# Patient Record
Sex: MALE | Race: WHITE | NOT HISPANIC OR LATINO | Employment: OTHER | URBAN - METROPOLITAN AREA
[De-identification: names, ages, dates, MRNs, and addresses within clinical notes are randomized per-mention and may not be internally consistent; named-entity substitution may affect disease eponyms.]

---

## 2020-05-08 ENCOUNTER — OFFICE VISIT (OUTPATIENT)
Dept: VASCULAR SURGERY | Facility: CLINIC | Age: 80
End: 2020-05-08
Payer: MEDICARE

## 2020-05-08 VITALS
TEMPERATURE: 97.4 F | HEIGHT: 64 IN | SYSTOLIC BLOOD PRESSURE: 130 MMHG | WEIGHT: 148 LBS | DIASTOLIC BLOOD PRESSURE: 76 MMHG | HEART RATE: 82 BPM | BODY MASS INDEX: 25.27 KG/M2

## 2020-05-08 DIAGNOSIS — I73.9 RIGHT LEG CLAUDICATION (HCC): Primary | ICD-10-CM

## 2020-05-08 DIAGNOSIS — I73.9 PAD (PERIPHERAL ARTERY DISEASE) (HCC): ICD-10-CM

## 2020-05-08 DIAGNOSIS — I65.23 ASYMPTOMATIC BILATERAL CAROTID ARTERY STENOSIS: ICD-10-CM

## 2020-05-08 PROBLEM — Z95.1 HX OF CABG: Status: ACTIVE | Noted: 2020-05-08

## 2020-05-08 PROBLEM — F32.A DEPRESSION: Status: ACTIVE | Noted: 2020-05-08

## 2020-05-08 PROCEDURE — 99204 OFFICE O/P NEW MOD 45 MIN: CPT | Performed by: NURSE PRACTITIONER

## 2020-05-08 RX ORDER — ASPIRIN 81 MG/1
325 TABLET ORAL DAILY
COMMUNITY
End: 2021-05-05 | Stop reason: ALTCHOICE

## 2020-05-11 ENCOUNTER — HOSPITAL ENCOUNTER (OUTPATIENT)
Dept: RADIOLOGY | Facility: HOSPITAL | Age: 80
Discharge: HOME | End: 2020-05-11
Payer: MEDICARE

## 2020-05-11 ENCOUNTER — TRANSCRIBE ORDERS (OUTPATIENT)
Dept: ADMINISTRATIVE | Facility: HOSPITAL | Age: 80
End: 2020-05-11

## 2020-05-11 DIAGNOSIS — I73.9 RIGHT LEG CLAUDICATION (HCC): ICD-10-CM

## 2020-05-11 DIAGNOSIS — I65.23 ASYMPTOMATIC BILATERAL CAROTID ARTERY STENOSIS: ICD-10-CM

## 2020-05-11 PROCEDURE — 93925 LOWER EXTREMITY STUDY: CPT | Performed by: SURGERY

## 2020-05-11 PROCEDURE — 93923 UPR/LXTR ART STDY 3+ LVLS: CPT

## 2020-05-11 PROCEDURE — 93925 LOWER EXTREMITY STUDY: CPT

## 2020-05-11 PROCEDURE — 93880 EXTRACRANIAL BILAT STUDY: CPT

## 2020-05-11 PROCEDURE — 93922 UPR/L XTREMITY ART 2 LEVELS: CPT | Performed by: SURGERY

## 2020-05-11 PROCEDURE — 93880 EXTRACRANIAL BILAT STUDY: CPT | Performed by: SURGERY

## 2020-05-13 ENCOUNTER — OFFICE VISIT (OUTPATIENT)
Dept: VASCULAR SURGERY | Facility: CLINIC | Age: 80
End: 2020-05-13
Payer: MEDICARE

## 2020-05-13 ENCOUNTER — TELEPHONE (OUTPATIENT)
Dept: VASCULAR SURGERY | Facility: CLINIC | Age: 80
End: 2020-05-13

## 2020-05-13 VITALS
BODY MASS INDEX: 25.1 KG/M2 | HEART RATE: 76 BPM | DIASTOLIC BLOOD PRESSURE: 80 MMHG | WEIGHT: 147 LBS | TEMPERATURE: 97.7 F | SYSTOLIC BLOOD PRESSURE: 142 MMHG | HEIGHT: 64 IN

## 2020-05-13 DIAGNOSIS — I73.9 PAD (PERIPHERAL ARTERY DISEASE) (HCC): ICD-10-CM

## 2020-05-13 DIAGNOSIS — I65.23 ASYMPTOMATIC BILATERAL CAROTID ARTERY STENOSIS: ICD-10-CM

## 2020-05-13 DIAGNOSIS — I73.9 RIGHT LEG CLAUDICATION (HCC): Primary | ICD-10-CM

## 2020-05-13 PROCEDURE — 99214 OFFICE O/P EST MOD 30 MIN: CPT | Performed by: SURGERY

## 2020-05-13 RX ORDER — UBIDECARENONE 50 MG
CAPSULE ORAL DAILY
COMMUNITY
End: 2021-06-18 | Stop reason: HOSPADM

## 2020-05-28 ENCOUNTER — TELEPHONE (OUTPATIENT)
Dept: VASCULAR SURGERY | Facility: CLINIC | Age: 80
End: 2020-05-28

## 2020-05-29 ENCOUNTER — PREP FOR PROCEDURE (OUTPATIENT)
Dept: VASCULAR SURGERY | Facility: CLINIC | Age: 80
End: 2020-05-29

## 2020-05-29 DIAGNOSIS — Z11.59 SCREENING FOR VIRAL DISEASE: Primary | ICD-10-CM

## 2020-05-29 DIAGNOSIS — I73.9 CLAUDICATION (HCC): Primary | ICD-10-CM

## 2020-06-18 PROCEDURE — 1124F ACP DISCUSS-NO DSCNMKR DOCD: CPT | Performed by: SURGERY

## 2020-06-19 ENCOUNTER — APPOINTMENT (OUTPATIENT)
Dept: LAB | Facility: CLINIC | Age: 80
End: 2020-06-19
Payer: MEDICARE

## 2020-06-19 DIAGNOSIS — I73.9 RIGHT LEG CLAUDICATION (HCC): ICD-10-CM

## 2020-06-19 LAB
ANION GAP SERPL CALCULATED.3IONS-SCNC: 4 MMOL/L (ref 4–13)
BUN SERPL-MCNC: 20 MG/DL (ref 5–25)
CALCIUM SERPL-MCNC: 8.9 MG/DL (ref 8.3–10.1)
CHLORIDE SERPL-SCNC: 107 MMOL/L (ref 100–108)
CO2 SERPL-SCNC: 30 MMOL/L (ref 21–32)
CREAT SERPL-MCNC: 0.79 MG/DL (ref 0.6–1.3)
ERYTHROCYTE [DISTWIDTH] IN BLOOD BY AUTOMATED COUNT: 14 % (ref 11.6–15.1)
GFR SERPL CREATININE-BSD FRML MDRD: 85 ML/MIN/1.73SQ M
GLUCOSE P FAST SERPL-MCNC: 103 MG/DL (ref 65–99)
HCT VFR BLD AUTO: 46.3 % (ref 36.5–49.3)
HGB BLD-MCNC: 14.8 G/DL (ref 12–17)
INR PPP: 0.96 (ref 0.84–1.19)
MCH RBC QN AUTO: 30.1 PG (ref 26.8–34.3)
MCHC RBC AUTO-ENTMCNC: 32 G/DL (ref 31.4–37.4)
MCV RBC AUTO: 94 FL (ref 82–98)
PLATELET # BLD AUTO: 176 THOUSANDS/UL (ref 149–390)
PMV BLD AUTO: 10.8 FL (ref 8.9–12.7)
POTASSIUM SERPL-SCNC: 4.2 MMOL/L (ref 3.5–5.3)
PROTHROMBIN TIME: 12.4 SECONDS (ref 11.6–14.5)
RBC # BLD AUTO: 4.91 MILLION/UL (ref 3.88–5.62)
SODIUM SERPL-SCNC: 141 MMOL/L (ref 136–145)
WBC # BLD AUTO: 4.17 THOUSAND/UL (ref 4.31–10.16)

## 2020-06-19 PROCEDURE — 36415 COLL VENOUS BLD VENIPUNCTURE: CPT

## 2020-06-19 PROCEDURE — 85610 PROTHROMBIN TIME: CPT

## 2020-06-19 PROCEDURE — 80048 BASIC METABOLIC PNL TOTAL CA: CPT

## 2020-06-19 PROCEDURE — 85027 COMPLETE CBC AUTOMATED: CPT

## 2020-06-25 ENCOUNTER — DOCUMENTATION (OUTPATIENT)
Dept: URGENT CARE | Facility: CLINIC | Age: 80
End: 2020-06-25

## 2020-06-25 DIAGNOSIS — Z11.59 SCREENING FOR VIRAL DISEASE: ICD-10-CM

## 2020-06-25 PROCEDURE — U0003 INFECTIOUS AGENT DETECTION BY NUCLEIC ACID (DNA OR RNA); SEVERE ACUTE RESPIRATORY SYNDROME CORONAVIRUS 2 (SARS-COV-2) (CORONAVIRUS DISEASE [COVID-19]), AMPLIFIED PROBE TECHNIQUE, MAKING USE OF HIGH THROUGHPUT TECHNOLOGIES AS DESCRIBED BY CMS-2020-01-R: HCPCS

## 2020-06-26 ENCOUNTER — ANESTHESIA EVENT (OUTPATIENT)
Dept: PERIOP | Facility: HOSPITAL | Age: 80
End: 2020-06-26
Payer: MEDICARE

## 2020-06-26 NOTE — PRE-PROCEDURE INSTRUCTIONS
Have you had / have a sore throat? no  have you had / have a cough less than 1 week? no  Have you had / have a fever greater than 100 0 - 100  4? no  Are you experiencing any shortness of breath? no    Pre-Surgery Instructions:   Medication Instructions    Apoaequorin (PREVAGEN PO) Instructed patient per Anesthesia Guidelines   aspirin (ECOTRIN LOW STRENGTH) 81 mg EC tablet Patient was instructed by Physician and understands  continue per MD note    FOLBIC 2 5-25-2 MG Instructed patient per Anesthesia Guidelines   niacin (NIASPAN) 1000 MG CR tablet Instructed patient per Anesthesia Guidelines   omega-3-acid ethyl esters (LOVAZA) 1 g capsule Instructed patient per Anesthesia Guidelines   PARoxetine (PAXIL) 10 mg tablet Instructed patient per Anesthesia Guidelines   Red Yeast Rice 600 MG TABS Instructed patient per Anesthesia Guidelines  Antidepressant Med Class     Continue to take this medication on your normal schedule  If this is an oral medication and you take it in the morning, then you may take this medicine with a sip of water  ASA Med Class: Aspirin     Should be discontinued at least one week prior to planned operation, unless specifically stated otherwise by surgical service  Your Surgeon may have patient stop taking aspirin up to a week before surgery if having intracranial, middle ear, posterior eye, spine surgery or prostate surgery  [Patients taking aspirin for coronary stents should be reviewed by an anesthesiologist in the optimization clinic  Please do not discontinue aspirin in patients with coronary stents unless given specific permission to do so by the cardiologist who prescribed medication ]   If your surgeon approves please continue to take this medication on your normal schedule  You may take this medication on the morning of your surgery with a sip of water      Herbal Med Class     Stop taking this herbal medications at least one week prior to surgery/procedure  NonStatin Med Class     Continue to take this medication on your normal schedule  If this is an oral medication and you take it in the morning, then you may take this medicine with a sip of water    Pre procedure instructions reviewed verbalizes understanding

## 2020-06-27 LAB — SARS-COV-2 RNA SPEC QL NAA+PROBE: NOT DETECTED

## 2020-07-02 ENCOUNTER — APPOINTMENT (OUTPATIENT)
Dept: RADIOLOGY | Facility: HOSPITAL | Age: 80
End: 2020-07-02
Attending: SURGERY
Payer: MEDICARE

## 2020-07-02 ENCOUNTER — ANESTHESIA (OUTPATIENT)
Dept: PERIOP | Facility: HOSPITAL | Age: 80
End: 2020-07-02
Payer: MEDICARE

## 2020-07-02 ENCOUNTER — HOSPITAL ENCOUNTER (OUTPATIENT)
Facility: HOSPITAL | Age: 80
Setting detail: OUTPATIENT SURGERY
Discharge: HOME | End: 2020-07-02
Attending: SURGERY | Admitting: SURGERY
Payer: MEDICARE

## 2020-07-02 VITALS
HEART RATE: 68 BPM | OXYGEN SATURATION: 97 % | RESPIRATION RATE: 18 BRPM | SYSTOLIC BLOOD PRESSURE: 138 MMHG | TEMPERATURE: 98 F | HEIGHT: 64 IN | BODY MASS INDEX: 25.1 KG/M2 | WEIGHT: 147 LBS | DIASTOLIC BLOOD PRESSURE: 72 MMHG

## 2020-07-02 DIAGNOSIS — I73.9 RIGHT LEG CLAUDICATION (HCC): Primary | ICD-10-CM

## 2020-07-02 DIAGNOSIS — I73.9 PERIPHERAL VASCULAR DISEASE, UNSPECIFIED (HCC): ICD-10-CM

## 2020-07-02 LAB
ATRIAL RATE: 0 BPM
ATRIAL RATE: 79 BPM
P AXIS: 30 DEGREES
PR INTERVAL: 158 MS
QRS AXIS: -12 DEGREES
QRS AXIS: 0 DEGREES
QRSD INTERVAL: 0 MS
QRSD INTERVAL: 128 MS
QT INTERVAL: 0 MS
QT INTERVAL: 430 MS
QTC INTERVAL: 0 MS
QTC INTERVAL: 493 MS
T WAVE AXIS: 0 DEGREES
T WAVE AXIS: 33 DEGREES
VENTRICULAR RATE: 0 BPM
VENTRICULAR RATE: 79 BPM

## 2020-07-02 PROCEDURE — C1876 STENT, NON-COA/NON-COV W/DEL: HCPCS | Performed by: SURGERY

## 2020-07-02 PROCEDURE — C1725 CATH, TRANSLUMIN NON-LASER: HCPCS | Performed by: SURGERY

## 2020-07-02 PROCEDURE — 76937 US GUIDE VASCULAR ACCESS: CPT | Performed by: SURGERY

## 2020-07-02 PROCEDURE — C1887 CATHETER, GUIDING: HCPCS | Performed by: SURGERY

## 2020-07-02 PROCEDURE — NC001 PR NO CHARGE: Performed by: SURGERY

## 2020-07-02 PROCEDURE — C1894 INTRO/SHEATH, NON-LASER: HCPCS | Performed by: SURGERY

## 2020-07-02 PROCEDURE — 93005 ELECTROCARDIOGRAM TRACING: CPT

## 2020-07-02 PROCEDURE — 93010 ELECTROCARDIOGRAM REPORT: CPT | Performed by: INTERNAL MEDICINE

## 2020-07-02 PROCEDURE — C1769 GUIDE WIRE: HCPCS | Performed by: SURGERY

## 2020-07-02 PROCEDURE — 76937 US GUIDE VASCULAR ACCESS: CPT

## 2020-07-02 PROCEDURE — 37226 PR REVASCULARIZE FEM/POP ARTERY,ANGIOPLASTY/STENT: CPT | Performed by: SURGERY

## 2020-07-02 PROCEDURE — 75625 CONTRAST EXAM ABDOMINL AORTA: CPT | Performed by: SURGERY

## 2020-07-02 PROCEDURE — 75710 ARTERY X-RAYS ARM/LEG: CPT

## 2020-07-02 PROCEDURE — C1760 CLOSURE DEV, VASC: HCPCS | Performed by: SURGERY

## 2020-07-02 PROCEDURE — 99024 POSTOP FOLLOW-UP VISIT: CPT | Performed by: SURGERY

## 2020-07-02 PROCEDURE — G9198 NO ORDER FOR CEPH NO REASON: HCPCS | Performed by: SURGERY

## 2020-07-02 PROCEDURE — 75710 ARTERY X-RAYS ARM/LEG: CPT | Performed by: SURGERY

## 2020-07-02 PROCEDURE — 75625 CONTRAST EXAM ABDOMINL AORTA: CPT

## 2020-07-02 DEVICE — SELF-EXPANDING STENT SYSTEM
Type: IMPLANTABLE DEVICE | Site: ARTERIAL | Status: FUNCTIONAL
Brand: INNOVA™ VASCULAR

## 2020-07-02 DEVICE — CLOSURE DEVICE MYNX ACE 6F/7FR: Type: IMPLANTABLE DEVICE | Site: ARTERIAL | Status: FUNCTIONAL

## 2020-07-02 RX ORDER — LIDOCAINE HYDROCHLORIDE 10 MG/ML
0.5 INJECTION, SOLUTION EPIDURAL; INFILTRATION; INTRACAUDAL; PERINEURAL ONCE AS NEEDED
Status: DISCONTINUED | OUTPATIENT
Start: 2020-07-02 | End: 2020-07-02 | Stop reason: HOSPADM

## 2020-07-02 RX ORDER — FENTANYL CITRATE/PF 50 MCG/ML
12.5 SYRINGE (ML) INJECTION
Status: DISCONTINUED | OUTPATIENT
Start: 2020-07-02 | End: 2020-07-02 | Stop reason: HOSPADM

## 2020-07-02 RX ORDER — SODIUM CHLORIDE, SODIUM LACTATE, POTASSIUM CHLORIDE, CALCIUM CHLORIDE 600; 310; 30; 20 MG/100ML; MG/100ML; MG/100ML; MG/100ML
50 INJECTION, SOLUTION INTRAVENOUS CONTINUOUS
Status: DISCONTINUED | OUTPATIENT
Start: 2020-07-02 | End: 2020-07-02 | Stop reason: HOSPADM

## 2020-07-02 RX ORDER — HYDROMORPHONE HCL/PF 1 MG/ML
0.5 SYRINGE (ML) INJECTION
Status: DISCONTINUED | OUTPATIENT
Start: 2020-07-02 | End: 2020-07-02 | Stop reason: HOSPADM

## 2020-07-02 RX ORDER — PROPOFOL 10 MG/ML
INJECTION, EMULSION INTRAVENOUS CONTINUOUS PRN
Status: DISCONTINUED | OUTPATIENT
Start: 2020-07-02 | End: 2020-07-02 | Stop reason: SURG

## 2020-07-02 RX ORDER — HEPARIN SODIUM 1000 [USP'U]/ML
INJECTION, SOLUTION INTRAVENOUS; SUBCUTANEOUS AS NEEDED
Status: DISCONTINUED | OUTPATIENT
Start: 2020-07-02 | End: 2020-07-02 | Stop reason: SURG

## 2020-07-02 RX ORDER — ONDANSETRON 2 MG/ML
4 INJECTION INTRAMUSCULAR; INTRAVENOUS ONCE AS NEEDED
Status: DISCONTINUED | OUTPATIENT
Start: 2020-07-02 | End: 2020-07-02 | Stop reason: HOSPADM

## 2020-07-02 RX ORDER — CLOPIDOGREL BISULFATE 75 MG/1
75 TABLET ORAL DAILY
Qty: 90 TABLET | Refills: 0 | Status: SHIPPED | OUTPATIENT
Start: 2020-07-02 | End: 2020-11-18 | Stop reason: ALTCHOICE

## 2020-07-02 RX ORDER — SODIUM CHLORIDE 9 MG/ML
INJECTION, SOLUTION INTRAVENOUS CONTINUOUS PRN
Status: DISCONTINUED | OUTPATIENT
Start: 2020-07-02 | End: 2020-07-02 | Stop reason: SURG

## 2020-07-02 RX ORDER — OXYCODONE HYDROCHLORIDE 5 MG/1
10 TABLET ORAL EVERY 4 HOURS PRN
Status: DISCONTINUED | OUTPATIENT
Start: 2020-07-02 | End: 2020-07-02 | Stop reason: HOSPADM

## 2020-07-02 RX ORDER — LIDOCAINE HYDROCHLORIDE 10 MG/ML
INJECTION, SOLUTION EPIDURAL; INFILTRATION; INTRACAUDAL; PERINEURAL AS NEEDED
Status: DISCONTINUED | OUTPATIENT
Start: 2020-07-02 | End: 2020-07-02 | Stop reason: HOSPADM

## 2020-07-02 RX ORDER — OXYCODONE HYDROCHLORIDE 5 MG/1
5 TABLET ORAL EVERY 4 HOURS PRN
Status: DISCONTINUED | OUTPATIENT
Start: 2020-07-02 | End: 2020-07-02 | Stop reason: HOSPADM

## 2020-07-02 RX ORDER — PROTAMINE SULFATE 10 MG/ML
INJECTION, SOLUTION INTRAVENOUS AS NEEDED
Status: DISCONTINUED | OUTPATIENT
Start: 2020-07-02 | End: 2020-07-02 | Stop reason: SURG

## 2020-07-02 RX ORDER — HEPARIN SODIUM 200 [USP'U]/100ML
INJECTION, SOLUTION INTRAVENOUS
Status: COMPLETED | OUTPATIENT
Start: 2020-07-02 | End: 2020-07-02

## 2020-07-02 RX ORDER — CLOPIDOGREL BISULFATE 75 MG/1
150 TABLET ORAL DAILY
Status: DISCONTINUED | OUTPATIENT
Start: 2020-07-02 | End: 2020-07-02 | Stop reason: HOSPADM

## 2020-07-02 RX ADMIN — PROPOFOL 20 MCG/KG/MIN: 10 INJECTION, EMULSION INTRAVENOUS at 10:47

## 2020-07-02 RX ADMIN — HEPARIN SODIUM 3000 UNITS: 1000 INJECTION INTRAVENOUS; SUBCUTANEOUS at 11:17

## 2020-07-02 RX ADMIN — DEXMEDETOMIDINE 0.4 MCG/KG/HR: 100 INJECTION, SOLUTION, CONCENTRATE INTRAVENOUS at 10:47

## 2020-07-02 RX ADMIN — HEPARIN SODIUM 3000 UNITS: 1000 INJECTION INTRAVENOUS; SUBCUTANEOUS at 11:28

## 2020-07-02 RX ADMIN — CLOPIDOGREL BISULFATE 150 MG: 75 TABLET ORAL at 13:16

## 2020-07-02 RX ADMIN — PROTAMINE SULFATE 15 MG: 10 INJECTION, SOLUTION INTRAVENOUS at 12:15

## 2020-07-02 RX ADMIN — SODIUM CHLORIDE, SODIUM LACTATE, POTASSIUM CHLORIDE, AND CALCIUM CHLORIDE 50 ML/HR: .6; .31; .03; .02 INJECTION, SOLUTION INTRAVENOUS at 10:01

## 2020-07-02 RX ADMIN — SODIUM CHLORIDE: 0.9 INJECTION, SOLUTION INTRAVENOUS at 10:47

## 2020-07-02 NOTE — ANESTHESIA POSTPROCEDURE EVALUATION
Post-Op Assessment Note    CV Status:  Stable  Pain Score: 0    Pain management: adequate     Mental Status:  Alert and awake   Hydration Status:  Euvolemic   PONV Controlled:  Controlled   Airway Patency:  Patent   Post Op Vitals Reviewed: Yes      Staff: CRNA, Anesthesiologist           /82 (07/02/20 1233)    Temp (!) 96 5 °F (35 8 °C) (07/02/20 1233)    Pulse 70 (07/02/20 1233)   Resp 20 (07/02/20 1233)    SpO2 100 % (07/02/20 1233)

## 2020-07-02 NOTE — DISCHARGE INSTRUCTIONS
DISCHARGE INSTRUCTIONS  ARTERIOGRAM/ANGIOPLASTY/STENT    Following discharge from the hospital, you may have some questions about your procedure, your activities or your general condition  These instructions may answer some of your questions and help you adjust during the first few weeks following your operation  ACTIVITY: The evening following the procedure you should be sure someone remains with you until the next morning  Rest as much as possible, sitting, lying or reclining  Use the stairs as little as possible  The following day, limit your activity to walking  Avoid stooping or heavy lifting (no more than 30 lbs) for the first three days  Walking up steps and normal activities may be resumed as you feel ready  You should not drive a car for at least two days following discharge from the hospital  You may ride in a car  If you have any questions regarding a particular activity, please discuss with your doctor or nurse before you are discharged  DIET:  Resume your normal diet  Try to eat low fat and low cholesterol foods  Drink more liquids than usual for the next 24 hours  INCISION: Your doctor may have chosen to use a type of adhesive glue, to close your incision  The glue is used to cover the incision, assist in closure, and prevent contamination  This adhesive will darken and peel away on its own within one to two weeks  You may shower after the procedure, but do not scrub the incision  Sitting in a tub is not recommended for the two days following the procedure or if you have any open wounds  It is normal to have some bruising, swelling or mild discoloration around the incision  IF increasing redness or pain develops, call our office immediately  If present, you may remove the band-aid or steri-strips over your wound after two days  If you notice any active bleeding at the site, apply pressure to the site and call our office ([phone])      FOLLOW UP STUDIES:  Your doctor will discuss whether further treatments or follow-up studies are necessary at your first post procedure visit  PLEASE CALL THE OFFICE IF YOU HAVE ANY QUESTIONS  [phone] Natalya Hartley [facility] FREE [phone]  [address] [place] [address] , [address], [facility], [address]  [address], [address], [facility], [address] [facility]  [address], [facility], [address]  [address] [facility], [facility],[address], [facility], [address]    [address], [address]th [address], [address]i [address]  [address] [facility], [facility] 97   [address] [facility], [address] [facility], [facility] [place], [address] [facility]  [address] [facility], [address] [facility], Stacey Whitlock 6

## 2020-07-02 NOTE — ANESTHESIA PREPROCEDURE EVALUATION
Review of Systems/Medical History  Patient summary reviewed  Chart reviewed  No history of anesthetic complications     Cardiovascular  Exercise tolerance (METS): >4,  Hyperlipidemia, History of CABG (1995), Cardiac stents > 1 year PVD (H/o R SFA stent 2011),    Pulmonary  Smoker (Remote smoking hx) ex-smoker  ,        GI/Hepatic  Negative GI/hepatic ROS          Negative  ROS        Endo/Other  Negative endo/other ROS      GYN       Hematology  Negative hematology ROS      Musculoskeletal  Negative musculoskeletal ROS        Neurology   Psychology   Depression ,            Lab Results   Component Value Date    WBC 4 17 (L) 06/19/2020    HGB 14 8 06/19/2020    HCT 46 3 06/19/2020    MCV 94 06/19/2020     06/19/2020     Lab Results   Component Value Date    K 4 2 06/19/2020    CO2 30 06/19/2020     06/19/2020    BUN 20 06/19/2020    CREATININE 0 79 06/19/2020     Lab Results   Component Value Date    INR 0 96 06/19/2020    PROTIME 12 4 06/19/2020       Physical Exam    Airway    Mallampati score: II  TM Distance: >3 FB  Neck ROM: full     Dental       Cardiovascular      Pulmonary      Other Findings        Anesthesia Plan  ASA Score- 3     Anesthesia Type- IV sedation with anesthesia with ASA Monitors  Additional Monitors:   Airway Plan:         Plan Factors-    Induction- intravenous  Postoperative Plan-     Informed Consent- Anesthetic plan and risks discussed with patient  I personally reviewed this patient with the CRNA  Discussed and agreed on the Anesthesia Plan with the FATUMA Mayberry

## 2020-07-02 NOTE — H&P
Right leg claudication [facility])  Prior history of right popliteal artery stent done by Dr Huong Orosco at [facility]  Stent has high-grade stenosis  Also has stenosis of the common femoral artery as seen on the recent Dopplers  JIA is well preserved at 0 8 due to robust collaterals and chronicity of the process  Due to [place] pandemic patient would like to wait on any intervention  We had a discussion of the risks benefits and alternatives of endovascular intervention versus continued medical management  Due to the severe lifestyle limitation he would like to consider endovascular intervention  As he has no rest pain or ischemic tissue loss it is reasonable to wait as per his request and consider this after the [place] pandemic situation has improved  We had a discussion of the risks and benefits of the procedure such as access site complications from bleeding, distal embolization, stent reocclusion                       Subjective:       Patient ID: Elijah Stevens is a 79 y o  male      Patient presents today for right leg angiogram with intervention      The following portions of the patient's history were reviewed and updated as appropriate: allergies, current medications, past family history, past medical history, past social history, past surgical history and problem list      Review of Systems   Constitutional: Negative  HENT: Positive for hearing loss  Eyes: Negative  Respiratory: Negative  Cardiovascular: Negative  Gastrointestinal: Negative  Endocrine: Negative  Genitourinary: Negative  Musculoskeletal: Positive for gait problem  Leg pain  Leg cramping with walking   Skin: Negative  Allergic/Immunologic: Negative  Hematological: Negative  Psychiatric/Behavioral: Positive for confusion       I have reviewed the review of systems as entered and made appropriate changes as necessary     Objective:        /84   Pulse 79   Temp (!) 95 4 °F (35 2 °C) (Tympanic) Resp 18   Ht 5' 4" (1 626 m)   Wt 66 7 kg (147 lb)   SpO2 99%   BMI 25 23 kg/m²            Physical Exam   Constitutional: He is oriented to person, place, and time  He appears well-developed and well-nourished  HENT:   Head: Normocephalic and atraumatic  Right Ear: External ear normal    Left Ear: External ear normal    Cardiovascular: Normal rate  normal heart sounds  Left foot has triphasic DP and PT signals  Right foot has biphasic DP and PT signals  Pulmonary/Chest: Effort normal  Clear to auscultation bilateral   Abdominal: Soft  Neurological: He is alert and oriented to person, place, and time  Skin: Skin is warm and dry  Capillary refill takes less than 2 seconds  No rash noted  No erythema  No pallor  Psychiatric: He has a normal mood and affect  His behavior is normal    Nursing note and vitals reviewed

## 2020-07-02 NOTE — OP NOTE
OPERATIVE REPORT  PATIENT NAME: Miquel Harris    :  1940  MRN: [id number]  Pt Location: [facility] HYBRID OR ROOM 02    SURGERY DATE: 2020    Surgeon(s) and Role:     * Candy Pena MD - Primary     * Leno Hadley MD - Assisting    Preop Diagnosis:  Right leg claudication ([address] ) [I73 9]    Post-Op Diagnosis Codes:     * Right leg claudication ([address] ) [I73 9]    Procedure(s) (LRB):  US guided access of the left common femoral artery  Aortogram with pelvic runoff  Right leg angiogram   Right SFA and pop angioplasty and stent    Specimen(s):  * No specimens in log *    Estimated Blood Loss:   100 mL    Drains:  * No LDAs found *    Anesthesia Type:   Choice    Operative Indications:  Right leg claudication ([address] ) [I73 9]  Prior h/o right leg SFA stent done by Dr Charisma Sadler at [facility] 10 years ago    Operative Findings:  Angiogram findings and radiographic interpretation of test -   1  Abdominal aortogram findings -   Abdominal aorta - patent  Celiac axis - patent  Left renal artery - patent  Right renal artery - patent  Superior mesenteric artery - patent  This is a limited view aortogram    2  Pelvic runoff findings -   Left common iliac artery-patent  Left internal iliac artery-patent  Left external iliac artery-patent    Right common iliac artery-patent  Right internal iliac artery-patent  Right external iliac artery-patent     Right LE -   Common femoral heavily calcified with popcorn calcifications with about 50% stenosis  Profunda is widely patent with robust collaterals reconsituting the distal SFA  SFA is patent including prior stent in the distal SFA  Separate stent in the above knee popliteal is also patent  Inbetween the stents is a 15 cm portion  There are two separate near occlusive calcified stenosis spots  The rest of the popliteal artery is also patent  There is a two vessel runoff with patent TPT, PT and peroneal   AT occludes shortly after its takeoff    DP reconstitutes from peroneal and PT  Pedal arch is complete  Post intervention there is complete resolution of occlusive stenosis in the right distal SFA and popliteal artery  Complications:   None    Procedure and Technique:  IOPERATIVE PROCEDURE:  After patient identification and informed consent, the patient was taken to the Hybrid room in the interventional radiology suite and placed in a supine position  Adequate sedation was administered via IV route  The patients bilateral groins were cleaned and draped in sterile surgical fashion using Chlorhexidine  1% local Lidocaine was injected into the skin and subcutaneous tissue overlying the right femoral pulsation  Under ultrasound guidance a micro access needle was used to access the left common femoral artery  The common femoral artery was patent  There were posterior calcifications  After obtaining pulsatile flow, a micro guidewire was inserted through the needle and the access site was enlarged with a #11 scalpel  The needle was removed and a 4 Western Madonna micro access sheath was inserted over the guidewire using Seldinger technique  The micro wire was removed and a Bentson wire was advanced under fluoroscopic guidance through the micro sheath and parked in the proximal abdominal aorta under fluoroscopic guidance  The micro sheath was removed and a then a  5 Nepali sheath was inserted again using Seldinger technique over the wire  An Omni Flush catheter was next advanced over the guidewire and fluoroscopic guidance was used to park the catheter in the proximal abdominal aorta  Patient was then given 6000 units of IV heparin  Aortogram was performed  Using a Bentson and Omni flush catheter we went up and over and selected the right external iliac artery  right lower extremity runoff was performed  Over storq wire a 6Fr x 65 cm sheath was placed in the right distal SFA    Then using roadmap guide, 0 018 glide advantage and Navicross was used to cross lesions in true lumen   True lumen entry was confirmed with aspiration and angiography  Then we did predilatation with 4x100 Mercedes for 180s  Then we placed a [id number] stent across the area due to heavily calcified plaque  Stent was post dilated with a 4x100 and 6x100 balloon with excellent expansion  Completion angiogram was excellent  We then successfully deployed the Mynx as per 's instructions to achieve hemostasis at the puncture site  Patient was then given 15 mg of protamine to reverse the effect of heparin  At the end of the case patient's bilateral feet were well perfused and had good cap refill  There is now a new 2+ right PT pulse  On left there is unchanged 2+ DP pulse          Contrast Type/Amount -  81 CC VISIPAQUE 320    Fluoro Time (Mandatory) -15 3 MIN    Devices - MYNX , mercedes 4x100,  6x100 and Innova 6x100 stent    PLAN:    Start plavix   4 hrs bed rest      I was present for the entire procedure    Patient Disposition:  PACU     SIGNATURE: Braydon Ceja MD  DATE: July 2, 2020  TIME: 12:19 PM

## 2020-07-02 NOTE — NURSING NOTE
Family requested that patient script  Be transferred to another pharmacy spoke with new pharmacy deonna Rajan) will call [facility] in [place] to have script transferred  (plavix)

## 2020-07-14 ENCOUNTER — TELEPHONE (OUTPATIENT)
Dept: VASCULAR SURGERY | Facility: CLINIC | Age: 80
End: 2020-07-14

## 2020-07-14 NOTE — TELEPHONE ENCOUNTER

## 2020-07-15 ENCOUNTER — OFFICE VISIT (OUTPATIENT)
Dept: VASCULAR SURGERY | Facility: CLINIC | Age: 80
End: 2020-07-15
Payer: MEDICARE

## 2020-07-15 VITALS
HEIGHT: 64 IN | SYSTOLIC BLOOD PRESSURE: 136 MMHG | BODY MASS INDEX: 25.1 KG/M2 | WEIGHT: 147 LBS | DIASTOLIC BLOOD PRESSURE: 80 MMHG | TEMPERATURE: 97 F | HEART RATE: 92 BPM

## 2020-07-15 DIAGNOSIS — I73.9 RIGHT LEG CLAUDICATION (HCC): ICD-10-CM

## 2020-07-15 DIAGNOSIS — I73.9 PAD (PERIPHERAL ARTERY DISEASE) (HCC): Primary | ICD-10-CM

## 2020-07-15 PROCEDURE — 99213 OFFICE O/P EST LOW 20 MIN: CPT | Performed by: SURGERY

## 2020-07-15 NOTE — PROGRESS NOTES
Assessment/Plan:    Right leg claudication (HCC)  Recently underwent right lower extremity angiogram with balloon angioplasty and stenting of the right SFA with excellent results  Claudication has totally resolved and is back to routine activity  Follow-up arterial Doppler in 3 months to assess for patency of intervention  Continue Plavix  Diagnoses and all orders for this visit:    PAD (peripheral artery disease) (formerly [facility])  -     VAS lower limb arterial duplex, limited/unilateral; Future    Right leg claudication (formerly [facility])          Subjective:      Patient ID: Mina Dunn is a 79 y o  male  Patient is s/p RLE agram w/ intervention done 7/2 and presents today for f/u visit  HPI  Patient recently underwent right lower extremity angiogram with balloon angioplasty and stenting done on July 2nd  This was done because he was having pain when walking short distances in the right calf with significant cramping  Since the procedure this is totally resolved  He can walk for unlimited distance without any issues  He even get go uphill and down healed without any issues  He has some bruising in the left groin but that has now nearly resolved  It is tracking down a little bit on the thigh but it is now off a light brown color  The following portions of the patient's history were reviewed and updated as appropriate: allergies, current medications, past family history, past medical history, past social history, past surgical history and problem list     Review of Systems   Constitutional: Negative  HENT: Negative  Eyes: Negative  Respiratory: Negative  Cardiovascular: Negative  Gastrointestinal: Negative  Endocrine: Negative  Genitourinary: Negative  Musculoskeletal: Negative  Skin: Negative  Allergic/Immunologic: Negative  Neurological: Negative  Hematological: Negative  Psychiatric/Behavioral: Negative        I have reviewed the review of systems as entered and made appropriate changes as necessary    Objective:      /80 (BP Location: Right arm, Patient Position: Sitting)   Pulse 92   Temp (!) 97 °F (36 1 °C) (Tympanic)   Ht 5' 4" (1 626 m)   Wt 66 7 kg (147 lb)   BMI 25 23 kg/m²          Physical Exam   Constitutional: He is oriented to person, place, and time  He appears well-developed and well-nourished  HENT:   Head: Normocephalic and atraumatic  Right Ear: External ear normal    Left Ear: External ear normal    Eyes: Conjunctivae are normal  Right eye exhibits no discharge  Left eye exhibits no discharge  Cardiovascular: Normal rate and regular rhythm  Exam reveals no gallop and no friction rub  No murmur heard  Palpable 2+ right pulse  Anterior tibial pulse   Pulmonary/Chest: Effort normal and breath sounds normal  No stridor  No respiratory distress  He has no wheezes  Abdominal: Soft  Musculoskeletal: Normal range of motion  He exhibits no edema, tenderness or deformity  Neurological: He is alert and oriented to person, place, and time  Skin: Skin is warm and dry  Capillary refill takes less than 2 seconds  No rash noted  No erythema  No pallor  Psychiatric: He has a normal mood and affect  His behavior is normal    Nursing note and vitals reviewed

## 2020-07-15 NOTE — ASSESSMENT & PLAN NOTE
Recently underwent right lower extremity angiogram with balloon angioplasty and stenting of the right SFA with excellent results  Claudication has totally resolved and is back to routine activity  Follow-up arterial Doppler in 3 months to assess for patency of intervention  Continue Plavix

## 2020-10-15 ENCOUNTER — HOSPITAL ENCOUNTER (OUTPATIENT)
Dept: NON INVASIVE DIAGNOSTICS | Facility: CLINIC | Age: 80
Discharge: HOME | End: 2020-10-15
Payer: MEDICARE

## 2020-10-15 DIAGNOSIS — I73.9 PAD (PERIPHERAL ARTERY DISEASE) (HCC): ICD-10-CM

## 2020-10-15 PROCEDURE — 93923 UPR/LXTR ART STDY 3+ LVLS: CPT

## 2020-10-15 PROCEDURE — 93926 LOWER EXTREMITY STUDY: CPT

## 2020-10-16 PROCEDURE — 93922 UPR/L XTREMITY ART 2 LEVELS: CPT | Performed by: SURGERY

## 2020-10-16 PROCEDURE — 93926 LOWER EXTREMITY STUDY: CPT | Performed by: SURGERY

## 2020-11-17 ENCOUNTER — TELEPHONE (OUTPATIENT)
Dept: VASCULAR SURGERY | Facility: CLINIC | Age: 80
End: 2020-11-17

## 2020-11-18 ENCOUNTER — OFFICE VISIT (OUTPATIENT)
Dept: VASCULAR SURGERY | Facility: CLINIC | Age: 80
End: 2020-11-18
Payer: MEDICARE

## 2020-11-18 VITALS
BODY MASS INDEX: 25.27 KG/M2 | WEIGHT: 148 LBS | SYSTOLIC BLOOD PRESSURE: 148 MMHG | DIASTOLIC BLOOD PRESSURE: 82 MMHG | RESPIRATION RATE: 18 BRPM | TEMPERATURE: 97.7 F | HEIGHT: 64 IN | HEART RATE: 76 BPM

## 2020-11-18 DIAGNOSIS — I65.23 ASYMPTOMATIC BILATERAL CAROTID ARTERY STENOSIS: ICD-10-CM

## 2020-11-18 DIAGNOSIS — I73.9 PAD (PERIPHERAL ARTERY DISEASE) (HCC): ICD-10-CM

## 2020-11-18 DIAGNOSIS — I73.9 RIGHT LEG CLAUDICATION (HCC): Primary | ICD-10-CM

## 2020-11-18 PROCEDURE — 99212 OFFICE O/P EST SF 10 MIN: CPT | Performed by: SURGERY

## 2021-01-08 ENCOUNTER — TELEPHONE (OUTPATIENT)
Dept: GERIATRICS | Age: 81
End: 2021-01-08

## 2021-01-08 NOTE — TELEPHONE ENCOUNTER
[place] [address]  [phone]  Telephone Intake: Geriatric Assessment     Referral source: PCP- Dr Stevie Johnson                           Patient's PCP: Dr Stevie Johnson          Who called to schedule the appointment? Tonny Raymond (daughter)      (relationship to patient): same as above   's phone number: [phone]              Is there a POA in place/If so, who has POA? Yes   Others residing with patient: alone     Reason for referral: Patient concerns  regarding memory concerns  Patient has two properties one in [place] and one in [place], family is concerned because he is always driving back and forth between both properties  What is the goal of visit? family would like patient to be tested to determine if he has dementia      Has the patient been seen by a Neurologist? No  Has the patient ever been formally tested for memory/dementia? No  Has the patient ever been diagnosed with dementia? No      Preferred language? english   Can patient read English? Yes   Highest education level? Highest Level of Education: Associate's Degree   Does the patient wear glasses? No  Does the patient use hearing aids? No     First Visit: 2/5/21 at   Conference Visit: 2/26/21 at [address] [place] [address]  In office visit and family conference virtual/hybrid visit options explained? No    Caller was informed: Please make sure the patient is accompanied by someone who knows them well / a caregiver / family member to participate in this appointment  If a patient plans to attend the assessment alone, please route a message to the assigned provider  Office packet mailed out?  Yes

## 2021-01-26 ENCOUNTER — TELEPHONE (OUTPATIENT)
Dept: GERIATRICS | Age: 81
End: 2021-01-26

## 2021-02-04 ENCOUNTER — TELEPHONE (OUTPATIENT)
Dept: GERIATRICS | Age: 81
End: 2021-02-04

## 2021-02-04 NOTE — TELEPHONE ENCOUNTER
Left voicemail message for Terrie Black to contact Kathy Mendez  Will provide available dates/times for multiple geriatricians and attempt to schedule a COVID vaccine appointment

## 2021-02-04 NOTE — TELEPHONE ENCOUNTER
LCSW spoke with Mathew De Oliveira regarding tomorrow's appointment, noting that our provider will not be able to be present in the office but will plan to participate virtually  I will complete screenings as usual  Mathew De Oliveira noted that she would prefer to see a provider in person  Mathew De Oliveira also noted having spoken with someone in our office yesterday Diane Pride, ) regarding arranging her MyChart access to schedule her father's vaccine  She requests that if possible, he be called by phone to schedule rather than using MyChart  Mathew De Oliveira would like to know  1) When will Dr Jackelyn Chavez be returning to the office for in-person appointments? 2) What availability do other providers have to see her father for an assessment? She would not like to reschedule the appointment until she has more information about rescheduling options  Mathew De Oliveira can be reached at her work number today, [phone], rather than her cell phone

## 2021-02-05 ENCOUNTER — OFFICE VISIT (OUTPATIENT)
Dept: GERIATRICS | Age: 81
End: 2021-02-05
Payer: MEDICARE

## 2021-02-05 VITALS
DIASTOLIC BLOOD PRESSURE: 72 MMHG | TEMPERATURE: 98.1 F | WEIGHT: 152.6 LBS | BODY MASS INDEX: 26.05 KG/M2 | OXYGEN SATURATION: 98 % | SYSTOLIC BLOOD PRESSURE: 162 MMHG | HEART RATE: 75 BPM | HEIGHT: 64 IN | RESPIRATION RATE: 18 BRPM

## 2021-02-05 DIAGNOSIS — I73.9 PAD (PERIPHERAL ARTERY DISEASE) (HCC): ICD-10-CM

## 2021-02-05 DIAGNOSIS — Z95.1 HX OF CABG: ICD-10-CM

## 2021-02-05 DIAGNOSIS — F03.90 DEMENTIA WITHOUT BEHAVIORAL DISTURBANCE, UNSPECIFIED DEMENTIA TYPE (HCC): Primary | ICD-10-CM

## 2021-02-05 DIAGNOSIS — R03.0 ELEVATED BLOOD PRESSURE READING: ICD-10-CM

## 2021-02-05 DIAGNOSIS — F33.0 MILD EPISODE OF RECURRENT MAJOR DEPRESSIVE DISORDER (HCC): ICD-10-CM

## 2021-02-05 DIAGNOSIS — R89.9 ABNORMAL LABORATORY TEST: ICD-10-CM

## 2021-02-05 DIAGNOSIS — R79.9 ABNORMAL FINDING OF BLOOD CHEMISTRY, UNSPECIFIED: ICD-10-CM

## 2021-02-05 DIAGNOSIS — K59.09 OTHER CONSTIPATION: ICD-10-CM

## 2021-02-05 PROCEDURE — 99205 OFFICE O/P NEW HI 60 MIN: CPT | Performed by: STUDENT IN AN ORGANIZED HEALTH CARE EDUCATION/TRAINING PROGRAM

## 2021-02-05 NOTE — PROGRESS NOTES
Assessment & Plan:   There are no diagnoses linked to this encounter  No problem-specific Assessment & Plan notes found for this encounter  HPI:  We had the pleasure of evaluating Dianne Goldmann who is a 79 y o  male   in Geriatric consultation today  He lives with ***  Mr Kai Cooper is in the office with his ***    Patient seen with Dr Anel Nguyen and Bruce Bullard  MSW    Memory Issues noticed since {0 - 10:25131} {Time; day/wk/mo/yr(s):9074}    Memory affected:      {ED  CD_MEMORY:55199}    Symptoms started: {DESC; JRQLP:79648}  Over time the memory has:  {progression:7576887655}  Memory issue(s) were noted by: {Patient/Family/Caregiver:418866}   Patient has difficulties with {memory prob:52997}    He has problems operating household appliance such as TV remote, kitchen appliances, computer       He has difficulty finding the right word while speaking: {Yes or No:74411}  Patient requires repeat information or ask the same question repeatedly: {Yes or No:19152}  Do you drive: {Yes or ANDREW:77452}       Have you had any recent accidents, citations or getting lost in familiar places :{Yes or No:34706}  Do you handle your own financial affairs such as balancing your checkbook, paying bills, investments: {Yes or No:56452}  Do have any difficulties with handling your financial affairs: {Yes or No:37166}  Have you or your family noted any change in your mood or personality:{Yes or No:11472}  Are you currently or have you been treated in the past for depression or anxiety: {Yes or No:39746}  Have you noticed any gait or balance disorder: {Yes or No:21684}  Uses :{SL IP Geriatric Assistive Devices:78343}  Any hallucination or delusion: {Yes or No:15025}  Fluctuation in alertness: {Yes or No:83140}  Sleep Issues: {Yes or No:61362}  Urinary/Stool Incontinence: {Yes or No:22067}  Hearing and vision issue: {Yes or No:06984}  Do you have POA:{Yes or No:00426}  Do you have a Living will {Yes or No:18530}  Past Medical, surgical, social, medication and allergy history and patients previous records reviewed  Family Review of Behavior [facility]:    pacing  {Yes or No:15074}    agressive/combative behavior  {Yes or No:89452}    agitated  {Yes or No:01576}   wandering  {Yes or No:97334}   resistance to care  {Yes or No:33449}   hoarding/hiding objects  {Yes or No:58627}    suspicious  {Yes or No:60739}  withdrawn {Yes or No:40634}{Yes or No:58228}  inappropriate sexual behavior{Yes or No:33333}  rummaging/pillaging  {Yes or No:82582}    misplacing/losing objects {Yes or No:99036}  personal hygiene problems  {Yes or No:70563}  forgetfulness of actions {Yes or No:99241}   temper outbursts  {Yes or No:83258}     throwing items {Yes or No:09934}      Family member with dementia and what type? {YES/NO:29480}  Have you had any head trauma {Yes or No:61712}  Does patient have history of alcohol abuse {Yes or No:18778}      ROS: Review of Systems    Allergies:   No Known Allergies    Medications:      Current Outpatient Medications:     Apoaequorin (PREVAGEN PO), Take by mouth, Disp: , Rfl:     aspirin (ECOTRIN LOW STRENGTH) 81 mg EC tablet, Take 81 mg by mouth daily, Disp: , Rfl:     FOLBIC 2 5-25-2 MG, Take 1 tablet by mouth daily, Disp: , Rfl: 4    niacin (NIASPAN) 1000 MG CR tablet, daily , Disp: , Rfl: 3    omega-3-acid ethyl esters (LOVAZA) 1 g capsule, Take 2 g by mouth 2 (two) times a day, Disp: , Rfl:     PARoxetine (PAXIL) 10 mg tablet, Take 10 mg by mouth daily, Disp: , Rfl: 0    Red Yeast Rice 600 MG TABS, Take by mouth daily , Disp: , Rfl:     Vitals: There were no vitals filed for this visit      History:  Past Medical History:   Diagnosis Date    [facility]     Claudication in peripheral vascular disease [facility] )     High cholesterol      Past Surgical History:   Procedure Laterality Date    BYPASS GRAFT  1996    CAROTID STENT  2012    COLONOSCOPY      CORONARY ARTERY BYPASS GRAFT  1995    IR AORTAGRAM WITH RUN-OFF  2020    [place] SLCTV CATHJ 3RD+ ORD SLCTV ABDL PEL/LXTR [facility] Right 2020    Procedure: ARTERIOGRAM, RIGHT LEG ANGIOGRAM, BALLOON ANGIOPLASTY AND STENTING OF RIGHT SFA;  Surgeon: Candace Damon MD;  [facility];  Service: Vascular    VASCULAR SURGERY       Family History   Problem Relation Age of Onset    Hypertension Family     Heart disease Family     Stroke Family     No Known Problems Mother     No Known Problems Father      Social History     Socioeconomic History    Marital status:       Spouse name: Not on file    Number of children: 3    Years of education: Not on file    Highest education level: Not on file   Occupational History    Not on file   Social Needs    Financial resource strain: Not on file    Food insecurity     Worry: Not on file     Inability: Not on file    Transportation needs     Medical: Not on file     Non-medical: Not on file   Tobacco Use    Smoking status: Former Smoker     Types: Cigarettes     Quit date: 1964     Years since quittin 4    Smokeless tobacco: Never Used   Substance and Sexual Activity    Alcohol use: Yes     Frequency: 2-3 times a week     Drinks per session: 1 or 2     Comment: social     Drug use: Never    Sexual activity: Not Currently   Lifestyle    Physical activity     Days per week: Not on file     Minutes per session: Not on file    Stress: Not on file   Relationships    Social connections     Talks on phone: Not on file     Gets together: Not on file     Attends Latter day service: Not on file     Active member of club or organization: Not on file     Attends meetings of clubs or organizations: Not on file     Relationship status: Not on file    Intimate partner violence     Fear of current or ex partner: Not on file     Emotionally abused: Not on file     Physically abused: Not on file     Forced sexual activity: Not on file   Other Topics Concern    Not on file   Social History Narrative  Not on file     Past Surgical History:   Procedure Laterality Date    BYPASS GRAFT  1996    CAROTID STENT  2012    COLONOSCOPY      CORONARY ARTERY BYPASS GRAFT  1995    IR AORTAGRAM WITH RUN-OFF  7/2/2020    [place] [address] 3RD+ ORD SLCTV ABDL PEL/LXTR [facility] Right 7/2/2020    Procedure: ARTERIOGRAM, RIGHT LEG ANGIOGRAM, BALLOON ANGIOPLASTY AND STENTING OF RIGHT SFA;  Surgeon: Miguel Ángel Vo MD;  [facility];  Service: Vascular    VASCULAR SURGERY           Physical Exam:   Physical Exam

## 2021-02-05 NOTE — PROGRESS NOTES
Virtual Regular Visit      Assessment/Plan:    Problem List Items Addressed This Visit        Cardiovascular and Mediastinum    PAD (peripheral artery disease) ([facility] )      History of right leg claudication and asymptomatic bilateral carotid artery stenosis   Continues to follow with vascular surgery   S/p Right lower extremity angiogram with  balloon angioplasty and stenting  Symptoms did improve with SFA stenting for which patient will be a monitored with serial Dopplers and medical management  Continue management of vascular risk factors   Recommend adherence to a heart healthy diet         Relevant Orders    Vitamin B12    CBC and Platelet    Hemoglobin A1C       Nervous and Auditory    Dementia ([facility] ) - Primary     MOCA 16/30, GDS 3/15, TUGT 11 sec  Significant deficits noted in visual spatial, executive function, naming, attention, abstraction, memory and recall domains   given history, physical exam and neurocognitive screening, this places the patient in level of mild dementia  Etiology likely multifactorial  Will order CBC, CMP, vitamin B12, folate, TSH with reflex T4   Will order lipid panel, HbA1c to assess vascular risk factors  Will order a comprehensive mine stream testing   Will order MRI neuro quant of the brain  Would recommend referral to speech therapy for cognitive retraining   Recommend apps such as [employer]  com for cognitively stimulating online games  Would recommend referral for a fit to drive Test to assess driving safety   Recommend the Mediterranean diet which has shown to decrease the risk of memory loss and CVA  Will recommend a falls Alert device as a safety precaution   Will recommend blister packaging for ease of medication administration   Currently at a level of mild dementia, the patient should have some degree of supervision at home  to ensure safety, supervision, compliance with medication and meals  Would recommend the patient enroll in a senior center positive socialization and physical activity   Will discuss pharmacotherapy options at care plan conference pending complete neurocognitive workup an assessment   Recommend reorientation and redirection as needed  Manage chronic conditions  Maintain Falls precautions  Encourage patient to remain active mentally, physically and socially   Patient to participate in cognitively challenging exercises  Such as cross words and puzzles           Relevant Orders    MRI brain NeuroQuant wo contrast    Folate (Folic Acid)    Lipid panel    Comprehensive metabolic panel    TSH, 3rd generation with Free T4 reflex    Vitamin B12    CBC and Platelet    Hemoglobin A1C    MindStreams Assessment       Other    Hx of CABG     Patient denies any cardiorespiratory distress   Imperative to manage this ocular risk factors   Continue aspirin, niacin,lovaza  Recommend adherence to a heart healthy diet         Relevant Orders    Comprehensive metabolic panel    TSH, 3rd generation with Free T4 reflex    Hemoglobin A1C    Depression      GDS 3/15   Patient denies any homicidal suicidal ideation  Currently on paroxetine 10 mg daily   Discussed with daughter about anticholinergic side effects of paroxetine in elderly patients   Would recommend weaning the patient off paroxetine and trialing with an alternative SSRI agent safer in elderly patients   Will discuss further at care plan conference  Continued social support by family and         Abnormal laboratory test    Elevated blood pressure reading      /72   Patient currently not on antihypertensive therapy  Would recommend close follow-up with PCP to determine whether blood pressure is  persistently elevated  and treat as needed given history of PAD, CAD, dementia  Recommend adherence to a low-sodium, heart healthy diet  Physical activity as able as part of an exercise routine         Other constipation     Encourage increased fiber intake, increased water   Patient currently on Dulcolax, colace  Consider prune juice 3-4 oz daily  Physical activity as able           Other Visit Diagnoses     Abnormal finding of blood chemistry, unspecified         Relevant Orders    Lipid panel    Hemoglobin A1C               Reason for visit is   Chief Complaint   Patient presents with    Virtual Regular Visit        Encounter provider Jessica Lam MD    Provider located at [facility]  [place] [address] [id number]      Recent Visits  Date Type Provider Dept   02/05/21 Office Visit Jessica Lam  [address]   02/04/21 Telephone Eim Dumont, Kishan Kramer 1552 recent visits within past 7 days and meeting all other requirements     Future Appointments  No visits were found meeting these conditions  Showing future appointments within next 150 days and meeting all other requirements        The patient was identified by name and date of birth  Nel Reyes was informed that this is a telemedicine visit and that the visit is being conducted through [employer] and patient was informed that this is a secure, HIPAA-compliant platform  He agrees to proceed  My office door was closed  No one else was in the room  He acknowledged consent and understanding of privacy and security of the video platform  The patient has agreed to participate and understands they can discontinue the visit at any time  Patient is aware this is a billable service  Subjective  Nel Reyes is a 79 y o  male who  Presents for his initial comprehensive geriatric neurocognitive assessment  HPI:  We had the pleasure of evaluating Nel Reyes who is a 79 y o  male   in Geriatric consultation today      He lives with his mother and her caretaker  Mr Claudeen Cone is in the office with his daughter      Memory Issues noticed since  2-3 years  Memory affected: short term memory loss    Symptoms started: gradual  Over time the memory has: worsened  Memory issue(s) were noted by: family   Patient has difficulties with  recalling short-term events    He has problems operating household appliance such as TV remote, kitchen appliances, computer  This is a 56-year-old male with peripheral artery disease, CAD s/p CABG,  asymptomatic carotid artery stenosis, depression, constipation, right leg claudication  and cognitive impairment who presents for his initial comprehensive geriatric assessment  The patient currently resides in a one-ho home with his 80year old mother and her caretaker who is present 24 hours /7 days a week  He has been  for the past 5 5 years after which daughter feels the patient's decline began  The patient did complete an associate's degree and then worked  as an  with the [employer] 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  The patient continues to work at his auto shop despite episodes of confusion over the past 1 5 years  The patient has been following with his PCP and has had MMSEs done in the past   Per daughter, who is an NP, the most recent MMSE was done 1 5 years ago however she is unsure about the actual scores  Daughter explains  PCP stated there was no significant cognitive deficits and attributed patient's confusion and forgetfulness to his depression  for which he was started on Paxil  The patient remains independent in all ADLs and most IADLs  Daughter does currently manage his finances in addition to the patient's   She does admit that on occasion the patient will require writing checks for work and has has no difficulty doing so  He does continue to drive and has had no episodes of getting lost in familiar places, being involved in motor vehicle accidents or having dents and scratches on his car  Daughter does admit that she has been driving him around more frequently as he has had some "near misses"      The patient does follow with vascular surgery for a history of asymptomatic carotid artery stenosis as well as lower extremity claudication  He also has a history of CABG  Recently, daughter has noticed the patient has been sleeping with all the lights on in his house  He does get occasionally angry which may be related to familial issues or his business  He also has a history of constipation and has been on stool softeners as well as Dulcolax with good effect  He currently is in a relationship with a female friend whom he talks to via the telephone  Daughter feels this has helped his mental health somewhat  He does occasionally drink 2-3 glasses of wine however not on a daily basis  No cardiorespiratory distress, fever, chills, lethargy, URI or urinary symptoms  He has been tolerating oral intake, sleeping well and denied any falls in the past 6 months  He has difficulty finding the right word while speaking: Yes  Patient requires repeat information or ask the same question repeatedly: No  Do you drive: Yes       Have you had any recent accidents, citations or getting lost in familiar places :No  Do you handle your own financial affairs such as balancing your checkbook, paying bills, investments: No  Do have any difficulties with handling your financial affairs: No  Have you or your family noted any change in your mood or personality:Yes  Are you currently or have you been treated in the past for depression or anxiety: Yes  Have you noticed any gait or balance disorder: No  Uses :none  Any hallucination or delusion: No  Fluctuation in alertness: No  Sleep Issues: Yes  Urinary/Stool Incontinence: No  Hearing and vision issue: No  Do you have POA:Yes  Do you have a Living will Yes  Past Medical, surgical, social, medication and allergy history and patients previous records reviewed  Family Review of Behavior [facility]:    pacing  No    agressive/combative behavior  No    agitated  No   wandering  No   resistance to care   No   hoarding/hiding objects  No    suspicious  No  Withdrawn: no  inappropriate sexual behaviorNo  rummaging/pillaging  No    misplacing/losing objects Yes  personal hygiene problems  No  forgetfulness of actions Yes   temper outbursts  No     throwing items No      Family member with dementia and what type? no  Have you had any head trauma No  Does patient have history of alcohol abuse No        Past Medical History:   Diagnosis Date    [facility]     Claudication in peripheral vascular disease [facility]     High cholesterol        Past Surgical History:   Procedure Laterality Date    BYPASS GRAFT  1996    CAROTID STENT  2012    COLONOSCOPY      CORONARY ARTERY BYPASS GRAFT  1995    IR AORTAGRAM WITH RUN-OFF  7/2/2020    [place] SLCTV CATHJ 3RD+ ORD SLCTV ABDL PEL/LXTR [facility] Right 7/2/2020    Procedure: ARTERIOGRAM, RIGHT LEG ANGIOGRAM, BALLOON ANGIOPLASTY AND STENTING OF RIGHT SFA;  Surgeon: Jesus Jin MD;  [facility];  Service: Vascular    VASCULAR SURGERY         Current Outpatient Medications   Medication Sig Dispense Refill    aspirin (ECOTRIN LOW STRENGTH) 81 mg EC tablet Take 81 mg by mouth daily      PARoxetine (PAXIL) 10 mg tablet Take 10 mg by mouth daily  0    Red Yeast Rice 600 MG TABS Take by mouth daily       Apoaequorin (PREVAGEN PO) Take by mouth      FOLBIC 2 5-25-2 MG Take 1 tablet by mouth daily  4    niacin (NIASPAN) 1000 MG CR tablet daily   3    omega-3-acid ethyl esters (LOVAZA) 1 g capsule Take 2 g by mouth 2 (two) times a day       No current facility-administered medications for this visit  No Known Allergies    Review of Systems   Constitutional: Negative for chills and fever  HENT: Negative for congestion, ear pain, rhinorrhea and sore throat  Eyes: Negative for discharge  Respiratory: Negative for cough, chest tightness, shortness of breath, wheezing and stridor  Cardiovascular: Positive for leg swelling (chronic left leg swelling)   Negative for chest pain and palpitations  Gastrointestinal: Positive for constipation  Negative for abdominal pain, diarrhea, nausea and vomiting  Genitourinary: Negative for decreased urine volume, dysuria and hematuria  Musculoskeletal: Negative for gait problem  Skin: Negative for color change, pallor, rash and wound  Neurological: Negative for dizziness and weakness  Psychiatric/Behavioral: Positive for decreased concentration and dysphoric mood  Negative for sleep disturbance  Video Exam    Vitals:    02/05/21 1509   BP: 162/72   BP Location: Left arm   Patient Position: Sitting   Cuff Size: Standard   Pulse: 75   Resp: 18   Temp: 98 1 °F (36 7 °C)   TempSrc: Temporal   SpO2: 98%   Weight: 69 2 kg (152 lb 9 6 oz)   Height: 5' 4" (1 626 m)       Physical Exam  Vitals signs reviewed  Constitutional:       General: He is not in acute distress  Appearance: Normal appearance  He is not ill-appearing  Comments: Elderly male sitting comfortably in chair in no obvious cardiorespiratory or painful distress   HENT:      Head: Normocephalic and atraumatic  Right Ear: External ear normal       Left Ear: External ear normal       Nose: Nose normal       Mouth/Throat:      Mouth: Mucous membranes are moist    Eyes:      General:         Right eye: No discharge  Left eye: No discharge  Conjunctiva/sclera: Conjunctivae normal    Neck:      Musculoskeletal: Normal range of motion and neck supple  Cardiovascular:      Rate and Rhythm: Normal rate  Pulmonary:      Effort: Pulmonary effort is normal  No respiratory distress  Abdominal:      General: There is no distension  Palpations: Abdomen is soft  Tenderness: There is no abdominal tenderness  Musculoskeletal: Normal range of motion  General: Swelling (Chronic left lower extremity edema) present  Left lower leg: Edema present  Skin:     General: Skin is dry  Neurological:      General: No focal deficit present  Mental Status: He is alert and oriented to person, place, and time  Mental status is at baseline  Cranial Nerves: No cranial nerve deficit  Motor: No weakness  Gait: Gait normal    Psychiatric:         Mood and Affect: Mood normal           I spent 60 minutes directly with the patient during this visit      VIRTUAL VISIT DISCLAIMER    Stephanie Cunha acknowledges that he has consented to an online visit or consultation  He understands that the online visit is based solely on information provided by him, and that, in the absence of a face-to-face physical evaluation by the physician, the diagnosis he receives is both limited and provisional in terms of accuracy and completeness  This is not intended to replace a full medical face-to-face evaluation by the physician  Stephanie Cunha understands and accepts these terms

## 2021-02-06 PROBLEM — K59.09 OTHER CONSTIPATION: Status: ACTIVE | Noted: 2021-02-06

## 2021-02-06 PROBLEM — R03.0 ELEVATED BLOOD PRESSURE READING: Status: ACTIVE | Noted: 2021-02-06

## 2021-02-06 NOTE — PROGRESS NOTES
[address] [place] [address], [address] [facility]  [phone]  Social Work Intake    SOCIAL HISTORY  Patient: Kevin Haynes  Date:2/6/2021  Current Living Situation: Mr Cait Shetty resides at home with his 59-year-old mother  The home is all on one level  There is an aide who is there 24/7 for her supervision  This aide also assists with meal preparation  He also owns a home in [place], part of which is rented  He will drive back and forth between the properties  Caregiver main concern(s):  Determine diagnosis and review recommendations  Is respite needed for caregiver(s)? Not at this time  Who is available to provide care in case of illness or crisis (please specify relation to patient and level of care able to provide)? Care aide may be able to provide some assistance    FAMILY BACKGROUND  Marital status:    Does patient have children? 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 Yes   Funds available for home care? Yes   Funds available for nursing home? Yes   Do you rent or own your home? Own    RELATIONSHIPS  Are any relationships causing problems right now: No    [address] and Organizations:  Work at auto repair shop  Major life events in past two years (ex: longterm, death in family, major illness etc ): Taffy Goltz' wife passed away 5 years ago  Does the patient currently drive: Yes     If not, date stopped driving: Shelby Manuel does report that she will drive if they are going longer distances    COMMUNITY SERVICES  Agencies which have helped with shopping, meals, bathing, etc : None reported    LEGAL  Advanced directives: has an advanced directive - a copy has been provided    [address]   Does the residence have smoke alarm? Yes     Does the residence have a fire escape? Yes   Are any of the following present in the residence? Frayed Wires       No   Clutter        No   Incorrect use of open flame     No    FALL HAZARDS  Do any of the following exist in the residence? Poor lighting       No   Loose Rugs       No   Obstacles       No   Uneven floors       No   Slippery floors       No   Unsafe stairs       No  Does the patient use a fall alert? No   If yes, which one? N/A    HEALTH HAZARDS   Does the residence have any of the following? Adequate plumbing      Yes    Adequate heat       Yes    Adequate ventilation      Yes   Are there signs of neglect such as the following? Unkempt house      No   Old food in refrigerator     No   Infestation       No    EMERGENCY HEALTH PLAN   Is there a phone that is accessible to the patient or caregiver? Yes   Is the number to police, physician, and 911 easily accessible? No  Would the patient be able to call 911 in an emergency? Yes     ENVIRONMENT APPROPRIATENESS  Please note if each is available and accessible to the patient:   [address]        Yes   Kitchen        Yes   Living Room        Yes     Is the patient able to climb stairs if necessary? Yes   Does the patient use any assistant devices for ambulation? No   If yes, please list which device required   N/A    Does the bathroom have any of the following? Handrails in tub or toilet     Yes    Raised toilet seat      Yes    Rubber tub mat      Yes    Hot water       Yes   *There is a handicap accessible bathroom used by Destiny Davis' mother  His bathroom is not fully equipped  Can the patient independently do the following?    Shower       Yes    Dress them selves      Yes     Pick appropriate clothing     Yes    Eat        Yes    Drink        Yes       MoCA Version 8 1  Education: Associates Degree    Points Earned POSSIBLE Points   Visuospatial/Executive   Alternating [place] Making 0 1   Visuoconstructional skills (cube) 0 1   Visuoconstructional skills (clock) 3 3   Naming   Naming Animals 1 3   Attention   Digit Span 1 2   Vigilance (letters) 1 1   Serial 7 subtraction 1 3   Language   Sentence Repetition 2 2   Verbal fluency 1 1   Abstraction   Abstraction (word pairings) 0 2   Delayed recall   Delayed recall 0 5   Memory index score: 2/15   Orientation   Orientation 6 6   TOTAL SCORE: 16/30  (Normal ?26/30)   Additional notes: Avery Neal did wear reading glasses during the visual portion of this MoCA

## 2021-02-06 NOTE — ASSESSMENT & PLAN NOTE
Patient denies any cardiorespiratory distress   Imperative to manage this ocular risk factors   Continue aspirin, niacin,lovaza  Recommend adherence to a heart healthy diet

## 2021-02-06 NOTE — ASSESSMENT & PLAN NOTE
GDS 3/15   Patient denies any homicidal suicidal ideation  Currently on paroxetine 10 mg daily   Discussed with daughter about anticholinergic side effects of paroxetine in elderly patients   Would recommend weaning the patient off paroxetine and trialing with an alternative SSRI agent safer in elderly patients   Will discuss further at care plan conference  Continued social support by family and

## 2021-02-06 NOTE — ASSESSMENT & PLAN NOTE
[address] [place] [address] 16/30, GDS 3/15, TUGT 11 sec  Significant deficits noted in visual spatial, executive function, naming, attention, abstraction, memory and recall domains   given history, physical exam and neurocognitive screening, this places the patient in level of mild dementia  Etiology likely multifactorial  Will order CBC, CMP, vitamin B12, folate, TSH with reflex T4   Will order lipid panel, HbA1c to assess vascular risk factors  Will order a comprehensive mine stream testing   Will order MRI neuro quant of the brain  Would recommend referral to speech therapy for cognitive retraining   Recommend apps such as alzlife  com for cognitively stimulating online games  Would recommend referral for a fit to drive Test to assess driving safety   Recommend the Mediterranean diet which has shown to decrease the risk of memory loss and CVA  Will recommend a falls Alert device as a safety precaution   Will recommend blister packaging for ease of medication administration   Currently at a level of mild dementia, the patient should have some degree of supervision at home  to ensure safety, supervision, compliance with medication and meals  Would recommend the patient enroll in a senior center positive socialization and physical activity   Will discuss pharmacotherapy options at care plan conference pending complete neurocognitive workup an assessment   Recommend reorientation and redirection as needed  Manage chronic conditions  Maintain Falls precautions  Encourage patient to remain active mentally, physically and socially   Patient to participate in cognitively challenging exercises  Such as cross words and puzzles

## 2021-02-06 NOTE — ASSESSMENT & PLAN NOTE
/72   Patient currently not on antihypertensive therapy  Would recommend close follow-up with PCP to determine whether blood pressure is  persistently elevated  and treat as needed given history of PAD, CAD, dementia  Recommend adherence to a low-sodium, heart healthy diet  Physical activity as able as part of an exercise routine

## 2021-02-06 NOTE — ASSESSMENT & PLAN NOTE
History of right leg claudication and asymptomatic bilateral carotid artery stenosis   Continues to follow with vascular surgery   S/p Right lower extremity angiogram with  balloon angioplasty and stenting  Symptoms did improve with SFA stenting for which patient will be a monitored with serial Dopplers and medical management  Continue management of vascular risk factors   Recommend adherence to a heart healthy diet

## 2021-02-06 NOTE — ASSESSMENT & PLAN NOTE
Encourage increased fiber intake, increased water   Patient currently on Dulcolax, colace  Consider prune juice 3-4 oz daily  Physical activity as able

## 2021-02-08 ENCOUNTER — HOSPITAL ENCOUNTER (OUTPATIENT)
Dept: RADIOLOGY | Facility: HOSPITAL | Age: 81
Discharge: HOME | End: 2021-02-08
Payer: MEDICARE

## 2021-02-08 DIAGNOSIS — F03.90 DEMENTIA WITHOUT BEHAVIORAL DISTURBANCE, UNSPECIFIED DEMENTIA TYPE (HCC): ICD-10-CM

## 2021-02-08 PROCEDURE — 70551 MRI BRAIN STEM W/O DYE: CPT

## 2021-02-08 PROCEDURE — G1004 CDSM NDSC: HCPCS

## 2021-02-09 DIAGNOSIS — Z23 ENCOUNTER FOR IMMUNIZATION: ICD-10-CM

## 2021-02-11 ENCOUNTER — IMMUNIZATIONS (OUTPATIENT)
Dept: FAMILY MEDICINE CLINIC | Facility: HOSPITAL | Age: 81
End: 2021-02-11

## 2021-02-11 DIAGNOSIS — Z23 ENCOUNTER FOR IMMUNIZATION: Primary | ICD-10-CM

## 2021-02-11 PROCEDURE — 91301 SARS-COV-2 / COVID-19 MRNA VACCINE (MODERNA) 100 MCG: CPT | Performed by: INTERNAL MEDICINE

## 2021-02-11 PROCEDURE — 0011A SARS-COV-2 / COVID-19 MRNA VACCINE (MODERNA) 100 MCG: CPT | Performed by: INTERNAL MEDICINE

## 2021-02-12 ENCOUNTER — OFFICE VISIT (OUTPATIENT)
Dept: GERIATRICS | Age: 81
End: 2021-02-12
Payer: MEDICARE

## 2021-02-12 DIAGNOSIS — R41.3 MEMORY LOSS: ICD-10-CM

## 2021-02-12 PROCEDURE — 96138 PSYCL/NRPSYC TECH 1ST: CPT | Performed by: STUDENT IN AN ORGANIZED HEALTH CARE EDUCATION/TRAINING PROGRAM

## 2021-02-12 PROCEDURE — 96139 PSYCL/NRPSYC TST TECH EA: CPT | Performed by: STUDENT IN AN ORGANIZED HEALTH CARE EDUCATION/TRAINING PROGRAM

## 2021-02-12 NOTE — PROGRESS NOTES
[address] [facility]  [address]  [phone]    Patient was here today for mind stream comprehensive test it took the patient 1 hr and 10 minutes to complete

## 2021-02-16 ENCOUNTER — LAB (OUTPATIENT)
Dept: LAB | Facility: CLINIC | Age: 81
End: 2021-02-16
Payer: MEDICARE

## 2021-02-16 ENCOUNTER — TRANSCRIBE ORDERS (OUTPATIENT)
Dept: LAB | Facility: CLINIC | Age: 81
End: 2021-02-16

## 2021-02-16 DIAGNOSIS — F03.90 DEMENTIA WITHOUT BEHAVIORAL DISTURBANCE, UNSPECIFIED DEMENTIA TYPE (HCC): ICD-10-CM

## 2021-02-16 DIAGNOSIS — R79.9 ABNORMAL FINDING OF BLOOD CHEMISTRY, UNSPECIFIED: ICD-10-CM

## 2021-02-16 DIAGNOSIS — Z95.1 HX OF CABG: ICD-10-CM

## 2021-02-16 LAB
ALBUMIN SERPL BCP-MCNC: 3.5 G/DL (ref 3.5–5)
ALP SERPL-CCNC: 100 U/L (ref 46–116)
ALT SERPL W P-5'-P-CCNC: 38 U/L (ref 12–78)
ANION GAP SERPL CALCULATED.3IONS-SCNC: 3 MMOL/L (ref 4–13)
AST SERPL W P-5'-P-CCNC: 28 U/L (ref 5–45)
BILIRUB SERPL-MCNC: 0.65 MG/DL (ref 0.2–1)
BUN SERPL-MCNC: 17 MG/DL (ref 5–25)
CALCIUM SERPL-MCNC: 9.1 MG/DL (ref 8.3–10.1)
CHLORIDE SERPL-SCNC: 106 MMOL/L (ref 100–108)
CHOLEST SERPL-MCNC: 186 MG/DL (ref 50–200)
CO2 SERPL-SCNC: 32 MMOL/L (ref 21–32)
CREAT SERPL-MCNC: 0.82 MG/DL (ref 0.6–1.3)
ERYTHROCYTE [DISTWIDTH] IN BLOOD BY AUTOMATED COUNT: 13.4 % (ref 11.6–15.1)
EST. AVERAGE GLUCOSE BLD GHB EST-MCNC: 114 MG/DL
FOLATE SERPL-MCNC: >20 NG/ML (ref 3.1–17.5)
GFR SERPL CREATININE-BSD FRML MDRD: 84 ML/MIN/1.73SQ M
GLUCOSE P FAST SERPL-MCNC: 102 MG/DL (ref 65–99)
HBA1C MFR BLD: 5.6 %
HCT VFR BLD AUTO: 45.7 % (ref 36.5–49.3)
HDLC SERPL-MCNC: 55 MG/DL
HGB BLD-MCNC: 14.6 G/DL (ref 12–17)
LDLC SERPL CALC-MCNC: 121 MG/DL (ref 0–100)
MCH RBC QN AUTO: 29.9 PG (ref 26.8–34.3)
MCHC RBC AUTO-ENTMCNC: 31.9 G/DL (ref 31.4–37.4)
MCV RBC AUTO: 94 FL (ref 82–98)
NONHDLC SERPL-MCNC: 131 MG/DL
PLATELET # BLD AUTO: 172 THOUSANDS/UL (ref 149–390)
PMV BLD AUTO: 10.9 FL (ref 8.9–12.7)
POTASSIUM SERPL-SCNC: 4 MMOL/L (ref 3.5–5.3)
PROT SERPL-MCNC: 7.2 G/DL (ref 6.4–8.2)
RBC # BLD AUTO: 4.88 MILLION/UL (ref 3.88–5.62)
SODIUM SERPL-SCNC: 141 MMOL/L (ref 136–145)
TRIGL SERPL-MCNC: 52 MG/DL
TSH SERPL DL<=0.05 MIU/L-ACNC: 1.57 UIU/ML (ref 0.36–3.74)
VIT B12 SERPL-MCNC: 1014 PG/ML (ref 100–900)
WBC # BLD AUTO: 4.72 THOUSAND/UL (ref 4.31–10.16)

## 2021-02-16 PROCEDURE — 83036 HEMOGLOBIN GLYCOSYLATED A1C: CPT | Performed by: STUDENT IN AN ORGANIZED HEALTH CARE EDUCATION/TRAINING PROGRAM

## 2021-02-16 PROCEDURE — 80061 LIPID PANEL: CPT

## 2021-02-16 PROCEDURE — 80053 COMPREHEN METABOLIC PANEL: CPT

## 2021-02-16 PROCEDURE — 85027 COMPLETE CBC AUTOMATED: CPT | Performed by: STUDENT IN AN ORGANIZED HEALTH CARE EDUCATION/TRAINING PROGRAM

## 2021-02-16 PROCEDURE — 82746 ASSAY OF FOLIC ACID SERUM: CPT

## 2021-02-16 PROCEDURE — 36415 COLL VENOUS BLD VENIPUNCTURE: CPT

## 2021-02-16 PROCEDURE — 82607 VITAMIN B-12: CPT | Performed by: STUDENT IN AN ORGANIZED HEALTH CARE EDUCATION/TRAINING PROGRAM

## 2021-02-16 PROCEDURE — 84443 ASSAY THYROID STIM HORMONE: CPT | Performed by: STUDENT IN AN ORGANIZED HEALTH CARE EDUCATION/TRAINING PROGRAM

## 2021-03-09 ENCOUNTER — IMMUNIZATIONS (OUTPATIENT)
Dept: FAMILY MEDICINE CLINIC | Facility: HOSPITAL | Age: 81
End: 2021-03-09

## 2021-03-09 DIAGNOSIS — Z23 ENCOUNTER FOR IMMUNIZATION: Primary | ICD-10-CM

## 2021-03-09 PROCEDURE — 0012A SARS-COV-2 / COVID-19 MRNA VACCINE (MODERNA) 100 MCG: CPT

## 2021-03-09 PROCEDURE — 91301 SARS-COV-2 / COVID-19 MRNA VACCINE (MODERNA) 100 MCG: CPT

## 2021-04-02 ENCOUNTER — OFFICE VISIT (OUTPATIENT)
Dept: GERIATRICS | Age: 81
End: 2021-04-02
Payer: MEDICARE

## 2021-04-02 VITALS
OXYGEN SATURATION: 97 % | HEIGHT: 62 IN | HEART RATE: 82 BPM | RESPIRATION RATE: 18 BRPM | TEMPERATURE: 96.1 F | SYSTOLIC BLOOD PRESSURE: 128 MMHG | WEIGHT: 149 LBS | DIASTOLIC BLOOD PRESSURE: 64 MMHG | BODY MASS INDEX: 27.42 KG/M2

## 2021-04-02 DIAGNOSIS — F03.90 DEMENTIA WITHOUT BEHAVIORAL DISTURBANCE, UNSPECIFIED DEMENTIA TYPE (HCC): Primary | ICD-10-CM

## 2021-04-02 DIAGNOSIS — F33.0 MILD EPISODE OF RECURRENT MAJOR DEPRESSIVE DISORDER (HCC): ICD-10-CM

## 2021-04-02 DIAGNOSIS — K59.09 OTHER CONSTIPATION: ICD-10-CM

## 2021-04-02 DIAGNOSIS — Z95.1 HX OF CABG: ICD-10-CM

## 2021-04-02 DIAGNOSIS — I73.9 PAD (PERIPHERAL ARTERY DISEASE) (HCC): ICD-10-CM

## 2021-04-02 DIAGNOSIS — H93.13 TINNITUS OF BOTH EARS: ICD-10-CM

## 2021-04-02 PROBLEM — I74.3 EMBOLISM AND THROMBOSIS OF ARTERIES OF THE LOWER EXTREMITIES (HCC): Status: ACTIVE | Noted: 2021-04-02

## 2021-04-02 PROCEDURE — 99215 OFFICE O/P EST HI 40 MIN: CPT | Performed by: STUDENT IN AN ORGANIZED HEALTH CARE EDUCATION/TRAINING PROGRAM

## 2021-04-02 NOTE — PATIENT INSTRUCTIONS
[employer]    [email or web address]    Now offering online classes that can be taken at home  Courses are also offered at [employer] in [place], [address] [facility], [employer] in [place], and [employer]  You can search online for locations near you, by zipcode!

## 2021-04-04 NOTE — PROGRESS NOTES
[address]  [phone]  Care Conference    NAME: Isabel Vu  AGE: [de-identified] y o  SEX: male    DATE OF ENCOUNTER: 4/2/2021  Family Present: Nevaeh Sullivano (daughter), Rafael Salmeron (daughter)  Staff Present: Cayla Espinoza  Location of Conference: [facility]      Assessment and Plan     Medical Problems: Tinnitus, depression, constipation, PAD, CAD s/p CABG    Geriatric Syndromes/Age Related Syndromes:  Mild dementia, depression, constipation     1  Neuropsychological:    Dementia-Mild   MoCA: 16/30   Mindstream:  83 6 (global cognitive score), 85 9 (memory  score)  Reviewed TSH, B12, folate  MRI neuro quant of the brain showing cerebral volume loss more prominent within left temporal lobe  Low hippocampal volume, local ex vacuo dilatation, abnormal hippocampal occupancy score  Findings support medial temporal lobe focused neurodegenerative etiology  Recommend starting vitamin E 800 units b i d  Discussed pharmacotherapy options with Aricept which is indicated in mild dementia with etiology related to Alzheimer's disease  Would defer given patient's significant history of vascular disease and cardiac side effects of Aricept  Will refer to speech therapy for cognitive rehabilitation  Will refer to occupational therapy for fit to drive   Recommend the Mediterranean diet which has shown to decrease the risk of memory loss and CVA   Consider online games such as luminosity  com  Consider enrolling in senior center such as [facility] for positive socialization and physical activity  Remain active physically, mentally and socially, offer reorientation, redirection (depending on situation)  Engage in cognitively challenging activities (stimulating puzzles)  Maintain chronic conditions under control   Repeat cognitive assessment in 6 months     Depression Screen:   Geriatric Depression Scale: 3/15   Patient has successfully weaned off paroxetine Continue social support by family and friends    Patient denies any homicidal or suicidal ideation    Daughter to call the office should family decided to restart  treatment with a safer agent such as Zoloft        2  Physical Finding Impacting Function:   TUGT: 11 seconds   Fall Risk Assessment: low   Activities of Daily Living: Independent   Instrumental Activities of Daily Living: Independent    Fall prevention measures  Might recommend assistance with ADLs and IADLs  Physical Therapy recommended: no     3  Medications:   Medications reviewed   Previously discussed side effects of paroxetine given its increased anticholinergic side effects and use in elderly patients  Weaning schedule previously given to daughter and patient has successfully been weaned off this medication  Patient advised to avoid over the counter medications that can affect cognition (e g , Benadryl, Tylenol PM, NSAIDs)     4  Other Findings:   BMI: 26 97   Maintain well-balanced diet     Peripheral artery disease    Continue routine follow-up with vascular surgery, serial  Doppler monitoring, medical management      History of CABG    Imperative to manage vascular risk factors    Continue aspirin, niacin, Lovaza      Depression     Daughter gives example of patient becoming excessively  angry with  recently (while still on paroxetine )     Patient successfully weaned off paroxetine safely    Patient denied any homicidal or suicidal ideation   Discussed with family importance of continued social  support and encouraging patient to remain active socially,  mentally and physically    Family to call the office should they wish to start an  alternative antidepressive therapy       Tinnitus   Patient currently on aspirin however given significant  vascular history would continue aspirin therapy   Will refer to ENT      Constipation    Encourage increased fiber intake, increased water intake    Continue Colace, Dulcolax    Consider prune juice 3-4 oz daily   Physical activity as able     5  Health Maintenance     Immunization History   Administered Date(s) Administered    SARS-CoV-2 / COVID-19 mRNA IM (Moderna) 02/11/2021, 03/09/2021       Recommend Flu vaccine yearly, if not contraindicated  Recommend Pneumo vaccine every 5 years, if not contraindicated   Recommend Shingles vaccine (Zostavax), if not  contraindicated     6  Social/Safety Concerns  Assistance/ supervision  Fall risk   Medication management  Socialization     Recommendations / interventions  Due to mild dementia, ensure there is some degree of supervision in place at home which patient currently does have to ensure compliance with medication and meals   Recommend use of falls Alert device as a safety precaution   Consider assistance for medication administration such as blister packaging   Recommend participating in local senior center activities for positive socialization, cognitive and physical activity     7  Diagnostic Studies   TSH, Vitamin B12, Folate and MRI     8  Long Term Care Issues   Advance Directives     Do you have a:   1  Living Will yes   2  Durable Power of  yes    Consider caregiver support group (information provided)   Information provided for long-term care facilities    Patient and family verbalized understanding of above Care Plan  History of Present Illness     HPI  Jose Luis Paz is a [de-identified] y o  male who is in the office for his [address] [place] [address]  History obtained from patient and family members  Denies any new complaints, falls, hospitalization  Past Medical, Social, Surgical, Medications and Allergy history reviewed  Review of Systems     Review of Systems   Constitutional: Negative for activity change, chills and fever  HENT: Positive for tinnitus  Negative for congestion, ear pain, hearing loss, rhinorrhea and sore throat  Eyes: Negative for discharge     Respiratory: Negative for cough, chest tightness, shortness of breath, wheezing and stridor  Cardiovascular: Negative for chest pain, palpitations and leg swelling  Gastrointestinal: Positive for constipation  Negative for abdominal pain, diarrhea, nausea and vomiting  Genitourinary: Negative for decreased urine volume, dysuria and hematuria  Musculoskeletal: Negative for gait problem  Skin: Negative for color change, pallor, rash and wound  Neurological: Negative for dizziness, weakness and light-headedness  Psychiatric/Behavioral: Positive for decreased concentration  Negative for agitation, behavioral problems, confusion, dysphoric mood and sleep disturbance  History     Past Medical History:   Diagnosis Date    [facility] artery disease ([facility] )     Claudication in peripheral vascular disease ([facility] )     High cholesterol      Past Surgical History:   Procedure Laterality Date    BYPASS GRAFT      CAROTID STENT      COLONOSCOPY      CORONARY ARTERY BYPASS GRAFT      IR AORTAGRAM WITH RUN-OFF  2020    SC SLCTV CATHJ 3RD+ ORD SLCTV ABDL PEL/LXTR [facility] Right 2020    Procedure: ARTERIOGRAM, RIGHT LEG ANGIOGRAM, BALLOON ANGIOPLASTY AND STENTING OF RIGHT SFA;  Surgeon: Alice Mina MD;  Location: [facility];  Service: Vascular    VASCULAR SURGERY       Social History     Socioeconomic History    Marital status:       Spouse name: Not on file    Number of children: 3    Years of education: Not on file    Highest education level: Not on file   Occupational History    Not on file   Social Needs    Financial resource strain: Not on file    Food insecurity     Worry: Not on file     Inability: Not on file    Transportation needs     Medical: Not on file     Non-medical: Not on file   Tobacco Use    Smoking status: Former Smoker     Types: Cigarettes     Quit date: 1964     Years since quittin 2    Smokeless tobacco: Never Used   Substance and Sexual Activity    Alcohol use: Yes     Frequency: 2-3 times a week     Drinks per session: 1 or 2     Comment: social     Drug use: Never    Sexual activity: Not Currently   Lifestyle    Physical activity     Days per week: Not on file     Minutes per session: Not on file    Stress: Not on file   Relationships    Social connections     Talks on phone: Not on file     Gets together: Not on file     Attends Judaism service: Not on file     Active member of club or organization: Not on file     Attends meetings of clubs or organizations: Not on file     Relationship status: Not on file    Intimate partner violence     Fear of current or ex partner: Not on file     Emotionally abused: Not on file     Physically abused: Not on file     Forced sexual activity: Not on file   Other Topics Concern    Not on file   Social History Narrative    Not on file     Current Outpatient Medications   Medication Sig Dispense Refill    Apoaequorin (PREVAGEN PO) Take by mouth      aspirin (ECOTRIN LOW STRENGTH) 81 mg EC tablet Take 81 mg by mouth daily      FOLBIC 2 5-25-2 MG Take 1 tablet by mouth daily  4    niacin (NIASPAN) 1000 MG CR tablet daily   3    omega-3-acid ethyl esters (LOVAZA) 1 g capsule Take 2 g by mouth 2 (two) times a day      Red Yeast Rice 600 MG TABS Take by mouth daily        No current facility-administered medications for this visit  Family History   Problem Relation Age of Onset    Hypertension Family     Heart disease Family     Stroke Family     No Known Problems Mother     No Known Problems Father        Allergies:  No Known Allergies      Objective      Vitals:  Vitals:    04/02/21 1507   BP: 128/64   Pulse: 82   Resp: 18   Temp: (!) 96 1 °F (35 6 °C)   SpO2: 97%       Physical Exam  Vitals signs reviewed  Constitutional:       General: He is not in acute distress  Appearance: Normal appearance  He is well-developed  He is not ill-appearing or diaphoretic        Comments: Elderly male sitting comfortably in chair in no obvious cardiorespiratory or painful distress   HENT:      Head: Normocephalic and atraumatic  Right Ear: Tympanic membrane, ear canal and external ear normal       Left Ear: Tympanic membrane, ear canal and external ear normal       Nose: Nose normal  No congestion or rhinorrhea  Mouth/Throat:      Mouth: Mucous membranes are moist       Pharynx: Oropharynx is clear  No oropharyngeal exudate  Eyes:      General: No scleral icterus  Right eye: No discharge  Left eye: No discharge  Conjunctiva/sclera: Conjunctivae normal    Neck:      Musculoskeletal: Normal range of motion and neck supple  Vascular: No JVD  Cardiovascular:      Rate and Rhythm: Normal rate and regular rhythm  Heart sounds: Murmur present  No friction rub  No gallop  Pulmonary:      Effort: Pulmonary effort is normal  No respiratory distress  Breath sounds: Normal breath sounds  No wheezing or rales  Abdominal:      General: Bowel sounds are normal  There is no distension  Palpations: Abdomen is soft  Tenderness: There is no abdominal tenderness  Musculoskeletal: Normal range of motion  General: No tenderness or deformity  Skin:     General: Skin is warm  Coloration: Skin is not pale  Findings: No erythema or rash  Neurological:      General: No focal deficit present  Mental Status: He is alert and oriented to person, place, and time  Mental status is at baseline  Cranial Nerves: No cranial nerve deficit  Motor: No weakness  Gait: Gait normal       Deep Tendon Reflexes: Reflexes are normal and symmetric     Psychiatric:         Mood and Affect: Mood normal          Behavior: Behavior normal

## 2021-04-12 ENCOUNTER — TELEPHONE (OUTPATIENT)
Dept: VASCULAR SURGERY | Facility: CLINIC | Age: 81
End: 2021-04-12

## 2021-04-12 NOTE — TELEPHONE ENCOUNTER
Attempted to contact patient to schedule appointment(s) listed below  Requested patient call [phone] option 3 to schedule appointment(s)  Patient's appointment(s) are past due, expected ASAP  Dopplers  [] Carotid (CV)   [] Abdominal Aorta Iliac (AOIL)  [x] Lower Limb Arterial (ALPHONSE)  [] Renal Artery  [] Upper Limb (UEA)  [] EVAR Duplex    Advanced Imaging   [] CT abdomen with/ without contrast  [] CT abdomen with contrast  [] CT abdomen without contrast    [] CT abdomen & pelvis with/ without contrast  [] CT abdomen & pelvis with contrast  [] CT abdomen & pelvis without contrast    [] CTA abdomen    [] CTA abdomen & pelvis    Office Visit   [] New patient, patient last seen over 3 years ago  [x] Risk factor modification- due in November

## 2021-04-21 ENCOUNTER — HOSPITAL ENCOUNTER (OUTPATIENT)
Dept: NON INVASIVE DIAGNOSTICS | Facility: CLINIC | Age: 81
Discharge: HOME | End: 2021-04-21
Payer: MEDICARE

## 2021-04-21 DIAGNOSIS — I73.9 RIGHT LEG CLAUDICATION (HCC): ICD-10-CM

## 2021-04-21 DIAGNOSIS — I73.9 PAD (PERIPHERAL ARTERY DISEASE) (HCC): ICD-10-CM

## 2021-04-21 PROCEDURE — 93926 LOWER EXTREMITY STUDY: CPT

## 2021-04-21 PROCEDURE — 93923 UPR/LXTR ART STDY 3+ LVLS: CPT

## 2021-04-22 PROCEDURE — 93922 UPR/L XTREMITY ART 2 LEVELS: CPT | Performed by: SURGERY

## 2021-04-22 PROCEDURE — 93926 LOWER EXTREMITY STUDY: CPT | Performed by: SURGERY

## 2021-04-23 ENCOUNTER — EVALUATION (OUTPATIENT)
Dept: SPEECH THERAPY | Facility: CLINIC | Age: 81
End: 2021-04-23
Payer: MEDICARE

## 2021-04-23 DIAGNOSIS — R48.8 OTHER SYMBOLIC DYSFUNCTIONS: Primary | ICD-10-CM

## 2021-04-23 DIAGNOSIS — F03.90 DEMENTIA WITHOUT BEHAVIORAL DISTURBANCE, UNSPECIFIED DEMENTIA TYPE (HCC): ICD-10-CM

## 2021-04-23 PROCEDURE — 96125 COGNITIVE TEST BY HC PRO: CPT

## 2021-04-23 NOTE — PROGRESS NOTES
Speech-Language Pathology Initial Evaluation    Today's date: 2021  Patients name: Cher Marks  : 1940  MRN: [id number]  Safety measures: Dementia  Referring provider: Analilia Cain MD    Primary diagnosis/billing code: R 48 8  Secondary diagnosis/billing code: F 03 90    Visit tracking:  -Referring provider: Epic  -Billing guidelines: CMS  -Visit #1/10  -Insurance: Medicare and  [facility]    Subjective comments: "Everyone calls me Azalia Kidney "  How did the patient hear about us? Physician    Patient's goal(s): "To be more stable "    Reason for referral: Change in cognitive status  Prior functional status: Communication effective and appropriate in all situations  Clinically complex situations: N/A    History: Patient is a [de-identified] y o  male who was referred to outpatient skilled Speech Therapy services for a cognitive-linguistic evaluation  Patient has a PMH of Dementia and Depression  Patient currently follows with geriatric medicine  During his last visit (on 2021), he scored a 16/30 on the MoCA  According to recent imaging, results showed decreased cerebral volume within the left temporal lobe as well as low hippocampal volume  Today, Patient reports that he often times forgets the names of his grandchildren, but can recall them after a minute or so  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[place], 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  Patient's, daughter, Rj Duncan, accompanied Patient today  She stated that they would like to schedule FTD testing after completing cognitive-linguistic therapy  Hearing: Decreased (ENT appointment later today for HA consult)   Vision: Decreased (Wears glasses)     Home environment/lifestyle:  At home with mother in law and aid (aid for mother in law)   Highest level of education: Community college   Vocational status: Retired (Autobody )     Mental status: Alert  Behavior status: Cooperative  Communication modalities: Verbal  Rehabilitation prognosis: Good rehab potential to reach the established goals    Assessments    The Cognitive Linguistic Quick Test (CLQT) is designed to measure an individuals five cognitive domains (attention, memory, executive functions, language, and visuospatial skills)  This norm-referenced tool has been standardized on adults ages 25 through 80years old with neurological impairment as a result of CVA, TBI, or dementia  The following results were obtained during the administration of the assessment  Cognitive Domain: Score: Range of Severity:   Attention 144/215 Mild   Memory 137/185 Mild   Executive Functions 11/40 Moderate   Language  24/37 Moderate   Visuospatial Skills  61/105 Mild        *Composite Severity Ratin  0  Mild             Clock Drawin/13 Mild     Patient scored below Criteron Cut Scores on the following subtests: Story Retelling, Symbol Trails, Generative Naming, Mazes and Design Generation  *Patient named 6 concrete category members (animals) in 60 sec (norm=15+)  -- BELOW AVERAGE    *Patient named 6 abstract category members (words beginning with letter 'm') in 60 sec (norm=10+)  -- BELOW AVERAGE    Goals    Short Term Goals  1  Patient will be educated on the use of internal and external memory aids and compensatory strategies with 80% accuracy to facilitate increased recall of routine, personal information, and recent events, to be achieved in 4-6 weeks  2  Patient will complete auditory immediate and short term memory tasks to 80% accuracy to facilitate increased ability to retell narratives and recall information within functional living environment, to be achieved in 4-6 weeks  3  Patient will complete complex auditory attention processing tasks (e g , sentence unscramble, ranking numbers/words, etc ) to improve working memory with 80% accuracy, to be achieved in 4-6 weeks      4  Patient will facilitate planning by completing thought organization tasks (e g , sequencing, deduction puzzles, etc ) with 80% accuracy to facilitate increased executive functioning, working memory, problem solving, and processing skills, to be achieved in 4-6 weeks  5  Patient will complete concrete and abstract categorization tasks to 80% accuracy to facilitate improved generative naming skills and working memory, to be achieved in 4-6 weeks  6  Patient will utilize word finding strategies during semantic feature analysis treatment activity for improved naming and verbal expression skills  Long Term Goals    1  Patient will demonstrate cognitive-communication skills consistent with age and education given use of compensatory strategies when needed to resume baseline activities and responsibilities in home, community, and work/school settings by discharge  2  Patient will complete cognitive-linguistic therapy that addresses patients specific deficits in processing speed, short-term working memory, attention to detail, monitoring, sequencing, and organization skills, with instruction, to alleviate effects of executive functioning disorder deficits by discharge  3  Patient will complete higher-level expressive language tasks (e g , word definitions, idioms, synonym/antonyms, etc) with 80% accuracy to improve functional communication skills by discharge  Impressions/Recommendations    Impressions:   -Patient presents with mild-moderate cognitive-linguistic deficits characterized by decreased short term memory, attention, and executive functions secondary to Dementia  It should be noted that Patient demonstrated strengths in visuospatial skills today  At this time, it is recommended that Patient participate in cognitive-linguistic therapy 1X a week to increase cognitive-linguistic skills and learn compensatory strategies for memory and attention  Prognosis is good based on familial support and Patient motivation       Recommendations:  -Patient would benefit from outpatient skilled Speech Therapy services: Cognitive-Linguistic therapy    -Frequency: 1x weekly  -Duration: 4-6 weeks    -Intervention certification from: 8/26/0738  -Intervention certification to: 75/31/9673    -Intervention comments:   1 hour test administration, scoring, and documentation

## 2021-04-27 ENCOUNTER — OFFICE VISIT (OUTPATIENT)
Dept: SPEECH THERAPY | Facility: CLINIC | Age: 81
End: 2021-04-27
Payer: MEDICARE

## 2021-04-27 DIAGNOSIS — R48.8 OTHER SYMBOLIC DYSFUNCTIONS: Primary | ICD-10-CM

## 2021-04-27 DIAGNOSIS — F03.90 DEMENTIA WITHOUT BEHAVIORAL DISTURBANCE, UNSPECIFIED DEMENTIA TYPE (HCC): ICD-10-CM

## 2021-04-27 PROCEDURE — 92507 TX SP LANG VOICE COMM INDIV: CPT

## 2021-04-27 NOTE — PROGRESS NOTES
Daily Speech Treatment Note    Today's date: 2021  Patients name: Jeannette Hampton  : 1940  MRN: [id number]  Safety measures: Dementia  Referring provider: Marika Galloway MD    Primary diagnosis/billing code: R 48 8  Secondary diagnosis/billing code: F 03 90    Visit tracking:  -Referring provider: Epic  -Billing guidelines: CMS  -Visit #2/10  -Insurance: Medicare and [address] [facility] [address] due 2021    Subjective/Behavioral:  -"What causes the brain to start to forget?"    Objective/Assessment:  -Reviewed testing results and goals in plan care with patient  Patient is in agreement at this time  Short Term Goals  1  Patient will be educated on the use of internal and external memory aids and compensatory strategies with 80% accuracy to facilitate increased recall of routine, personal information, and recent events, to be achieved in 4-6 weeks  Patient and Clinician discussed Dementia/Alzheimer's disease  Clinician explained that there is still a lot that is unknown about the exact cause of the disease, but there are ways to help slow the progression (I e  healthy lifestyle, keeping physically active, cognitive therapy)  2  Patient will complete auditory immediate and short term memory tasks to 80% accuracy to facilitate increased ability to retell narratives and recall information within functional living environment, to be achieved in 4-6 weeks  To target auditory immediate memory:  Patient was verbally presented with word pair associations and was trained using phrase, "When I say ___, you say ___ "  Patient was instructed to recall the second word given in the pair  Patient completed this task in 4/5 (80%) opportunities independently, increasing to 5/5 (100%) opportunities given min verbal cues  To target auditory delayed memory: Patient was instructed to recall words from original task approx  25 minutes later after a distraction (goal #3)    Patient recalled words in 4/5 (80%) opportunities independently, increasing to 5/5 (100%) opportunities given min verbal cues  3  Patient will complete complex auditory attention processing tasks (e g , sentence unscramble, ranking numbers/words, etc ) to improve working memory with 80% accuracy, to be achieved in 4-6 weeks  To target auditory attention: Patient was verbally presented with the a list of 3 words and was instructed to re-order the words in the most logical sequence  Patient completed this task in 12/17 (71%) opportunities independently, increasing to 17/17 (100%) opportunities given one repetition and mod verbal cues  4  Patient will facilitate planning by completing thought organization tasks (e g , sequencing, deduction puzzles, etc ) with 80% accuracy to facilitate increased executive functioning, working memory, problem solving, and processing skills, to be achieved in 4-6 weeks  5  Patient will complete concrete and abstract categorization tasks to 80% accuracy to facilitate improved generative naming skills and working memory, to be achieved in 4-6 weeks  To target word finding and categorization: Patient completed 4X4 category matrices where he was instructed to state words that match both a category and a letter  Patient completed this task in 28/32 (88%) opportunities independently, increasing to 32/32 (100%) opportunities given semantic cues  It should be noted that Patient required increased processing time to complete this task  He also required min verbal cues to remind him what category or letter he was thinking of      6  Patient will utilize word finding strategies during semantic feature analysis treatment activity for improved naming and verbal expression skills  Plan:  -Patient was provided with home exercises/activities to target goals in plan of care at the end of today's session   -Continue with current plan of care

## 2021-05-04 ENCOUNTER — OFFICE VISIT (OUTPATIENT)
Dept: SPEECH THERAPY | Facility: CLINIC | Age: 81
End: 2021-05-04
Payer: MEDICARE

## 2021-05-04 DIAGNOSIS — F03.90 DEMENTIA WITHOUT BEHAVIORAL DISTURBANCE, UNSPECIFIED DEMENTIA TYPE (HCC): ICD-10-CM

## 2021-05-04 DIAGNOSIS — R48.8 OTHER SYMBOLIC DYSFUNCTIONS: Primary | ICD-10-CM

## 2021-05-04 PROBLEM — I70.219 ATHEROSCLER NATIVE ARTERIES THE EXTREMITIES W/INTERMIT CLAUDICATION (HCC): Status: ACTIVE | Noted: 2021-04-02

## 2021-05-04 PROCEDURE — 92507 TX SP LANG VOICE COMM INDIV: CPT

## 2021-05-04 NOTE — ASSESSMENT & PLAN NOTE
Patient has undergone previous right SFA intervention several years ago and recently had undergone balloon angioplasty and stenting of the right SFA in July 2020  right SFA stent is patent without any stenosis  There is a known common femoral stenosis that we would like to continue to watch  As long as he has no claudication I do not feel the need to intervene on the common femoral stenosis as this would be best done with a endarterectomy procedure

## 2021-05-04 NOTE — PROGRESS NOTES
Daily Speech Treatment Note    Today's date: 2021  Patients name: Jozef Almanzar  : 1940  MRN: [id number]  Safety measures: Dementia  Referring provider: Val Gilford, MD    Primary diagnosis/billing code: R 48 8  Secondary diagnosis/billing code: F 03 90    Visit tracking:  -Referring provider: Epic  -Billing guidelines: CMS  -Visit #3/10  -Insurance: Medicare and [address] due 2021    Subjective/Behavioral:  -"I'm good "    Objective/Assessment:  -Reviewed patient's home exercises/activities completed since last appointment  Patinet completed crossword with 100% accuracy  Patient completed letter fill ins with 100% accuracy  Patient completed abstract category task with 100% accuracy  Short Term Goals  1  Patient will be educated on the use of internal and external memory aids and compensatory strategies with 80% accuracy to facilitate increased recall of routine, personal information, and recent events, to be achieved in 4-6 weeks  2  Patient will complete auditory immediate and short term memory tasks to 80% accuracy to facilitate increased ability to retell narratives and recall information within functional living environment, to be achieved in 4-6 weeks  To target immediate memory: Patient was presented with an array of 16 words and was instructed to divide them into four categories  Patient divided words with 100% accuracy  Patient was then given one minute to study the word lists  The paper was then taken away from the Patient and he was asked to re-write the list from memory  Patient completed this task in 4/16 (25%) opportunities independently, increasing to 15/16 (94%) opportunities given semantic cues       To target delayed memory: Patient was presented with a blank sheet of paper approx  10 minutes later and was asked to re-write the words from task above    Patient completed this task in 8/16 (50%) opportunities independently, increasing to 16/16 (100%) opportunities given semantic cues  3  Patient will complete complex auditory attention processing tasks (e g , sentence unscramble, ranking numbers/words, etc ) to improve working memory with 80% accuracy, to be achieved in 4-6 weeks  To target auditory attention: Patient was verbally presented with a list of 4 words and was asked a question based on object attribute (e g  Which one is the brightest?)  Patient completed this task in 10/12 (83%) opportunities independently, increasing to 12/12 (100%) opportunities given one repetition  To target auditory attention: Patient was verbally presented with a list of 4 words and was asked a question based on object class (e g  Which ones are animals?)  Patient completed this task in 5/10 (50%) opportunities independently, increasing to 10/10 (100%) opportunities given one repetition  Patient noted that he often forgets the first word in the sequence  Because of this, Clinician read the list of 4 words and then repeated the first word  This appeared to help Patient with holding the first word in the list      4  Patient will facilitate planning by completing thought organization tasks (e g , sequencing, deduction puzzles, etc ) with 80% accuracy to facilitate increased executive functioning, working memory, problem solving, and processing skills, to be achieved in 4-6 weeks  5  Patient will complete concrete and abstract categorization tasks to 80% accuracy to facilitate improved generative naming skills and working memory, to be achieved in 4-6 weeks  To target word finding: Patient was presented with analogies with the last blank missing (e g  Happy is glad as sad is to ___)  Patient completed this task in 15/20 (75%) opportunities independently, increasing to 20/20 (100%) opportunities given semantic cues       6  Patient will utilize word finding strategies during semantic feature analysis treatment activity for improved naming and verbal expression skills  Plan:  -Patient was provided with home exercises/activities to target goals in plan of care at the end of today's session   -Continue with current plan of care

## 2021-05-05 ENCOUNTER — OFFICE VISIT (OUTPATIENT)
Dept: VASCULAR SURGERY | Facility: CLINIC | Age: 81
End: 2021-05-05
Payer: MEDICARE

## 2021-05-05 VITALS
BODY MASS INDEX: 27.79 KG/M2 | SYSTOLIC BLOOD PRESSURE: 130 MMHG | WEIGHT: 151 LBS | DIASTOLIC BLOOD PRESSURE: 80 MMHG | HEART RATE: 76 BPM | HEIGHT: 62 IN | TEMPERATURE: 97.4 F

## 2021-05-05 DIAGNOSIS — I70.211 ATHEROSCLEROSIS OF NATIVE ARTERY OF RIGHT LOWER EXTREMITY WITH INTERMITTENT CLAUDICATION (HCC): Primary | ICD-10-CM

## 2021-05-05 DIAGNOSIS — Z95.1 HX OF CABG: ICD-10-CM

## 2021-05-05 DIAGNOSIS — R07.9 CHEST PAIN ON EXERTION: ICD-10-CM

## 2021-05-05 PROCEDURE — 99214 OFFICE O/P EST MOD 30 MIN: CPT | Performed by: SURGERY

## 2021-05-05 RX ORDER — ASPIRIN 325 MG
325 TABLET, DELAYED RELEASE (ENTERIC COATED) ORAL DAILY
Qty: 90 TABLET | Refills: 3 | Status: SHIPPED | OUTPATIENT
Start: 2021-05-05 | End: 2021-06-18 | Stop reason: HOSPADM

## 2021-05-05 NOTE — PROGRESS NOTES
Assessment/Plan:    Atheroscler native arteries the extremities w/intermit claudication [facility])    Patient has undergone previous right SFA intervention several years ago and recently had undergone balloon angioplasty and stenting of the right SFA in July 2020  right SFA stent is patent without any stenosis  There is a known common femoral stenosis that we would like to continue to watch  As long as he has no claudication I do not feel the need to intervene on the common femoral stenosis as this would be best done with a endarterectomy procedure  Chest pain on exertion  With walking  Improves with rest   H/o CABG several years ago  Will refer to cardiologist   Miguel Breaux  Continue aspirin  Increase aspirin to 325 mg per day and stress test has been ordered  Hx of CABG  C/o chest pressure with walking longer distance  Will refer to cardiologist urgently  Has not seen cardiologist is long time  Continue daily aspirin  plan of care is discussed with the patient's daughter on the phone  Diagnoses and all orders for this visit:    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)  -     VAS lower limb arterial duplex, complete bilateral; Future  -     NM myocardial perfusion spect (rx stress and/or rest); Future  -     aspirin (ECOTRIN) 325 mg EC tablet; Take 1 tablet (325 mg total) by mouth daily    Chest pain on exertion  -     Ambulatory referral to Cardiology; Future  -     VAS lower limb arterial duplex, complete bilateral; Future  -     NM myocardial perfusion spect (rx stress and/or rest); Future  -     aspirin (ECOTRIN) 325 mg EC tablet; Take 1 tablet (325 mg total) by mouth daily    Hx of CABG  -     VAS lower limb arterial duplex, complete bilateral; Future  -     NM myocardial perfusion spect (rx stress and/or rest); Future          Subjective:      Patient ID: Tari Temple is a [de-identified] y o  male      Patient presents today to review ALPHONSE s/p R angioplasty and stenting of R SFA on 7/2/20  HPI   patient presents today for review of lower extremity arterial Doppler  He has had prior right SFA angioplasty and stent done in July of 2024 leg claudication  He has some right leg claudication with right calf pain after walking extended period of time but is not affecting his day-to-day lifestyle  He is limited in the amount of 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  Lately he has also been having chest pressure and lower chest discomfort after walking  He has had a history of coronary artery bypass grafting in the past   He has also had previous angioplasty and stent in the past   I have obtained history from the patient but since he has early dementia I have also obtained history by calling his daughter on the phone  The following portions of the patient's history were reviewed and updated as appropriate: allergies, current medications, past family history, past medical history, past social history, past surgical history and problem list     Review of Systems   Constitutional: Negative  HENT: Positive for hearing loss  Eyes: Negative  Respiratory: Negative  Cardiovascular: Negative  Gastrointestinal: Negative  Endocrine: Negative  Genitourinary: Negative  Musculoskeletal:        Leg pain   Skin: Negative  Allergic/Immunologic: Negative  Neurological: Negative  Hematological: Negative  Psychiatric/Behavioral: Positive for confusion and decreased concentration  I have reviewed the review of systems as entered and made appropriate changes as necessary    Objective:      /80 (BP Location: Right arm, Patient Position: Sitting)   Pulse 76   Temp (!) 97 4 °F (36 3 °C) (Tympanic)   Ht 5' 2" (1 575 m)   Wt 68 5 kg (151 lb)   BMI 27 62 kg/m²          Physical Exam  Vitals signs and nursing note reviewed  Constitutional:       Appearance: Normal appearance  He is normal weight     HENT: Head: Normocephalic and atraumatic  Cardiovascular:      Rate and Rhythm: Normal rate and regular rhythm  Heart sounds: Normal heart sounds  No murmur  No friction rub  No gallop  Comments: Triphasic DP and PT on the right lower extremity  Pulmonary:      Effort: Pulmonary effort is normal  No respiratory distress  Breath sounds: Normal breath sounds  No stridor  No wheezing, rhonchi or rales  Chest:      Chest wall: No tenderness  Abdominal:      Palpations: Abdomen is soft  Musculoskeletal: Normal range of motion  Right lower leg: No edema  Left lower leg: No edema  Skin:     General: Skin is warm and dry  Capillary Refill: Capillary refill takes less than 2 seconds  Coloration: Skin is not jaundiced or pale  Findings: No bruising, erythema, lesion or rash  Neurological:      General: No focal deficit present  Mental Status: He is oriented to person, place, and time

## 2021-05-05 NOTE — ASSESSMENT & PLAN NOTE
C/o chest pressure with walking longer distance  Will refer to cardiologist urgently  Has not seen cardiologist is long time  Continue daily aspirin

## 2021-05-05 NOTE — ASSESSMENT & PLAN NOTE
With walking  Improves with rest   H/o CABG several years ago  Will refer to cardiologist   Bg Wiggins  Continue aspirin  Increase aspirin to 325 mg per day and stress test has been ordered

## 2021-05-05 NOTE — PATIENT INSTRUCTIONS
Start taking daily aspirin 325mg  Stop taking the baby aspirin (81mg)  You will be scheduled to see cardiologist for heart evaluation ASAP  You will be scheduled for a stress test to check the heart

## 2021-05-11 ENCOUNTER — APPOINTMENT (OUTPATIENT)
Dept: SPEECH THERAPY | Facility: CLINIC | Age: 81
End: 2021-05-11
Payer: MEDICARE

## 2021-05-18 ENCOUNTER — OFFICE VISIT (OUTPATIENT)
Dept: SPEECH THERAPY | Facility: CLINIC | Age: 81
End: 2021-05-18
Payer: MEDICARE

## 2021-05-18 DIAGNOSIS — F03.90 DEMENTIA WITHOUT BEHAVIORAL DISTURBANCE, UNSPECIFIED DEMENTIA TYPE (HCC): ICD-10-CM

## 2021-05-18 DIAGNOSIS — R48.8 OTHER SYMBOLIC DYSFUNCTIONS: Primary | ICD-10-CM

## 2021-05-18 PROCEDURE — 92507 TX SP LANG VOICE COMM INDIV: CPT

## 2021-05-18 NOTE — PROGRESS NOTES
Daily Speech Treatment Note    Today's date: 2021  Patients name: Judith Leslie  : 1940  MRN: [id number]  Safety measures: Dementia  Referring provider: Alycia Whyte MD    Primary diagnosis/billing code: R 48 8  Secondary diagnosis/billing code: F 03 90    Visit tracking:  -Referring provider: Epic  -Billing guidelines: CMS  -Visit #4/10  -Insurance: Medicare and [address] [facility] [place] due 2021    Subjective/Behavioral:  -"I'm good "    Objective/Assessment:  -Reviewed patient's home exercises/activities completed since last appointment  Patinet completed crossword with 100% accuracy  Patient completed letter fill ins with 100% accuracy  Patient completed abstract category task with 100% accuracy  Short Term Goals  1  Patient will be educated on the use of internal and external memory aids and compensatory strategies with 80% accuracy to facilitate increased recall of routine, personal information, and recent events, to be achieved in 4-6 weeks  2  Patient will complete auditory immediate and short term memory tasks to 80% accuracy to facilitate increased ability to retell narratives and recall information within functional living environment, to be achieved in 4-6 weeks  To target auditory immediate memory: Patient was verbally presented with a 4-5 sentence story and was then asked 5 true/false questions and 5 wh-questions regarding details from the story  Patient completed this task over 1 trial with 9/10 (90%) opportunities independently, increasing to 10/10 (100%) opportunities given one repetition of the story  To target auditory delayed memory: Patient was asked the same questions from above story approx  25 minutes later  Patient answered questions in 6/10 (60%) opportunities independently, increasing to 10/10 (100%) opportunities given multiple choice       3  Patient will complete complex auditory attention processing tasks (e g , sentence unscramble, ranking numbers/words, etc ) to improve working memory with 80% accuracy, to be achieved in 4-6 weeks  To target auditory attention: Patient was verbally presented with a list of 3 words and was instructed to re-order them in alphabetical order  Patient completed this task in 4/10 (40%) opportunities independently, increasing to 10/10 (100%) opportunities given more than one repetition and mod verbal cues  It should be noted that Patient demonstrated strength in alphabetizing, but was only able to recall the first letter of each word  Patient would respond by saying, "Okay, M, Q, and V" instead of "Judi Murdock, Video "     4  Patient will facilitate planning by completing thought organization tasks (e g , sequencing, deduction puzzles, etc ) with 80% accuracy to facilitate increased executive functioning, working memory, problem solving, and processing skills, to be achieved in 4-6 weeks  To target thought organization: Patient was verbally presented with math questions related to time and asked to solve  Patient completed this task in 6/10 (60%) opportunities independently, increasing to 10/10 (100%) opportunities given mod verbal cues  5  Patient will complete concrete and abstract categorization tasks to 80% accuracy to facilitate improved generative naming skills and working memory, to be achieved in 4-6 weeks  6  Patient will utilize word finding strategies during semantic feature analysis treatment activity for improved naming and verbal expression skills  Plan:  -Patient was provided with home exercises/activities to target goals in plan of care at the end of today's session   -Continue with current plan of care

## 2021-05-25 ENCOUNTER — EVALUATION (OUTPATIENT)
Dept: SPEECH THERAPY | Facility: CLINIC | Age: 81
End: 2021-05-25
Payer: MEDICARE

## 2021-05-25 DIAGNOSIS — F03.90 DEMENTIA WITHOUT BEHAVIORAL DISTURBANCE, UNSPECIFIED DEMENTIA TYPE (HCC): ICD-10-CM

## 2021-05-25 DIAGNOSIS — R48.8 OTHER SYMBOLIC DYSFUNCTIONS: Primary | ICD-10-CM

## 2021-05-25 PROCEDURE — 92523 SPEECH SOUND LANG COMPREHEN: CPT

## 2021-05-25 NOTE — PROGRESS NOTES
Speech-Language Pathology Re-Evaluation    Today's date: 2021  Patients name: Zahida Li  : 1940  MRN: [id number]  Safety measures: Dementia  Referring provider: Leonides Mohs, MD    Primary diagnosis/billing code: R 48 8  Secondary diagnosis/billing code: F 03 90    Visit tracking:  -Referring provider: Epic  -Billing guidelines: CMS  -Visit #1/10 (5 Total)  -Insurance: Medicare and [employer]   -RE due 2021    Subjective comments: "I'm alright  I have a lot going on "    Patient's goal(s): "To be more stable "    Assessments    The Brief Cognitive Assessment Test (BCAT) is a multi-domain cognitive tool that assesses a patients orientation, verbal recall, visual recognition, visual recall, attention, abstraction, language, executive functions, and visuospatial processing  BCAT is sensitive to the full spectrum of cognitive functioning (normal, MCI, mild dementia, moderate-severe dementia) and can predict basic and instrumental activities of daily living (ADL, IADL)  During todays administration of the test, patient achieved a total score of 24/50 points  Using the BCAT Crosswalk Table as a reference, patients score falls within the range and may be suggestive of "Moderate to Severe Dementia"  The Crosswalk Table suggests the following:      Cognitive Stage: Moderate to Severe Dementia (BCAT Range: 0-25 // MMSE: 0-18 // GDS: 5-7)  Cognitive & Functional Issues: Moderate (upper end of range) - Pervasive functional deficits (IADLs), but ADLs generally intact; marked deficits in memory and executive functions; behavioral and psychological symptoms are common; requires significant residential support  // Severe (lower end of range) - Needs assistance in ADLs/IADLs; pervasive cognitive deficits; requires complex care  DIAGNOSTIC THERAPY SESSION:    -Esmond Making: Patient completed sequential trail making task (I e  Numbers) with 100% accuracy    Patient completed alternating trail making task (I e  Numbers/Letters) with 88% accuracy  -Telling Time: Patient was read math questions regarding time and was instructed to utilizing wooden clock to help answer  Patient answered math questions in 4/10 (40%) opportunities independently, increasing to 10/10 (100%) opportunities given mod-max verbal cues  Goals    Short Term Goals  1  Patient will be educated on the use of internal and external memory aids and compensatory strategies with 80% accuracy to facilitate increased recall of routine, personal information, and recent events, to be achieved in 4-6 weeks  --MET    2  Patient will complete auditory immediate and short term memory tasks to 80% accuracy to facilitate increased ability to retell narratives and recall information within functional living environment, to be achieved in 4-6 weeks  --PARTIALLY MET    3  Patient will complete complex auditory attention processing tasks (e g , sentence unscramble, ranking numbers/words, etc ) to improve working memory with 80% accuracy, to be achieved in 4-6 weeks  --PARTIALLY MET    4  Patient will facilitate planning by completing thought organization tasks (e g , sequencing, deduction puzzles, etc ) with 80% accuracy to facilitate increased executive functioning, working memory, problem solving, and processing skills, to be achieved in 4-6 weeks  --PARTIALLY MET    5  Patient will complete concrete and abstract categorization tasks to 80% accuracy to facilitate improved generative naming skills and working memory, to be achieved in 4-6 weeks  --PARTIALLY MET    6  Patient will utilize word finding strategies during semantic feature analysis treatment activity for improved naming and verbal expression skills  --PARTIALLY MET    Long Term Goals    1   Patient will demonstrate cognitive-communication skills consistent with age and education given use of compensatory strategies when needed to resume baseline activities and responsibilities in home, community, and work/school settings by discharge  --PARTIALLY MET    2  Patient will complete cognitive-linguistic therapy that addresses patients specific deficits in processing speed, short-term working memory, attention to detail, monitoring, sequencing, and organization skills, with instruction, to alleviate effects of executive functioning disorder deficits by discharge  --PARTIALLY MET    3  Patient will complete higher-level expressive language tasks (e g , word definitions, idioms, synonym/antonyms, etc) with 80% accuracy to improve functional communication skills by discharge  --PARTIALLY MET    Impressions/Recommendations    Impressions:   -Patient presents with moderate cognitive-linguistic deficits characterized by decreased short term memory, attention, and executive functions secondary to Dementia  Patient has demonstrated strengths in organization throughout therapy sessions thus far  Patient keeps all his doctor's information in a file folder with labels  He also keeps his appointments on a calendar  Patient demonstrated weakness today on BCAT testing in delayed memory (words and story), clock drawing, trail making, and generative naming  At this time, it is recommended that Patient participate in cognitive-linguistic therapy 1X a week to increase cognitive-linguistic skills and learn compensatory strategies for memory and attention  Prognosis is good based on familial support and Patient motivation       Recommendations:  -Patient would benefit from outpatient skilled Speech Therapy services : Cognitive-Linguistic therapy    -Frequency: 1x weekly  -Duration: 6-8 weeks    -Intervention certification from: 6/39/5268  -Intervention certification to: 63/68/8674

## 2021-06-01 ENCOUNTER — OFFICE VISIT (OUTPATIENT)
Dept: SPEECH THERAPY | Facility: CLINIC | Age: 81
End: 2021-06-01
Payer: MEDICARE

## 2021-06-01 DIAGNOSIS — F03.90 DEMENTIA WITHOUT BEHAVIORAL DISTURBANCE, UNSPECIFIED DEMENTIA TYPE (HCC): ICD-10-CM

## 2021-06-01 DIAGNOSIS — R48.8 OTHER SYMBOLIC DYSFUNCTIONS: Primary | ICD-10-CM

## 2021-06-01 PROCEDURE — 92507 TX SP LANG VOICE COMM INDIV: CPT

## 2021-06-01 NOTE — PROGRESS NOTES
Daily Speech Treatment Note    Today's date: 2021  Patients name: Hetal Hussein  : 1940  MRN: [id number]  Safety measures: Dementia  Referring provider: Lucia Vanessa MD    Primary diagnosis/billing code: R 48 8  Secondary diagnosis/billing code: F 03 90    Visit tracking:  -Referring provider: Epic  -Billing guidelines: CMS  -Visit #2/10 (6 Total)  -Ins[facility]: Medicare and [facility]    Subjective/Behavioral:  -"Something's different here " (Patient was in a different room than he is used to)     Objective/Assessment:  -Reviewed testing results and goals in plan care with patient  Patient is in agreement at this time  Short Term Goals  2  Patient will complete auditory immediate and short term memory tasks to 80% accuracy to facilitate increased ability to retell narratives and recall information within functional living environment, to be achieved in 4-6 weeks  --PARTIALLY MET    To target immediate memory: Patient was presented with an array of 16 words and was instructed to divide them into four categories  Patient divided words with 100% accuracy  Patient was then given two minutes to study the word lists  The paper was then taken away from the Patient and he was asked to re-write the list from memory  Patient completed this task in 13/16 (81%) opportunities independently, increasing to 14/16 (88%) opportunities given semantic cues  *It should be noted that when Patient was presented with the blank sheet of paper, he stated, "I don't remember a single thing from that list "  Patient was encouraged to just take a moment and think about each category  Patient eventually did recall most items from the lists, but required increased processing time       To target delayed memory: Patient was presented with a blank sheet of paper approx  10 minutes later and was asked to re-write the words from task above    Patient completed this task in 13/16 (81%) opportunities independently, increasing to 15/16 (94%) opportunities given semantic cues  3  Patient will complete complex auditory attention processing tasks (e g , sentence unscramble, ranking numbers/words, etc ) to improve working memory with 80% accuracy, to be achieved in 4-6 weeks  --PARTIALLY MET    To target auditory attention: Patient was verbally presented with a list of 4 words and was instructed to re-order the words in the most logical sequence  Patient completed this task in 8/12 (67%) opportunities independently, increasing to 12/12 (100%) opportunities given more than one repetition  4  Patient will facilitate planning by completing thought organization tasks (e g , sequencing, deduction puzzles, etc ) with 80% accuracy to facilitate increased executive functioning, working memory, problem solving, and processing skills, to be achieved in 4-6 weeks  --PARTIALLY MET    5  Patient will complete concrete and abstract categorization tasks to 80% accuracy to facilitate improved generative naming skills and working memory, to be achieved in 4-6 weeks  --PARTIALLY MET    6  Patient will utilize word finding strategies during semantic feature analysis treatment activity for improved naming and verbal expression skills  --PARTIALLY MET    Plan:  -Patient was provided with home exercises/activities to target goals in plan of care at the end of today's session   -Continue with current plan of care

## 2021-06-04 ENCOUNTER — OFFICE VISIT (OUTPATIENT)
Dept: CARDIOLOGY CLINIC | Facility: MEDICAL CENTER | Age: 81
End: 2021-06-04
Payer: MEDICARE

## 2021-06-04 VITALS
WEIGHT: 149.2 LBS | HEIGHT: 62 IN | BODY MASS INDEX: 27.46 KG/M2 | DIASTOLIC BLOOD PRESSURE: 74 MMHG | SYSTOLIC BLOOD PRESSURE: 126 MMHG | OXYGEN SATURATION: 88 % | HEART RATE: 71 BPM

## 2021-06-04 DIAGNOSIS — I70.211 ATHEROSCLEROSIS OF NATIVE ARTERY OF RIGHT LOWER EXTREMITY WITH INTERMITTENT CLAUDICATION (HCC): ICD-10-CM

## 2021-06-04 DIAGNOSIS — I65.23 ASYMPTOMATIC BILATERAL CAROTID ARTERY STENOSIS: ICD-10-CM

## 2021-06-04 DIAGNOSIS — R07.9 CHEST PAIN ON EXERTION: ICD-10-CM

## 2021-06-04 DIAGNOSIS — I25.118 CORONARY ARTERY DISEASE OF NATIVE ARTERY OF NATIVE HEART WITH STABLE ANGINA PECTORIS (HCC): Primary | ICD-10-CM

## 2021-06-04 PROCEDURE — 93000 ELECTROCARDIOGRAM COMPLETE: CPT | Performed by: INTERNAL MEDICINE

## 2021-06-04 PROCEDURE — 99204 OFFICE O/P NEW MOD 45 MIN: CPT | Performed by: INTERNAL MEDICINE

## 2021-06-04 RX ORDER — ISOSORBIDE MONONITRATE 30 MG/1
30 TABLET, EXTENDED RELEASE ORAL DAILY
Qty: 30 TABLET | Refills: 11 | Status: SHIPPED | OUTPATIENT
Start: 2021-06-04 | End: 2022-06-07

## 2021-06-04 RX ORDER — NITROGLYCERIN 0.4 MG/1
0.4 TABLET SUBLINGUAL
Qty: 25 TABLET | Refills: 11 | Status: SHIPPED | OUTPATIENT
Start: 2021-06-04

## 2021-06-04 NOTE — PATIENT INSTRUCTIONS
Stress nuclear study  Add Imdur 30mg a day  Use sublingual nitro only if needed  Continue current cardiac medications  Continue modification of cardiac risk factors including increase in physical activity, plant  based or Mediterranean style start diet, maintenance of healthy body weight and avoidance of tobacco products  Report any worsening cardiac symptoms (chest pain,shortness of breath or fainting)  Keep follow-up as planned with primary care and other specialists

## 2021-06-04 NOTE — ASSESSMENT & PLAN NOTE
Status post intervention  Now claudication distance is much improved  Continue vascular surgery follow-up  Recent Doppler personally reviewed

## 2021-06-04 NOTE — PROGRESS NOTES
[facility] MIGEL Feldman [address] [place] [id number]  Phone#  [phone]  Fax#  [phone]  [facility] Cardiology Office Consultation             NAME: Landon Zepeda  AGE: [de-identified] y o  SEX: male   : 1940   MRN: [id number]    DATE: 2021  TIME: 12:04 PM    Assessment and plan:    1  Coronary artery disease of native artery of native heart with stable angina pectoris [facility])  Assessment & Plan:   Known coronary artery disease status post remote CABG about 25 years ago  No recent cardiac evaluation  Reports recent CCS class 2 angina  Not limited from this  Further risk stratification planned with pharmacologic nuclear stress testing  If this shows low ischemic burden, treat medically  If high risk findings noted, pursue cardiac catheterization  I had long-acting nitrates for relief of anginal symptoms  P r n  use of sublingual nitrates also discussed  Side effects of headache reviewed  Orders:  -     NM myocardial perfusion spect (stress and/or rest); Future; Expected date: 2021  -     nitroglycerin (NITROSTAT) 0 4 mg SL tablet; Place 1 tablet (0 4 mg total) under the tongue every 5 (five) minutes as needed for chest pain    2  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 [facility])  Assessment & Plan:    Status post recurrent intervention  No significant claudication now  No skin breakdown or ulceration noted  Continue vascular surgery follow-up  Recent leg study personally reviewed  Orders:  -     NM myocardial perfusion spect (rx stress and/or rest)  -     isosorbide mononitrate (IMDUR) 30 mg 24 hr tablet; Take 1 tablet (30 mg total) by mouth daily    3  Chest pain on exertion  -     NM myocardial perfusion spect (rx stress and/or rest)  -     POCT ECG    4  Asymptomatic bilateral carotid artery stenosis  Assessment & Plan:    Recent carotid Doppler reviewed  This showed only mild stenosis    Patient is not on statins due to possibly prior intolerance  He will continue Niaspan and red rice yeast            Discussion:  Pharmacologic stress test  Planned to assess ischemic burden  Medical therapy initiated  Hopefully we can treat him medically if stress test shows low risk findings and symptoms are controlled  Plan of care discussed with the patient's daughter and all questions answered  ADDENDUM:    PATIENT HAD A MARKEDLY ABNORMAL STRESS TEST  CLINICAL ANGINA NOTED AT LOW WORKLOAD  MODERATE AREA OF REVERSIBLE ISCHEMIA WITH BORDERLINE REDUCTION IN EJECTION FRACTION NOTED  CARDIAC CATHETERIZATION RECOMMENDED  -  Stress results: Duration of exercise was 3 min and 42 sec  Target heart rate was achieved  There was resting hypertension with an appropriate blood pressure response to stress  The patient experienced chest pain during stress; pain  resolved spontaneously  -  ECG conclusions: The stress ECG was consistent with myocardial ischemia  -  Perfusion imaging: There was a moderate-sized, mildly severe, partially reversible myocardial perfusion defect of the apical apical and anterior wall  -  Gated SPECT: The calculated left ventricular ejection fraction was 52 %  Left ventricular ejection fraction was within low normal limits by visual estimate  There was mildly reduced myocardial thickening and motion of the apical wall of  the left ventricle  -  Impressions and recommendations: Overall left ventricular systolic function was preserved, but there were regional wall motion abnormalities      IMPRESSIONS: Abnormal study after maximal exercise with reproduction of symptoms  There was a small infarct and a moderate amount of ischemia  Overall left ventricular systolic function was preserved, but there were regional wall motion  abnormalities        Chief Complaint   Patient presents with   [address] [place] Patient Visit     prev  cardio [facility] [place]      Chest Pain     chest discomfort- daughter is asking to see if he needs stress  HPI:  Kalli Krishnamurthy is a [de-identified]y o -year-old male who presents to the cardiology clinic for initial evaluation  He has been referred here for evaluation of exertional angina  Mr Fior Lopez is a very pleasant gentleman who is accompanied here by his daughter who works in healthcare  She is the primary historian  The patient has had significant problems with his memory and at the current time his daughter is the primary caregiver and makes his medical decisions as well  Patient did attempt to provide some history which was corroborated by the daughter  He reports that he has had a fairly active lifestyle at age [de-identified] and he continues to work  When he walks for about 3/4 of a mi, he experiences tightness in his chest   The pain does radiate up to his jaw at times  No radiation down to the arm  He then stops and within a few seconds the pain subsides  This has been fairly predictable  He also reports some shortness of breath when he has the pain  He has no chest pain at rest   No nocturnal angina  He does not recall when last catheterization was done  He reports remote history of CAD status post CABG in 1995  He may have had some stents times subsequently  Those records are not available  His daughter is unable to remember if PCI was performed after his CABG  He does not smoke or chew tobacco   He does not drink alcohol  He has a supportive family  He does not take statins at the current time  He is on a full aspirin without any side effects  His blood pressure historically has been low for beta-blockers  Overall he has done very well with his coronary artery disease up until these recent symptoms  He states he is not very limited by the symptoms but would like to pursue further evaluation    His daughter is keen that we pursue a pharmacologic nuclear stress test to assess ischemic burden and I feel that is very reasonable given his abnormal resting EKG and remote history of bypass surgery  He cannot walk on a treadmill at a fast speed  BP Readings from Last 4 Encounters:  06/04/21 : 126/74  05/05/21 : 130/80  04/02/21 : 128/64  02/05/21 : 162/72     Wt Readings from Last 3 Encounters:  06/04/21 : 67 7 kg (149 lb 3 2 oz)  05/05/21 : 68 5 kg (151 lb)  04/09/21 : 63 5 kg (140 lb)      Lab Results       Component                Value               Date                       LDLCALC                  121 (H)             02/16/2021            Lab Results       Component                Value               Date                       CREATININE               0 82                02/16/2021                       Cardiology Problem list:  Dementia  PVD status post SFA stent  5/20: Carotid Doppler: Mild disease  CAD s/p CABG x3 in 1995    ALLERGIES:  No Known Allergies      Current Outpatient Medications:     aspirin (ECOTRIN) 325 mg EC tablet, Take 1 tablet (325 mg total) by mouth daily, Disp: 90 tablet, Rfl: 3    FOLBIC 2 5-25-2 MG, Take 1 tablet by mouth daily, Disp: , Rfl: 4    niacin (NIASPAN) 1000 MG CR tablet, daily , Disp: , Rfl: 3    Apoaequorin (PREVAGEN PO), Take by mouth, Disp: , Rfl:     isosorbide mononitrate (IMDUR) 30 mg 24 hr tablet, Take 1 tablet (30 mg total) by mouth daily, Disp: 30 tablet, Rfl: 11    nitroglycerin (NITROSTAT) 0 4 mg SL tablet, Place 1 tablet (0 4 mg total) under the tongue every 5 (five) minutes as needed for chest pain, Disp: 25 tablet, Rfl: 11    omega-3-acid ethyl esters (LOVAZA) 1 g capsule, Take 2 g by mouth 2 (two) times a day, Disp: , Rfl:     Red Yeast Rice 600 MG TABS, Take by mouth daily , Disp: , Rfl:       Review of Systems   Constitutional: Negative for activity change, appetite change and unexpected weight change  HENT: Negative for trouble swallowing  Eyes: Negative for visual disturbance  Respiratory: Positive for shortness of breath  Negative for chest tightness and wheezing  Cardiovascular: Positive for chest pain  Negative for palpitations and leg swelling  Gastrointestinal: Negative for blood in stool  Endocrine: Negative for polyuria  Genitourinary: Negative for hematuria  Musculoskeletal: Negative for arthralgias  Skin: Negative for rash  Neurological: Negative for dizziness and weakness  Hematological: Does not bruise/bleed easily  Psychiatric/Behavioral: Positive for decreased concentration  Negative for behavioral problems  Past Medical History:   Diagnosis Date    [facility]ot[facility] artery disease [facility] )     [facility]ication in peripheral vascular disease ([facility] )     High cholesterol        Past Surgical History:   Procedure Laterality Date    BYPASS GRAFT      CAROTID STENT      COLONOSCOPY      CORONARY ARTERY BYPASS GRAFT      IR AORTAGRAM WITH RUN-OFF  2020    WA SLCTV CATHJ 3RD+ ORD SLCTV ABDL PEL/LXTR [facility] Right 2020    Procedure: ARTERIOGRAM, RIGHT LEG ANGIOGRAM, BALLOON ANGIOPLASTY AND STENTING OF RIGHT SFA;  Surgeon: Hallie Lerma MD;  [facility]: [facility];  Service: Vascular    VASCULAR SURGERY         Family History   Problem Relation Age of Onset    Hypertension Family     Heart disease Family     Stroke Family     No Known Problems Mother     No Known Problems Father        Social History     Socioeconomic History    Marital status:      Spouse name: Not on file    Number of children: 3    Years of education: Not on file    Highest education level: Not on file   Occupational History    Not on file   Social Needs    Financial resource strain: Not on file    Food insecurity     Worry: Not on file     Inability: Not on file    Transportation needs     Medical: Not on file     Non-medical: Not on file   Tobacco Use    Smoking status: Former Smoker     Types: Cigarettes     Quit date:      Years since quittin 4    Smokeless tobacco: Never Used   Substance and Sexual Activity    Alcohol use:  Yes Frequency: 2-3 times a week     Drinks per session: 1 or 2     Comment: social     Drug use: Never    Sexual activity: Not Currently   Lifestyle    Physical activity     Days per week: Not on file     Minutes per session: Not on file    Stress: Not on file   Relationships    Social connections     Talks on phone: Not on file     Gets together: Not on file     Attends Orthodox service: Not on file     Active member of club or organization: Not on file     Attends meetings of clubs or organizations: Not on file     Relationship status: Not on file    Intimate partner violence     Fear of current or ex partner: Not on file     Emotionally abused: Not on file     Physically abused: Not on file     Forced sexual activity: Not on file   Other Topics Concern    Not on file   Social History Narrative    Not on file         Objective:     Vitals:    06/04/21 1106   BP: 126/74   Pulse: 71   SpO2: (!) 88%     Wt Readings from Last 3 Encounters:   06/04/21 67 7 kg (149 lb 3 2 oz)   05/05/21 68 5 kg (151 lb)   04/09/21 63 5 kg (140 lb)     Pulse Readings from Last 3 Encounters:   06/04/21 71   05/05/21 76   04/02/21 82     BP Readings from Last 3 Encounters:   06/04/21 126/74   05/05/21 130/80   04/02/21 128/64         Physical Exam  Constitutional:       General: He is not in acute distress  HENT:      Head: Normocephalic  Right Ear: External ear normal    Eyes:      Conjunctiva/sclera: Conjunctivae normal    Neck:      Vascular: No carotid bruit  Cardiovascular:      Rate and Rhythm: Normal rate and regular rhythm  Heart sounds: S1 normal and S2 normal  Murmur present  Systolic murmur present with a grade of 2/6  Comments:   Radial pulses 2+, lower  extremity peripheral pulses are diminished  Pulmonary:      Effort: Pulmonary effort is normal       Breath sounds: Normal breath sounds  Abdominal:      General: Bowel sounds are normal  There is no distension     Musculoskeletal:      Right lower leg: No edema  Left lower leg: No edema  Skin:     General: Skin is warm  Neurological:      Mental Status: Mental status is at baseline  Psychiatric:         Mood and Affect: Mood normal       Comments: Memory mildly impaired  Pertinent Laboratory/Diagnostic Studies:    Laboratory studies reviewed personally by Tremaine Carreon MD    BMP:   Lab Results   Component Value Date    SODIUM 141 02/16/2021    K 4 0 02/16/2021     02/16/2021    CO2 32 02/16/2021    BUN 17 02/16/2021    CREATININE 0 82 02/16/2021    CALCIUM 9 1 02/16/2021     CBC:  Lab Results   Component Value Date    WBC 4 72 02/16/2021    RBC 4 88 02/16/2021    HGB 14 6 02/16/2021    HCT 45 7 02/16/2021    MCV 94 02/16/2021    MCH 29 9 02/16/2021    RDW 13 4 02/16/2021     02/16/2021     Coags:    Lipid Profile:   No results found for: CHOL  Lab Results   Component Value Date    HDL 55 02/16/2021     Lab Results   Component Value Date    LDLCALC 121 (H) 02/16/2021     Lab Results   Component Value Date    TRIG 52 02/16/2021      Lab Results   Component Value Date    ENI7VRDVHHOR 1 570 02/16/2021     Lab Results   Component Value Date    ALT 38 02/16/2021    AST 28 02/16/2021         Imaging Studies:   No results found  Cardiac testing:   EKG reviewed personally: normal sinus rhythm, RAD, IVCD  Orders Placed This Encounter   Procedures    NM myocardial perfusion spect (stress and/or rest)    POCT ECG           Suzette Martínez MD, [facility]    Portions of the record may have been created with voice recognition software  Occasional wrong word or "sound a like" substitutions may have occurred due to the inherent limitations of voice recognition software  Read the chart carefully and recognize, using context, where substitutions have occurred    If you have any questions or concerns about the context, text or information contained within the body of this dictation, please contact myself, the provider, for further clarification

## 2021-06-04 NOTE — ASSESSMENT & PLAN NOTE
Recent carotid Doppler reviewed  This showed only mild stenosis  Patient is not on statins due to possibly prior intolerance    He will continue Niaspan and red rice yeast

## 2021-06-04 NOTE — ASSESSMENT & PLAN NOTE
Known coronary artery disease status post remote CABG about 25 years ago  No recent cardiac evaluation  Reports recent CCS class 2 angina  Not limited from this  Further risk stratification planned with pharmacologic nuclear stress testing  If this shows low ischemic burden, treat medically  If high risk findings noted, pursue cardiac catheterization  I had long-acting nitrates for relief of anginal symptoms  P r n  use of sublingual nitrates also discussed  Side effects of headache reviewed

## 2021-06-04 NOTE — H&P (VIEW-ONLY)
[facility] [address]IG[address] [address]  Phone#  [phone]  Fax#  [phone]  [facility] Cardiology Office Consultation             NAME: Haydee Vaughan  AGE: [de-identified] y o  SEX: male   : 1940   MRN: [id number]    DATE: 2021  TIME: 12:04 PM    Assessment and plan:    1  Coronary artery disease of native artery of native heart with stable angina pectoris [facility])  Assessment & Plan:   Known coronary artery disease status post remote CABG about 25 years ago  No recent cardiac evaluation  Reports recent CCS class 2 angina  Not limited from this  Further risk stratification planned with pharmacologic nuclear stress testing  If this shows low ischemic burden, treat medically  If high risk findings noted, pursue cardiac catheterization  I had long-acting nitrates for relief of anginal symptoms  P r n  use of sublingual nitrates also discussed  Side effects of headache reviewed  Orders:  -     NM myocardial perfusion spect (stress and/or rest); Future; Expected date: 2021  -     nitroglycerin (NITROSTAT) 0 4 mg SL tablet; Place 1 tablet (0 4 mg total) under the tongue every 5 (five) minutes as needed for chest pain    2  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 [facility])  Assessment & Plan:    Status post recurrent intervention  No significant claudication now  No skin breakdown or ulceration noted  Continue vascular surgery follow-up  Recent leg study personally reviewed  Orders:  -     NM myocardial perfusion spect (rx stress and/or rest)  -     isosorbide mononitrate (IMDUR) 30 mg 24 hr tablet; Take 1 tablet (30 mg total) by mouth daily    3  Chest pain on exertion  -     NM myocardial perfusion spect (rx stress and/or rest)  -     POCT ECG    4  Asymptomatic bilateral carotid artery stenosis  Assessment & Plan:    Recent carotid Doppler reviewed  This showed only mild stenosis    Patient is not on statins due to possibly prior intolerance  He will continue Niaspan and red rice yeast            Discussion:  Pharmacologic stress test  Planned to assess ischemic burden  Medical therapy initiated  Hopefully we can treat him medically if stress test shows low risk findings and symptoms are controlled  Plan of care discussed with the patient's daughter and all questions answered  ADDENDUM:    PATIENT HAD A MARKEDLY ABNORMAL STRESS TEST  CLINICAL ANGINA NOTED AT LOW WORKLOAD  MODERATE AREA OF REVERSIBLE ISCHEMIA WITH BORDERLINE REDUCTION IN EJECTION FRACTION NOTED  CARDIAC CATHETERIZATION RECOMMENDED  -  Stress results: Duration of exercise was 3 min and 42 sec  Target heart rate was achieved  There was resting hypertension with an appropriate blood pressure response to stress  The patient experienced chest pain during stress; pain  resolved spontaneously  -  ECG conclusions: The stress ECG was consistent with myocardial ischemia  -  Perfusion imaging: There was a moderate-sized, mildly severe, partially reversible myocardial perfusion defect of the apical apical and anterior wall  -  Gated SPECT: The calculated left ventricular ejection fraction was 52 %  Left ventricular ejection fraction was within low normal limits by visual estimate  There was mildly reduced myocardial thickening and motion of the apical wall of  the left ventricle  -  Impressions and recommendations: Overall left ventricular systolic function was preserved, but there were regional wall motion abnormalities      IMPRESSIONS: Abnormal study after maximal exercise with reproduction of symptoms  There was a small infarct and a moderate amount of ischemia  Overall left ventricular systolic function was preserved, but there were regional wall motion  abnormalities        Chief Complaint   Patient presents with   [address] [place] Patient Visit     prev  cardio [facility]      Chest Pain     chest discomfort- daughter is asking to see if he needs stress  HPI:  Dayron Hannon is a [de-identified]y o -year-old male who presents to the cardiology clinic for initial evaluation  He has been referred here for evaluation of exertional angina  Mr Nicole Orozco is a very pleasant gentleman who is accompanied here by his daughter who works in healthcare  She is the primary historian  The patient has had significant problems with his memory and at the current time his daughter is the primary caregiver and makes his medical decisions as well  Patient did attempt to provide some history which was corroborated by the daughter  He reports that he has had a fairly active lifestyle at age [de-identified] and he continues to work  When he walks for about 3/4 of a mi, he experiences tightness in his chest   The pain does radiate up to his jaw at times  No radiation down to the arm  He then stops and within a few seconds the pain subsides  This has been fairly predictable  He also reports some shortness of breath when he has the pain  He has no chest pain at rest   No nocturnal angina  He does not recall when last catheterization was done  He reports remote history of CAD status post CABG in 1995  He may have had some stents times subsequently  Those records are not available  His daughter is unable to remember if PCI was performed after his CABG  He does not smoke or chew tobacco   He does not drink alcohol  He has a supportive family  He does not take statins at the current time  He is on a full aspirin without any side effects  His blood pressure historically has been low for beta-blockers  Overall he has done very well with his coronary artery disease up until these recent symptoms  He states he is not very limited by the symptoms but would like to pursue further evaluation    His daughter is keen that we pursue a pharmacologic nuclear stress test to assess ischemic burden and I feel that is very reasonable given his abnormal resting EKG and remote history of bypass surgery  He cannot walk on a treadmill at a fast speed  BP Readings from Last 4 Encounters:  06/04/21 : 126/74  05/05/21 : 130/80  04/02/21 : 128/64  02/05/21 : 162/72     Wt Readings from Last 3 Encounters:  06/04/21 : 67 7 kg (149 lb 3 2 oz)  05/05/21 : 68 5 kg (151 lb)  04/09/21 : 63 5 kg (140 lb)      Lab Results       Component                Value               Date                       LDLCALC                  121 (H)             02/16/2021            Lab Results       Component                Value               Date                       CREATININE               0 82                02/16/2021                       Cardiology Problem list:  Dementia  PVD status post SFA stent  5/20: Carotid Doppler: Mild disease  CAD s/p CABG x3 in 1995    ALLERGIES:  No Known Allergies      Current Outpatient Medications:     aspirin (ECOTRIN) 325 mg EC tablet, Take 1 tablet (325 mg total) by mouth daily, Disp: 90 tablet, Rfl: 3    FOLBIC 2 5-25-2 MG, Take 1 tablet by mouth daily, Disp: , Rfl: 4    niacin (NIASPAN) 1000 MG CR tablet, daily , Disp: , Rfl: 3    Apoaequorin (PREVAGEN PO), Take by mouth, Disp: , Rfl:     isosorbide mononitrate (IMDUR) 30 mg 24 hr tablet, Take 1 tablet (30 mg total) by mouth daily, Disp: 30 tablet, Rfl: 11    nitroglycerin (NITROSTAT) 0 4 mg SL tablet, Place 1 tablet (0 4 mg total) under the tongue every 5 (five) minutes as needed for chest pain, Disp: 25 tablet, Rfl: 11    omega-3-acid ethyl esters (LOVAZA) 1 g capsule, Take 2 g by mouth 2 (two) times a day, Disp: , Rfl:     Red Yeast Rice 600 MG TABS, Take by mouth daily , Disp: , Rfl:       Review of Systems   Constitutional: Negative for activity change, appetite change and unexpected weight change  HENT: Negative for trouble swallowing  Eyes: Negative for visual disturbance  Respiratory: Positive for shortness of breath  Negative for chest tightness and wheezing  Cardiovascular: Positive for chest pain  Negative for palpitations and leg swelling  Gastrointestinal: Negative for blood in stool  Endocrine: Negative for polyuria  Genitourinary: Negative for hematuria  Musculoskeletal: Negative for arthralgias  Skin: Negative for rash  Neurological: Negative for dizziness and weakness  Hematological: Does not bruise/bleed easily  Psychiatric/Behavioral: Positive for decreased concentration  Negative for behavioral problems  Past Medical History:   Diagnosis Date    [facility]ot[facility] artery disease ([facility] [address] )     [facility] in peripheral vascular disease ([facility] )     High cholesterol        Past Surgical History:   Procedure Laterality Date    BYPASS GRAFT      CAROTID STENT      COLONOSCOPY      CORONARY ARTERY BYPASS GRAFT      IR AORTAGRAM WITH RUN-OFF  2020    MN SLCTV CATHJ 3RD+ ORD SLCTV ABDL PEL/LXTR [facility] Right 2020    Procedure: ARTERIOGRAM, RIGHT LEG ANGIOGRAM, BALLOON ANGIOPLASTY AND STENTING OF RIGHT SFA;  Surgeon: Salbador Krueger MD;  Location: [facility];  Service: Vascular    VASCULAR SURGERY         Family History   Problem Relation Age of Onset    Hypertension Family     Heart disease Family     Stroke Family     No Known Problems Mother     No Known Problems Father        Social History     Socioeconomic History    Marital status:      Spouse name: Not on file    Number of children: 3    Years of education: Not on file    Highest education level: Not on file   Occupational History    Not on file   Social Needs    Financial resource strain: Not on file    Food insecurity     Worry: Not on file     Inability: Not on file    Transportation needs     Medical: Not on file     Non-medical: Not on file   Tobacco Use    Smoking status: Former Smoker     Types: Cigarettes     Quit date:      Years since quittin 4    Smokeless tobacco: Never Used   Substance and Sexual Activity    Alcohol use:  Yes Frequency: 2-3 times a week     Drinks per session: 1 or 2     Comment: social     Drug use: Never    Sexual activity: Not Currently   Lifestyle    Physical activity     Days per week: Not on file     Minutes per session: Not on file    Stress: Not on file   Relationships    Social connections     Talks on phone: Not on file     Gets together: Not on file     Attends Mandaeism service: Not on file     Active member of club or organization: Not on file     Attends meetings of clubs or organizations: Not on file     Relationship status: Not on file    Intimate partner violence     Fear of current or ex partner: Not on file     Emotionally abused: Not on file     Physically abused: Not on file     Forced sexual activity: Not on file   Other Topics Concern    Not on file   Social History Narrative    Not on file         Objective:     Vitals:    06/04/21 1106   BP: 126/74   Pulse: 71   SpO2: (!) 88%     Wt Readings from Last 3 Encounters:   06/04/21 67 7 kg (149 lb 3 2 oz)   05/05/21 68 5 kg (151 lb)   04/09/21 63 5 kg (140 lb)     Pulse Readings from Last 3 Encounters:   06/04/21 71   05/05/21 76   04/02/21 82     BP Readings from Last 3 Encounters:   06/04/21 126/74   05/05/21 130/80   04/02/21 128/64         Physical Exam  Constitutional:       General: He is not in acute distress  HENT:      Head: Normocephalic  Right Ear: External ear normal    Eyes:      Conjunctiva/sclera: Conjunctivae normal    Neck:      Vascular: No carotid bruit  Cardiovascular:      Rate and Rhythm: Normal rate and regular rhythm  Heart sounds: S1 normal and S2 normal  Murmur present  Systolic murmur present with a grade of 2/6  Comments:   Radial pulses 2+, lower  extremity peripheral pulses are diminished  Pulmonary:      Effort: Pulmonary effort is normal       Breath sounds: Normal breath sounds  Abdominal:      General: Bowel sounds are normal  There is no distension     Musculoskeletal:      Right lower leg: No edema  Left lower leg: No edema  Skin:     General: Skin is warm  Neurological:      Mental Status: Mental status is at baseline  Psychiatric:         Mood and Affect: Mood normal       Comments: Memory mildly impaired  Pertinent Laboratory/Diagnostic Studies:    Laboratory studies reviewed personally by Celestia Bosworth, MD    BMP:   Lab Results   Component Value Date    SODIUM 141 02/16/2021    K 4 0 02/16/2021     02/16/2021    CO2 32 02/16/2021    BUN 17 02/16/2021    CREATININE 0 82 02/16/2021    CALCIUM 9 1 02/16/2021     CBC:  Lab Results   Component Value Date    WBC 4 72 02/16/2021    RBC 4 88 02/16/2021    HGB 14 6 02/16/2021    HCT 45 7 02/16/2021    MCV 94 02/16/2021    MCH 29 9 02/16/2021    RDW 13 4 02/16/2021     02/16/2021     Coags:    Lipid Profile:   No results found for: CHOL  Lab Results   Component Value Date    HDL 55 02/16/2021     Lab Results   Component Value Date    LDLCALC 121 (H) 02/16/2021     Lab Results   Component Value Date    TRIG 52 02/16/2021      Lab Results   Component Value Date    LHL0ATATVCQH 1 570 02/16/2021     Lab Results   Component Value Date    ALT 38 02/16/2021    AST 28 02/16/2021         Imaging Studies:   No results found  Cardiac testing:   EKG reviewed personally: normal sinus rhythm, RAD, IVCD  Orders Placed This Encounter   Procedures    NM myocardial perfusion spect (stress and/or rest)    POCT ECG           Hamzah Weinstein MD, [facility]    Portions of the record may have been created with voice recognition software  Occasional wrong word or "sound a like" substitutions may have occurred due to the inherent limitations of voice recognition software  Read the chart carefully and recognize, using context, where substitutions have occurred    If you have any questions or concerns about the context, text or information contained within the body of this dictation, please contact myself, the provider, for further clarification

## 2021-06-04 NOTE — ASSESSMENT & PLAN NOTE
Status post recurrent intervention  No significant claudication now  No skin breakdown or ulceration noted  Continue vascular surgery follow-up  Recent leg study personally reviewed

## 2021-06-08 ENCOUNTER — APPOINTMENT (OUTPATIENT)
Dept: SPEECH THERAPY | Facility: CLINIC | Age: 81
End: 2021-06-08
Payer: MEDICARE

## 2021-06-10 ENCOUNTER — HOSPITAL ENCOUNTER (OUTPATIENT)
Dept: RADIOLOGY | Facility: HOSPITAL | Age: 81
Discharge: HOME | End: 2021-06-10
Payer: MEDICARE

## 2021-06-10 ENCOUNTER — HOSPITAL ENCOUNTER (OUTPATIENT)
Dept: NON INVASIVE DIAGNOSTICS | Facility: HOSPITAL | Age: 81
Discharge: HOME | End: 2021-06-10
Payer: MEDICARE

## 2021-06-10 DIAGNOSIS — I25.118 CORONARY ARTERY DISEASE OF NATIVE ARTERY OF NATIVE HEART WITH STABLE ANGINA PECTORIS (HCC): ICD-10-CM

## 2021-06-10 LAB
CHEST PAIN STATEMENT: NORMAL
MAX DIASTOLIC BP: 82 MMHG
MAX HEART RATE: 131 BPM
MAX PREDICTED HEART RATE: 140 BPM
MAX. SYSTOLIC BP: 160 MMHG
PROTOCOL NAME: NORMAL
TARGET HR FORMULA: NORMAL
TEST INDICATION: NORMAL
TIME IN EXERCISE PHASE: NORMAL

## 2021-06-10 PROCEDURE — 78452 HT MUSCLE IMAGE SPECT MULT: CPT

## 2021-06-10 PROCEDURE — 93017 CV STRESS TEST TRACING ONLY: CPT

## 2021-06-10 PROCEDURE — G1004 CDSM NDSC: HCPCS

## 2021-06-10 PROCEDURE — A9502 TC99M TETROFOSMIN: HCPCS

## 2021-06-11 PROCEDURE — 78452 HT MUSCLE IMAGE SPECT MULT: CPT | Performed by: INTERNAL MEDICINE

## 2021-06-11 PROCEDURE — 93018 CV STRESS TEST I&R ONLY: CPT | Performed by: INTERNAL MEDICINE

## 2021-06-11 PROCEDURE — 93016 CV STRESS TEST SUPVJ ONLY: CPT | Performed by: INTERNAL MEDICINE

## 2021-06-14 ENCOUNTER — TELEPHONE (OUTPATIENT)
Dept: CARDIOLOGY CLINIC | Facility: CLINIC | Age: 81
End: 2021-06-14

## 2021-06-14 ENCOUNTER — TELEPHONE (OUTPATIENT)
Dept: CARDIOLOGY CLINIC | Facility: MEDICAL CENTER | Age: 81
End: 2021-06-14

## 2021-06-14 DIAGNOSIS — I25.118 CORONARY ARTERY DISEASE OF NATIVE ARTERY OF NATIVE HEART WITH STABLE ANGINA PECTORIS (HCC): Primary | ICD-10-CM

## 2021-06-14 NOTE — TELEPHONE ENCOUNTER
Spoke with pt's daughter Tony Cordero and she scheduled him on 06/17 for a cardiac cath    ----- Message from Lanie Wells MD sent at 6/13/2021  8:38 PM EDT -----  NUCLEAR STRESS TEST REVIEWED:  This test is abnormal   This is suggestive of reduced blood flow to some areas of the heart which may be resulting from progression of underlying blockages in the coronary arteries  I would recommend intensification of medical treatment  Schedule cardiac catheterization ASAP to further assess the severity of heart artery blockages

## 2021-06-14 NOTE — RESULT ENCOUNTER NOTE
Spoke with daughter Jake Prior regarding pt's results  She scheduled pt for a cardiac cath on 06/17

## 2021-06-14 NOTE — TELEPHONE ENCOUNTER
Patient scheduled for LHC on 6/17/21 in [facility] with Dr Leila Miller  Patients daughter aware of all general instructions  Patient has Medicare as primary insurance

## 2021-06-14 NOTE — RESULT ENCOUNTER NOTE
NUCLEAR STRESS TEST REVIEWED:  This test is abnormal   This is suggestive of reduced blood flow to some areas of the heart which may be resulting from progression of underlying blockages in the coronary arteries  I would recommend intensification of medical treatment  Schedule cardiac catheterization ASAP to further assess the severity of heart artery blockages

## 2021-06-15 ENCOUNTER — OFFICE VISIT (OUTPATIENT)
Dept: SPEECH THERAPY | Facility: CLINIC | Age: 81
End: 2021-06-15
Payer: MEDICARE

## 2021-06-15 ENCOUNTER — TRANSCRIBE ORDERS (OUTPATIENT)
Dept: LAB | Facility: CLINIC | Age: 81
End: 2021-06-15

## 2021-06-15 ENCOUNTER — APPOINTMENT (OUTPATIENT)
Dept: LAB | Facility: CLINIC | Age: 81
End: 2021-06-15
Payer: MEDICARE

## 2021-06-15 DIAGNOSIS — R48.8 OTHER SYMBOLIC DYSFUNCTIONS: Primary | ICD-10-CM

## 2021-06-15 DIAGNOSIS — F03.90 DEMENTIA WITHOUT BEHAVIORAL DISTURBANCE, UNSPECIFIED DEMENTIA TYPE (HCC): ICD-10-CM

## 2021-06-15 LAB
ALBUMIN SERPL BCP-MCNC: 3.5 G/DL (ref 3.5–5)
ALP SERPL-CCNC: 105 U/L (ref 46–116)
ALT SERPL W P-5'-P-CCNC: 36 U/L (ref 12–78)
ANION GAP SERPL CALCULATED.3IONS-SCNC: 5 MMOL/L (ref 4–13)
AST SERPL W P-5'-P-CCNC: 24 U/L (ref 5–45)
BASOPHILS # BLD AUTO: 0.01 THOUSANDS/ΜL (ref 0–0.1)
BASOPHILS NFR BLD AUTO: 0 % (ref 0–1)
BILIRUB SERPL-MCNC: 0.63 MG/DL (ref 0.2–1)
BUN SERPL-MCNC: 10 MG/DL (ref 5–25)
CALCIUM SERPL-MCNC: 9.6 MG/DL (ref 8.3–10.1)
CHLORIDE SERPL-SCNC: 106 MMOL/L (ref 100–108)
CO2 SERPL-SCNC: 28 MMOL/L (ref 21–32)
CREAT SERPL-MCNC: 0.82 MG/DL (ref 0.6–1.3)
EOSINOPHIL # BLD AUTO: 0.16 THOUSAND/ΜL (ref 0–0.61)
EOSINOPHIL NFR BLD AUTO: 4 % (ref 0–6)
ERYTHROCYTE [DISTWIDTH] IN BLOOD BY AUTOMATED COUNT: 13.9 % (ref 11.6–15.1)
GFR SERPL CREATININE-BSD FRML MDRD: 84 ML/MIN/1.73SQ M
GLUCOSE SERPL-MCNC: 111 MG/DL (ref 65–140)
HCT VFR BLD AUTO: 43.8 % (ref 36.5–49.3)
HGB BLD-MCNC: 14.3 G/DL (ref 12–17)
IMM GRANULOCYTES # BLD AUTO: 0 THOUSAND/UL (ref 0–0.2)
IMM GRANULOCYTES NFR BLD AUTO: 0 % (ref 0–2)
LYMPHOCYTES # BLD AUTO: 1.45 THOUSANDS/ΜL (ref 0.6–4.47)
LYMPHOCYTES NFR BLD AUTO: 32 % (ref 14–44)
MCH RBC QN AUTO: 30.5 PG (ref 26.8–34.3)
MCHC RBC AUTO-ENTMCNC: 32.6 G/DL (ref 31.4–37.4)
MCV RBC AUTO: 93 FL (ref 82–98)
MONOCYTES # BLD AUTO: 0.35 THOUSAND/ΜL (ref 0.17–1.22)
MONOCYTES NFR BLD AUTO: 8 % (ref 4–12)
NEUTROPHILS # BLD AUTO: 2.53 THOUSANDS/ΜL (ref 1.85–7.62)
NEUTS SEG NFR BLD AUTO: 56 % (ref 43–75)
NRBC BLD AUTO-RTO: 0 /100 WBCS
PLATELET # BLD AUTO: 176 THOUSANDS/UL (ref 149–390)
PMV BLD AUTO: 10.5 FL (ref 8.9–12.7)
POTASSIUM SERPL-SCNC: 3.9 MMOL/L (ref 3.5–5.3)
PROT SERPL-MCNC: 8.1 G/DL (ref 6.4–8.2)
RBC # BLD AUTO: 4.69 MILLION/UL (ref 3.88–5.62)
SODIUM SERPL-SCNC: 139 MMOL/L (ref 136–145)
WBC # BLD AUTO: 4.5 THOUSAND/UL (ref 4.31–10.16)

## 2021-06-15 PROCEDURE — 85025 COMPLETE CBC W/AUTO DIFF WBC: CPT

## 2021-06-15 PROCEDURE — 92507 TX SP LANG VOICE COMM INDIV: CPT

## 2021-06-15 PROCEDURE — 36415 COLL VENOUS BLD VENIPUNCTURE: CPT

## 2021-06-15 PROCEDURE — 80053 COMPREHEN METABOLIC PANEL: CPT

## 2021-06-15 NOTE — PROGRESS NOTES
Daily Speech Treatment Note    Today's date: 6/15/2021  Patients name: Alee Sandhu  : 1940  MRN: [id number]  Safety measures: Dementia  Referring provider: Redd Crow MD    Primary diagnosis/billing code: R 48 8  Secondary diagnosis/billing code: F 03 90    Visit tracking:  -Referring provider: Epic  -Billing guidelines: CMS  -Visit #3/10 (7 Total)  -[facility]    Subjective/Behavioral:  -"I'm gonna be over there at that hospital for a test "    Objective/Assessment:  -Reviewed patient's home exercises/activities completed since last appointment  Patient did not complete homework since previous session  Short Term Goals  2  Patient will complete auditory immediate and short term memory tasks to 80% accuracy to facilitate increased ability to retell narratives and recall information within functional living environment, to be achieved in 4-6 weeks  --PARTIALLY MET    3  Patient will complete complex auditory attention processing tasks (e g , sentence unscramble, ranking numbers/words, etc ) to improve working memory with 80% accuracy, to be achieved in 4-6 weeks  --PARTIALLY MET    To target auditory attention: Patient was verbally presented with a list of 4 words and was instructed to state which word is the smallest   Patient completed this task in 13/15 (87%) opportunities independently, increasing to 15/15 (100%) opportunities given one repetition  4  Patient will facilitate planning by completing thought organization tasks (e g , sequencing, deduction puzzles, etc ) with 80% accuracy to facilitate increased executive functioning, working memory, problem solving, and processing skills, to be achieved in 4-6 weeks  --PARTIALLY MET    To target thought organization: Patient was presented with word scrambles and was instructed to re-order letters to make words that fit into a specific category    Patient completed this task in 26/30 (87%) opportunities independently, increasing to 30/30 (100%) opportunities given semantic cues  5  Patient will complete concrete and abstract categorization tasks to 80% accuracy to facilitate improved generative naming skills and working memory, to be achieved in 4-6 weeks  --PARTIALLY MET    To target word finding and categorization: Patient completed 4X4 category matrices where he was instructed to state words that match both a category and a letter  Patient completed this task in 8/12 (67%) opportunities independently, increasing to 12/12 (100%) opportunities given semantic cues  6  Patient will utilize word finding strategies during semantic feature analysis treatment activity for improved naming and verbal expression skills  --PARTIALLY MET    Plan:  -Patient was provided with home exercises/activities to target goals in plan of care at the end of today's session   -Continue with current plan of care

## 2021-06-16 PROBLEM — R94.39 ABNORMAL NUCLEAR STRESS TEST: Status: ACTIVE | Noted: 2021-06-16

## 2021-06-17 ENCOUNTER — HOSPITAL ENCOUNTER (OUTPATIENT)
Dept: NON INVASIVE DIAGNOSTICS | Facility: HOSPITAL | Age: 81
Discharge: HOME | End: 2021-06-18
Attending: INTERNAL MEDICINE | Admitting: INTERNAL MEDICINE
Payer: MEDICARE

## 2021-06-17 DIAGNOSIS — R07.9 CHEST PAIN ON EXERTION: ICD-10-CM

## 2021-06-17 DIAGNOSIS — I25.10 CAD S/P PERCUTANEOUS CORONARY ANGIOPLASTY: Primary | ICD-10-CM

## 2021-06-17 DIAGNOSIS — I25.118 CORONARY ARTERY DISEASE OF NATIVE ARTERY OF NATIVE HEART WITH STABLE ANGINA PECTORIS (HCC): ICD-10-CM

## 2021-06-17 DIAGNOSIS — Z98.61 CAD S/P PERCUTANEOUS CORONARY ANGIOPLASTY: Primary | ICD-10-CM

## 2021-06-17 LAB
ATRIAL RATE: 71 BPM
ATRIAL RATE: 75 BPM
KCT BLD-ACNC: 261 SEC (ref 89–137)
KCT BLD-ACNC: 305 SEC (ref 89–137)
P AXIS: 50 DEGREES
P AXIS: 62 DEGREES
PR INTERVAL: 170 MS
PR INTERVAL: 178 MS
QRS AXIS: 10 DEGREES
QRS AXIS: 39 DEGREES
QRSD INTERVAL: 132 MS
QRSD INTERVAL: 134 MS
QT INTERVAL: 422 MS
QT INTERVAL: 430 MS
QTC INTERVAL: 467 MS
QTC INTERVAL: 471 MS
SPECIMEN SOURCE: ABNORMAL
SPECIMEN SOURCE: ABNORMAL
T WAVE AXIS: 21 DEGREES
T WAVE AXIS: 24 DEGREES
VENTRICULAR RATE: 71 BPM
VENTRICULAR RATE: 75 BPM

## 2021-06-17 PROCEDURE — 93455 CORONARY ART/GRFT ANGIO S&I: CPT | Performed by: INTERNAL MEDICINE

## 2021-06-17 PROCEDURE — C1894 INTRO/SHEATH, NON-LASER: HCPCS | Performed by: INTERNAL MEDICINE

## 2021-06-17 PROCEDURE — C1753 CATH, INTRAVAS ULTRASOUND: HCPCS | Performed by: INTERNAL MEDICINE

## 2021-06-17 PROCEDURE — 93005 ELECTROCARDIOGRAM TRACING: CPT

## 2021-06-17 PROCEDURE — C1769 GUIDE WIRE: HCPCS | Performed by: INTERNAL MEDICINE

## 2021-06-17 PROCEDURE — 99152 MOD SED SAME PHYS/QHP 5/>YRS: CPT | Performed by: INTERNAL MEDICINE

## 2021-06-17 PROCEDURE — C1725 CATH, TRANSLUMIN NON-LASER: HCPCS | Performed by: INTERNAL MEDICINE

## 2021-06-17 PROCEDURE — C1874 STENT, COATED/COV W/DEL SYS: HCPCS

## 2021-06-17 PROCEDURE — 85347 COAGULATION TIME ACTIVATED: CPT

## 2021-06-17 PROCEDURE — 92978 ENDOLUMINL IVUS OCT C 1ST: CPT | Performed by: INTERNAL MEDICINE

## 2021-06-17 PROCEDURE — 93799 UNLISTED CV SVC/PROCEDURE: CPT | Performed by: INTERNAL MEDICINE

## 2021-06-17 PROCEDURE — 93010 ELECTROCARDIOGRAM REPORT: CPT | Performed by: INTERNAL MEDICINE

## 2021-06-17 PROCEDURE — C1887 CATHETER, GUIDING: HCPCS | Performed by: INTERNAL MEDICINE

## 2021-06-17 PROCEDURE — 99153 MOD SED SAME PHYS/QHP EA: CPT | Performed by: INTERNAL MEDICINE

## 2021-06-17 PROCEDURE — C9600 PERC DRUG-EL COR STENT SING: HCPCS | Performed by: INTERNAL MEDICINE

## 2021-06-17 PROCEDURE — 92928 PRQ TCAT PLMT NTRAC ST 1 LES: CPT | Performed by: INTERNAL MEDICINE

## 2021-06-17 PROCEDURE — C1760 CLOSURE DEV, VASC: HCPCS | Performed by: INTERNAL MEDICINE

## 2021-06-17 RX ORDER — FENTANYL CITRATE 50 UG/ML
INJECTION, SOLUTION INTRAMUSCULAR; INTRAVENOUS CODE/TRAUMA/SEDATION MEDICATION
Status: COMPLETED | OUTPATIENT
Start: 2021-06-17 | End: 2021-06-17

## 2021-06-17 RX ORDER — SODIUM CHLORIDE 9 MG/ML
125 INJECTION, SOLUTION INTRAVENOUS CONTINUOUS
Status: DISPENSED | OUTPATIENT
Start: 2021-06-17 | End: 2021-06-17

## 2021-06-17 RX ORDER — CLOPIDOGREL BISULFATE 75 MG/1
75 TABLET ORAL DAILY
Status: DISCONTINUED | OUTPATIENT
Start: 2021-06-18 | End: 2021-06-18 | Stop reason: HOSPADM

## 2021-06-17 RX ORDER — CARVEDILOL 12.5 MG/1
12.5 TABLET ORAL EVERY 12 HOURS SCHEDULED
Status: DISCONTINUED | OUTPATIENT
Start: 2021-06-17 | End: 2021-06-17

## 2021-06-17 RX ORDER — CARVEDILOL 6.25 MG/1
6.25 TABLET ORAL 2 TIMES DAILY WITH MEALS
Status: DISCONTINUED | OUTPATIENT
Start: 2021-06-17 | End: 2021-06-17

## 2021-06-17 RX ORDER — ONDANSETRON 2 MG/ML
4 INJECTION INTRAMUSCULAR; INTRAVENOUS EVERY 6 HOURS PRN
Status: DISCONTINUED | OUTPATIENT
Start: 2021-06-17 | End: 2021-06-18 | Stop reason: HOSPADM

## 2021-06-17 RX ORDER — ISOSORBIDE MONONITRATE 30 MG/1
30 TABLET, EXTENDED RELEASE ORAL DAILY
Status: DISCONTINUED | OUTPATIENT
Start: 2021-06-17 | End: 2021-06-18 | Stop reason: HOSPADM

## 2021-06-17 RX ORDER — CLOPIDOGREL BISULFATE 75 MG/1
600 TABLET ORAL ONCE
Status: COMPLETED | OUTPATIENT
Start: 2021-06-17 | End: 2021-06-17

## 2021-06-17 RX ORDER — CARVEDILOL 6.25 MG/1
6.25 TABLET ORAL EVERY 12 HOURS SCHEDULED
Status: DISCONTINUED | OUTPATIENT
Start: 2021-06-18 | End: 2021-06-18 | Stop reason: HOSPADM

## 2021-06-17 RX ORDER — MIDAZOLAM HYDROCHLORIDE 2 MG/2ML
INJECTION, SOLUTION INTRAMUSCULAR; INTRAVENOUS CODE/TRAUMA/SEDATION MEDICATION
Status: COMPLETED | OUTPATIENT
Start: 2021-06-17 | End: 2021-06-17

## 2021-06-17 RX ORDER — LIDOCAINE HYDROCHLORIDE 10 MG/ML
INJECTION, SOLUTION EPIDURAL; INFILTRATION; INTRACAUDAL; PERINEURAL CODE/TRAUMA/SEDATION MEDICATION
Status: COMPLETED | OUTPATIENT
Start: 2021-06-17 | End: 2021-06-17

## 2021-06-17 RX ORDER — ASPIRIN 81 MG/1
81 TABLET, CHEWABLE ORAL DAILY
Status: DISCONTINUED | OUTPATIENT
Start: 2021-06-18 | End: 2021-06-18 | Stop reason: HOSPADM

## 2021-06-17 RX ORDER — NITROGLYCERIN 0.4 MG/1
0.4 TABLET SUBLINGUAL
Status: DISCONTINUED | OUTPATIENT
Start: 2021-06-17 | End: 2021-06-18 | Stop reason: HOSPADM

## 2021-06-17 RX ORDER — CARVEDILOL 6.25 MG/1
6.25 TABLET ORAL EVERY 12 HOURS SCHEDULED
Status: DISCONTINUED | OUTPATIENT
Start: 2021-06-17 | End: 2021-06-17

## 2021-06-17 RX ORDER — SODIUM CHLORIDE 9 MG/ML
125 INJECTION, SOLUTION INTRAVENOUS CONTINUOUS
Status: DISCONTINUED | OUTPATIENT
Start: 2021-06-17 | End: 2021-06-18 | Stop reason: HOSPADM

## 2021-06-17 RX ORDER — HEPARIN SODIUM 5000 [USP'U]/ML
5000 INJECTION, SOLUTION INTRAVENOUS; SUBCUTANEOUS EVERY 8 HOURS SCHEDULED
Status: DISCONTINUED | OUTPATIENT
Start: 2021-06-17 | End: 2021-06-18 | Stop reason: HOSPADM

## 2021-06-17 RX ORDER — ATORVASTATIN CALCIUM 40 MG/1
40 TABLET, FILM COATED ORAL EVERY EVENING
Status: DISCONTINUED | OUTPATIENT
Start: 2021-06-17 | End: 2021-06-18 | Stop reason: HOSPADM

## 2021-06-17 RX ORDER — SENNOSIDES 8.6 MG
1 TABLET ORAL
Status: DISCONTINUED | OUTPATIENT
Start: 2021-06-17 | End: 2021-06-18 | Stop reason: HOSPADM

## 2021-06-17 RX ORDER — ACETAMINOPHEN 325 MG/1
650 TABLET ORAL EVERY 4 HOURS PRN
Status: DISCONTINUED | OUTPATIENT
Start: 2021-06-17 | End: 2021-06-18 | Stop reason: HOSPADM

## 2021-06-17 RX ORDER — ASPIRIN 81 MG/1
324 TABLET, CHEWABLE ORAL ONCE
Status: COMPLETED | OUTPATIENT
Start: 2021-06-17 | End: 2021-06-17

## 2021-06-17 RX ORDER — HEPARIN SODIUM 1000 [USP'U]/ML
INJECTION, SOLUTION INTRAVENOUS; SUBCUTANEOUS CODE/TRAUMA/SEDATION MEDICATION
Status: COMPLETED | OUTPATIENT
Start: 2021-06-17 | End: 2021-06-17

## 2021-06-17 RX ADMIN — CLOPIDOGREL BISULFATE 600 MG: 75 TABLET ORAL at 08:03

## 2021-06-17 RX ADMIN — FENTANYL CITRATE 25 MCG: 50 INJECTION INTRAMUSCULAR; INTRAVENOUS at 09:36

## 2021-06-17 RX ADMIN — MIDAZOLAM 1 MG: 1 INJECTION INTRAMUSCULAR; INTRAVENOUS at 09:36

## 2021-06-17 RX ADMIN — MIDAZOLAM 2 MG: 1 INJECTION INTRAMUSCULAR; INTRAVENOUS at 08:38

## 2021-06-17 RX ADMIN — FENTANYL CITRATE 50 MCG: 50 INJECTION INTRAMUSCULAR; INTRAVENOUS at 08:38

## 2021-06-17 RX ADMIN — SODIUM CHLORIDE 125 ML/HR: 0.9 INJECTION, SOLUTION INTRAVENOUS at 07:50

## 2021-06-17 RX ADMIN — FENTANYL CITRATE 25 MCG: 50 INJECTION INTRAMUSCULAR; INTRAVENOUS at 09:20

## 2021-06-17 RX ADMIN — CARVEDILOL 12.5 MG: 12.5 TABLET, FILM COATED ORAL at 15:46

## 2021-06-17 RX ADMIN — ASPIRIN 324 MG: 81 TABLET, CHEWABLE ORAL at 08:03

## 2021-06-17 RX ADMIN — ATORVASTATIN CALCIUM 40 MG: 40 TABLET, FILM COATED ORAL at 18:23

## 2021-06-17 RX ADMIN — LIDOCAINE HYDROCHLORIDE 5 ML: 10 INJECTION, SOLUTION EPIDURAL; INFILTRATION; INTRACAUDAL; PERINEURAL at 08:42

## 2021-06-17 RX ADMIN — HEPARIN SODIUM 2500 UNITS: 1000 INJECTION INTRAVENOUS; SUBCUTANEOUS at 09:21

## 2021-06-17 RX ADMIN — IOHEXOL 145 ML: 350 INJECTION, SOLUTION INTRAVENOUS at 10:17

## 2021-06-17 RX ADMIN — HEPARIN SODIUM 5000 UNITS: 5000 INJECTION INTRAVENOUS; SUBCUTANEOUS at 21:13

## 2021-06-17 RX ADMIN — FENTANYL CITRATE 25 MCG: 50 INJECTION INTRAMUSCULAR; INTRAVENOUS at 09:47

## 2021-06-17 RX ADMIN — MIDAZOLAM 1 MG: 1 INJECTION INTRAMUSCULAR; INTRAVENOUS at 09:20

## 2021-06-17 RX ADMIN — MIDAZOLAM 1 MG: 1 INJECTION INTRAMUSCULAR; INTRAVENOUS at 09:46

## 2021-06-17 RX ADMIN — SENNOSIDES 8.6 MG: 8.6 TABLET, FILM COATED ORAL at 23:33

## 2021-06-17 RX ADMIN — HEPARIN SODIUM 7500 UNITS: 1000 INJECTION INTRAVENOUS; SUBCUTANEOUS at 08:58

## 2021-06-17 RX ADMIN — ISOSORBIDE MONONITRATE 30 MG: 30 TABLET, EXTENDED RELEASE ORAL at 15:45

## 2021-06-17 NOTE — PROGRESS NOTES
Tiger text Suma Evangelista regarding groin bleed without hematoma  Held pressure Hemostasis, new guaze and Tegaderm applied   Also made her aware of arrhythmias, no orders given will continue to monitor patient

## 2021-06-17 NOTE — DISCHARGE INSTRUCTIONS
1  Please see the post angioplasty discharge instructions  No heavy lifting, greater than 10 lbs  or strenuous activity for 1 week  Follow angioplasty discharge instructions  2 Remove band aid tomorrow  Shower and wash area- groin gently with soap and water- beginning tomorrow  Rinse and pat dry  Apply new water seal band aid  Repeat this process for 5 days  No powders, creams lotions or antibiotic ointments  for 5 days  No tub baths, hot tubs or swimming for 5 days  3  Call [place] [address] [facility] ([phone]) if you develop a fever, redness or drainage at your groin access site  4  No driving for 2 days    5  Do not stop aspirin or Plavix (clopiogrel) any reason without a cardiologists consent, or the stent could block up and cause a heart attack  6  Stent card and book  7 Perclose Booklet     Coronary Intravascular Stent Placement   WHAT YOU SHOULD KNOW:   Coronary intravascular stent placement is a procedure to place a stent in an artery of your heart that has plaque buildup  Plaque is a mixture of fat and cholesterol  A stent is a small mesh tube made of metal that helps keep your artery open  Your caregiver may place a bare metal stent or a drug-eluting stent (BRIGHT) in your artery  A BRIGHT is coated with medicine that is slowly released and helps prevent more plaque buildup in the area where the stent is placed  The stent remains in your artery for life  You may need more than one stent  AFTER YOU LEAVE:   Medicines: You will be given any of the following:  · Antiplatelets  prevent blood clots from forming  You will need to take aspirin and another type of platelet medicine  Take this medicine daily as directed  Do not stop taking aspirin or other type of antiplatelet medicine  · Nitrates , such as nitroglycerin, relax the arteries of your heart so it gets more oxygen  This medicine helps to relieve chest pain      · Cholesterol medicine  helps decrease the amount of cholesterol in your blood  · Blood pressure medicine  lowers your blood pressure  · Take your medicine as directed  Call your healthcare provider if you think your medicine is not helping or if you have side effects  Tell him if you are allergic to any medicine  Keep a list of the medicines, vitamins, and herbs you take  Include the amounts, and when and why you take them  Bring the list or the pill bottles to follow-up visits  Carry your medicine list with you in case of an emergency  Follow up with your cardiologist as directed:  Write down your questions so you remember to ask them during your visits  Activity:   · Avoid unnecessary stair climbing for 48 hours, if a catheter was put in your groin  · Do not place pressure on your arm, hand, or wrist, if the catheter was placed in your wrist  Avoid pushing, pulling, or heavy lifting with that arm  · If you need to cough, support the area where the catheter was inserted with your hand  · Ask your cardiologist how long you need to limit movement and avoid certain activities  · You may feel like resting more after your procedure  Slowly start to do more each day  Rest when you feel it is needed  Wound care:  Ask your cardiologist about how to care for your incision wound  Ask when you can get into a tub, shower, or pool  Do not smoke: If you smoke, it is never too late to quit  Smoking increases your risk for heart disease and stroke  Ask your cardiologist for information if you need help quitting  Cardiac rehab:  Your cardiologist may recommend that you attend cardiac rehabilitation (rehab)  This is a program run by specialists who will help you safely strengthen your heart and reduce the risk of more heart disease  The plan includes exercise, relaxation, stress management, and heart-healthy nutrition  Caregivers will also check to make sure any medicines you are taking are working     Contact your cardiologist if:   · You have a fever or chills  · You have questions or concerns about your condition or care  Seek care immediately or call 911 if:   · Your leg or arm, used for the procedure, becomes numb or turns white or blue  · The area where the catheter was placed is swollen, red, or has pus or foul-smelling fluid coming from it  · Your arm or leg feels warm, tender, and painful  It may look swollen and red  · You start to bleed from your catheter site again  · You have any of the following signs of a heart attack:     ¨ Squeezing, pressure, fullness, or pain in your chest that lasts longer than a few minutes or returns     ¨ Discomfort or pain in your back, neck, jaw, stomach, or arm    ¨ Shortness of breath or breathing problems    ¨ A sudden cold sweat, lightheadedness, dizziness, or nausea, especially with chest pain or trouble breathing    · You have any of the following signs of a stroke:     ¨ Part of your face droops or is numb    ¨ Weakness in an arm or leg    ¨ Confusion or difficulty speaking    ¨ Dizziness, a severe headache, or vision loss  © 2014 [address] [employer] is for End User's use only and may not be sold, redistributed or otherwise used for commercial purposes  All illustrations and images included in CareNotes® are the copyrighted property of [employer]  or [employer]  The above information is an  only  It is not intended as medical advice for individual conditions or treatments  Talk to your doctor, nurse or pharmacist before following any medical regimen to see if it is safe and effective for you  Moderate Sedation   WHAT YOU NEED TO KNOW:   Moderate sedation, or conscious sedation, is medicine used during procedures to help you feel relaxed and calm  You will be awake and able to follow directions without anxiety or pain  You will remember little to none of the procedure  You may feel tired, weak, or unsteady on your feet after you get sedation  You may also have trouble concentrating or short-term memory loss  These symptoms should go away in 24 hours or less  DISCHARGE INSTRUCTIONS:   Call 911 or have someone else call for any of the following:   · You have sudden trouble breathing  · You cannot be woken  Return to the emergency department if:   · You have a severe headache or dizziness  · Your heart is beating faster than usual     Contact your healthcare provider if:   · You have a fever  · You have nausea or are vomiting for more than 8 hours after the procedure  · Your skin is itchy, swollen, or you have a rash  · You have questions or concerns about your condition or care  Self-care:   · Have someone stay with you for 24 hours  This person can drive you to errands and help you do things around the house  This person can also watch for problems  · Rest and do quiet activities for 24 hours  Do not exercise, ride a bike, or play sports  Stand up slowly to prevent dizziness and falls  Take short walks around the house with another person  Slowly return to your usual activities the next day  · Do not drive or use dangerous machines or tools for 24 hours  You may injure yourself or others  Examples include a lawnmower, saw, or drill  Do not return to work for 24 hours if you use dangerous machines or tools for work  · Do not make important decisions for 24 hours  For example, do not sign important papers or invest money  · Drink liquids as directed  Liquids help flush the sedation medicine out of your body  Ask how much liquid to drink each day and which liquids are best for you  · Eat small, frequent meals to prevent nausea and vomiting  Start with clear liquids such as juice or broth  If you do not vomit after clear liquids, you can eat your usual foods  · Do not drink alcohol or take medicines that make you drowsy  This includes medicines that help you sleep and anxiety medicines   Ask your healthcare provider if it is safe for you to take pain medicine  Follow up with your healthcare provider as directed:  Write down your questions so you remember to ask them during your visits  © Copyright [address] Information is for End User's use only and may not be sold, redistributed or otherwise used for commercial purposes  All illustrations and images included in CareNotes® are the copyrighted property of [employer]  or [employer]  The above information is an  only  It is not intended as medical advice for individual conditions or treatments  Talk to your doctor, nurse or pharmacist before following any medical regimen to see if it is safe and effective for you

## 2021-06-17 NOTE — INTERVAL H&P NOTE
H&P reviewed  After examining the patient, I find no changed to the H&P since it had been written  BP (!) 185/80   Pulse 69   Temp 98 °F (36 7 °C)   Resp 18   Ht 5' 4" (1 626 m)   Wt 67 1 kg (148 lb)   SpO2 98%   BMI 25 40 kg/m²      Patient re-evaluated  Accept as history and physical   Abnormal exercise nuclear stress test with history of CABG      Kari Katz MD/June 17, 2021/7:53 AM      [facility]

## 2021-06-18 VITALS
TEMPERATURE: 97.8 F | HEART RATE: 62 BPM | SYSTOLIC BLOOD PRESSURE: 92 MMHG | OXYGEN SATURATION: 97 % | RESPIRATION RATE: 16 BRPM | HEIGHT: 64 IN | BODY MASS INDEX: 25.27 KG/M2 | DIASTOLIC BLOOD PRESSURE: 54 MMHG | WEIGHT: 148 LBS

## 2021-06-18 PROBLEM — I25.10 CORONARY ARTERY DISEASE INVOLVING NATIVE CORONARY ARTERY: Status: ACTIVE | Noted: 2021-06-04

## 2021-06-18 LAB
ANION GAP SERPL CALCULATED.3IONS-SCNC: 2 MMOL/L (ref 4–13)
BUN SERPL-MCNC: 10 MG/DL (ref 5–25)
CALCIUM SERPL-MCNC: 9 MG/DL (ref 8.3–10.1)
CHLORIDE SERPL-SCNC: 111 MMOL/L (ref 100–108)
CO2 SERPL-SCNC: 29 MMOL/L (ref 21–32)
CREAT SERPL-MCNC: 0.67 MG/DL (ref 0.6–1.3)
ERYTHROCYTE [DISTWIDTH] IN BLOOD BY AUTOMATED COUNT: 13.8 % (ref 11.6–15.1)
GFR SERPL CREATININE-BSD FRML MDRD: 91 ML/MIN/1.73SQ M
GLUCOSE SERPL-MCNC: 100 MG/DL (ref 65–140)
HCT VFR BLD AUTO: 39.4 % (ref 36.5–49.3)
HGB BLD-MCNC: 13 G/DL (ref 12–17)
MAGNESIUM SERPL-MCNC: 2.1 MG/DL (ref 1.6–2.6)
MCH RBC QN AUTO: 30.6 PG (ref 26.8–34.3)
MCHC RBC AUTO-ENTMCNC: 33 G/DL (ref 31.4–37.4)
MCV RBC AUTO: 93 FL (ref 82–98)
PLATELET # BLD AUTO: 153 THOUSANDS/UL (ref 149–390)
PMV BLD AUTO: 10.1 FL (ref 8.9–12.7)
POTASSIUM SERPL-SCNC: 4.1 MMOL/L (ref 3.5–5.3)
RBC # BLD AUTO: 4.25 MILLION/UL (ref 3.88–5.62)
SODIUM SERPL-SCNC: 142 MMOL/L (ref 136–145)
WBC # BLD AUTO: 4.88 THOUSAND/UL (ref 4.31–10.16)

## 2021-06-18 PROCEDURE — 80048 BASIC METABOLIC PNL TOTAL CA: CPT | Performed by: STUDENT IN AN ORGANIZED HEALTH CARE EDUCATION/TRAINING PROGRAM

## 2021-06-18 PROCEDURE — 85027 COMPLETE CBC AUTOMATED: CPT | Performed by: STUDENT IN AN ORGANIZED HEALTH CARE EDUCATION/TRAINING PROGRAM

## 2021-06-18 PROCEDURE — NC001 PR NO CHARGE: Performed by: INTERNAL MEDICINE

## 2021-06-18 PROCEDURE — 83735 ASSAY OF MAGNESIUM: CPT | Performed by: STUDENT IN AN ORGANIZED HEALTH CARE EDUCATION/TRAINING PROGRAM

## 2021-06-18 RX ORDER — CARVEDILOL 6.25 MG/1
6.25 TABLET ORAL EVERY 12 HOURS SCHEDULED
Qty: 60 TABLET | Refills: 4 | Status: SHIPPED | OUTPATIENT
Start: 2021-06-18

## 2021-06-18 RX ORDER — CARVEDILOL 6.25 MG/1
6.25 TABLET ORAL EVERY 12 HOURS SCHEDULED
Qty: 30 TABLET | Refills: 4 | Status: SHIPPED | OUTPATIENT
Start: 2021-06-18 | End: 2021-06-18

## 2021-06-18 RX ORDER — ASPIRIN 81 MG/1
81 TABLET, CHEWABLE ORAL DAILY
Qty: 30 TABLET | Refills: 11 | Status: SHIPPED | OUTPATIENT
Start: 2021-06-19

## 2021-06-18 RX ORDER — ATORVASTATIN CALCIUM 40 MG/1
40 TABLET, FILM COATED ORAL EVERY EVENING
Qty: 30 TABLET | Refills: 3 | Status: SHIPPED | OUTPATIENT
Start: 2021-06-18

## 2021-06-18 RX ORDER — CLOPIDOGREL BISULFATE 75 MG/1
75 TABLET ORAL DAILY
Qty: 30 TABLET | Refills: 11 | Status: SHIPPED | OUTPATIENT
Start: 2021-06-19

## 2021-06-18 RX ADMIN — ASPIRIN 81 MG CHEWABLE TABLET 81 MG: 81 TABLET CHEWABLE at 08:27

## 2021-06-18 RX ADMIN — CLOPIDOGREL BISULFATE 75 MG: 75 TABLET ORAL at 08:27

## 2021-06-18 RX ADMIN — HEPARIN SODIUM 5000 UNITS: 5000 INJECTION INTRAVENOUS; SUBCUTANEOUS at 05:27

## 2021-06-18 RX ADMIN — CARVEDILOL 6.25 MG: 6.25 TABLET, FILM COATED ORAL at 08:27

## 2021-06-18 RX ADMIN — FOLIC ACID-PYRIDOXINE-CYANOCOBALAMIN TAB 2.5-25-2 MG 1 TABLET: 2.5-25-2 TAB at 09:59

## 2021-06-18 RX ADMIN — ISOSORBIDE MONONITRATE 30 MG: 30 TABLET, EXTENDED RELEASE ORAL at 08:27

## 2021-06-18 NOTE — CASE MANAGEMENT
No CM consult; No CM needs identified  CM will continue to follow and be available for discharging planning as needed

## 2021-06-21 ENCOUNTER — APPOINTMENT (OUTPATIENT)
Dept: LAB | Facility: CLINIC | Age: 81
End: 2021-06-21
Payer: MEDICARE

## 2021-06-21 DIAGNOSIS — Z98.61 CAD S/P PERCUTANEOUS CORONARY ANGIOPLASTY: ICD-10-CM

## 2021-06-21 DIAGNOSIS — I25.10 CAD S/P PERCUTANEOUS CORONARY ANGIOPLASTY: ICD-10-CM

## 2021-06-21 LAB
ANION GAP SERPL CALCULATED.3IONS-SCNC: 6 MMOL/L (ref 4–13)
BUN SERPL-MCNC: 15 MG/DL (ref 5–25)
CALCIUM SERPL-MCNC: 9.3 MG/DL (ref 8.3–10.1)
CHLORIDE SERPL-SCNC: 106 MMOL/L (ref 100–108)
CO2 SERPL-SCNC: 28 MMOL/L (ref 21–32)
CREAT SERPL-MCNC: 0.85 MG/DL (ref 0.6–1.3)
ERYTHROCYTE [DISTWIDTH] IN BLOOD BY AUTOMATED COUNT: 13.9 % (ref 11.6–15.1)
GFR SERPL CREATININE-BSD FRML MDRD: 82 ML/MIN/1.73SQ M
GLUCOSE P FAST SERPL-MCNC: 127 MG/DL (ref 65–99)
HCT VFR BLD AUTO: 42.7 % (ref 36.5–49.3)
HGB BLD-MCNC: 13.7 G/DL (ref 12–17)
MCH RBC QN AUTO: 30.6 PG (ref 26.8–34.3)
MCHC RBC AUTO-ENTMCNC: 32.1 G/DL (ref 31.4–37.4)
MCV RBC AUTO: 96 FL (ref 82–98)
PLATELET # BLD AUTO: 179 THOUSANDS/UL (ref 149–390)
PMV BLD AUTO: 11.1 FL (ref 8.9–12.7)
POTASSIUM SERPL-SCNC: 5.3 MMOL/L (ref 3.5–5.3)
RBC # BLD AUTO: 4.47 MILLION/UL (ref 3.88–5.62)
SODIUM SERPL-SCNC: 140 MMOL/L (ref 136–145)
WBC # BLD AUTO: 5.75 THOUSAND/UL (ref 4.31–10.16)

## 2021-06-21 PROCEDURE — 36415 COLL VENOUS BLD VENIPUNCTURE: CPT

## 2021-06-21 PROCEDURE — 80048 BASIC METABOLIC PNL TOTAL CA: CPT

## 2021-06-21 PROCEDURE — 85027 COMPLETE CBC AUTOMATED: CPT

## 2021-06-22 ENCOUNTER — APPOINTMENT (OUTPATIENT)
Dept: SPEECH THERAPY | Facility: CLINIC | Age: 81
End: 2021-06-22
Payer: MEDICARE

## 2021-06-23 ENCOUNTER — APPOINTMENT (OUTPATIENT)
Dept: SPEECH THERAPY | Facility: CLINIC | Age: 81
End: 2021-06-23
Payer: MEDICARE

## 2021-06-23 NOTE — PROGRESS NOTES
Speech-Language Pathology Re-Evaluation/Progress Update    Today's date: 2021  Patients name: Leandro Jackson  : 1940  MRN: [id number]  Safety measures: Dementia  Referring provider: Mahogany Leon MD    Primary diagnosis/billing code: R 48 8  Secondary diagnosis/billing code: F 03 90    Visit tracking:  -Referring provider: [address] [place] [address] guidelines: CMS  -Visit #3/10 (7 Total) ***  -Insurance: Medicare and [facility]   -RE due ***    Subjective comments: ***    Patient's goal(s): "To be more stable "    Assessments    No formal/updated testing was completed today as Patient has only been seen 2X since last re-evaluation  The following serves as a diagnostic treatment session:     Goals    Short Term Goals  1  Patient will be educated on the use of internal and external memory aids and compensatory strategies with 80% accuracy to facilitate increased recall of routine, personal information, and recent events, to be achieved in 4-6 weeks  --MET    2  Patient will complete auditory immediate and short term memory tasks to 80% accuracy to facilitate increased ability to retell narratives and recall information within functional living environment, to be achieved in 4-6 weeks  --PARTIALLY MET    3  Patient will complete complex auditory attention processing tasks (e g , sentence unscramble, ranking numbers/words, etc ) to improve working memory with 80% accuracy, to be achieved in 4-6 weeks  --PARTIALLY MET    4  Patient will facilitate planning by completing thought organization tasks (e g , sequencing, deduction puzzles, etc ) with 80% accuracy to facilitate increased executive functioning, working memory, problem solving, and processing skills, to be achieved in 4-6 weeks  --PARTIALLY MET    5  Patient will complete concrete and abstract categorization tasks to 80% accuracy to facilitate improved generative naming skills and working memory, to be achieved in 4-6 weeks  --PARTIALLY MET    6   Patient will utilize word finding strategies during semantic feature analysis treatment activity for improved naming and verbal expression skills  --PARTIALLY MET    Long Term Goals    1  Patient will demonstrate cognitive-communication skills consistent with age and education given use of compensatory strategies when needed to resume baseline activities and responsibilities in home, community, and work/school settings by discharge  --PARTIALLY MET    2  Patient will complete cognitive-linguistic therapy that addresses patients specific deficits in processing speed, short-term working memory, attention to detail, monitoring, sequencing, and organization skills, with instruction, to alleviate effects of executive functioning disorder deficits by discharge  --PARTIALLY MET    3  Patient will complete higher-level expressive language tasks (e g , word definitions, idioms, synonym/antonyms, etc) with 80% accuracy to improve functional communication skills by discharge  --PARTIALLY MET    Impressions/Recommendations    Impressions:   -Patient presents with moderate cognitive-linguistic deficits characterized by decreased short term memory, attention, and executive functions secondary to Dementia  Patient has demonstrated strengths in organization throughout therapy sessions thus far  Patient keeps all his doctor's information in a file folder with labels  He also keeps his appointments on a calendar  Patient demonstrated weakness today on BCAT testing in delayed memory (words and story), clock drawing, trail making, and generative naming  At this time, it is recommended that Patient participate in cognitive-linguistic therapy 1X a week to increase cognitive-linguistic skills and learn compensatory strategies for memory and attention  Prognosis is good based on familial support and Patient motivation   ***    Recommendations:  -Patient would benefit from outpatient skilled Speech Therapy services : {BENEFIT OJWC:3514453}    -Frequency: {FREQUENCY:32020}  -Duration: {DURATION:23552}    -Intervention certification from: 0/48/7012  -Intervention certification to: ***    -Intervention comments:   ***

## 2021-06-24 ENCOUNTER — EVALUATION (OUTPATIENT)
Dept: SPEECH THERAPY | Facility: CLINIC | Age: 81
End: 2021-06-24
Payer: MEDICARE

## 2021-06-24 DIAGNOSIS — F03.90 DEMENTIA WITHOUT BEHAVIORAL DISTURBANCE, UNSPECIFIED DEMENTIA TYPE (HCC): ICD-10-CM

## 2021-06-24 DIAGNOSIS — R48.8 OTHER SYMBOLIC DYSFUNCTIONS: Primary | ICD-10-CM

## 2021-06-24 PROCEDURE — 92507 TX SP LANG VOICE COMM INDIV: CPT

## 2021-06-24 NOTE — PROGRESS NOTES
Speech-Language Pathology Re-Evaluation/Progress Update    Today's date: 2021  Patients name: Misyt Ware  : 1940  MRN: [id number]  Safety measures: Dementia  Referring provider: Adia Soares MD    Primary diagnosis/billing code: R 48 8  Secondary diagnosis/billing code: F 03 90    Visit tracking:  -Referring provider: Epic  -Billing guidelines: CMS  -Visit #1/10 (8 Total)   -Insurance: Medicare and [address] due 2021    Subjective comments: "I am going to get my brain worked on "    Patient's goal(s): "To be more stable "    Assessments    No formal/updated testing was completed today as Patient has only been seen 2X since last re-evaluation  The following serves as a diagnostic treatment session:     Goals    Short Term Goals  1  Patient will be educated on the use of internal and external memory aids and compensatory strategies with 80% accuracy to facilitate increased recall of routine, personal information, and recent events, to be achieved in 4-6 weeks  --MET    2  Patient will complete auditory immediate and short term memory tasks to 80% accuracy to facilitate increased ability to retell narratives and recall information within functional living environment, to be achieved in 4-6 weeks  --PARTIALLY MET    To target immediate memory: Patient was presented with schedules of increasing length/complexity (completed over 5 levels) and was asked to study the schedule for 30 seconds  Patient was then asked questions regarding what he had to do that day according to the schedule  Task completed in 10/12 (83%) opportunities independently, increasing to  (100%) opportunities given multiple choice  To target delayed memory: Patient was asked the same questions regarding schedules from above approx  35 minutes later  Patient completed task in 8/ (67%) opportunities independently, increasing to  (100%) opportunities given multiple choice       3  Patient will complete complex auditory attention processing tasks (e g , sentence unscramble, ranking numbers/words, etc ) to improve working memory with 80% accuracy, to be achieved in 4-6 weeks  --PARTIALLY MET    To target auditory attention: Patient was verbally presented with a list of 4 words and was instructed to re-order the words in the most logical sequence  Patient completed this task in 4/10 (40%) opportunities independently, increasing to 10/10 (100%) opportunities given mod-max verbal cues  4  Patient will facilitate planning by completing thought organization tasks (e g , sequencing, deduction puzzles, etc ) with 80% accuracy to facilitate increased executive functioning, working memory, problem solving, and processing skills, to be achieved in 4-6 weeks  --PARTIALLY MET    To target thought organization: Patient was presented with a blank schedule and was instructed to read a paragraph in order to fill in the appropriate errands  Patient completed this task in 4/8 (50%) opportunities independently, increasing to 8/8 (100%) opportunities given mod-max verbal cues  5  Patient will complete concrete and abstract categorization tasks to 80% accuracy to facilitate improved generative naming skills and working memory, to be achieved in 4-6 weeks  --PARTIALLY MET    6  Patient will utilize word finding strategies during semantic feature analysis treatment activity for improved naming and verbal expression skills  --PARTIALLY MET    Long Term Goals    1  Patient will demonstrate cognitive-communication skills consistent with age and education given use of compensatory strategies when needed to resume baseline activities and responsibilities in home, community, and work/school settings by discharge  --PARTIALLY MET    2   Patient will complete cognitive-linguistic therapy that addresses patients specific deficits in processing speed, short-term working memory, attention to detail, monitoring, sequencing, and organization skills, with instruction, to alleviate effects of executive functioning disorder deficits by discharge  --PARTIALLY MET    3  Patient will complete higher-level expressive language tasks (e g , word definitions, idioms, synonym/antonyms, etc) with 80% accuracy to improve functional communication skills by discharge  --PARTIALLY MET    Impressions/Recommendations    Impressions:   -Patient presents with moderate cognitive-linguistic deficits characterized by decreased short term memory, attention, and executive functions secondary to Dementia  Patient has demonstrated strengths in organization throughout therapy sessions thus far  Patient keeps all his doctor's information in a file folder with labels  He also keeps his appointments on a calendar  At this time, it is recommended that Patient participate in cognitive-linguistic therapy 1X a week to increase cognitive-linguistic skills and learn compensatory strategies for memory and attention  It should be noted that Patient will also begin with a clinical trial taking place in [place], [place] for a new drug to help with Dementia symptoms  Prognosis is good based on familial support and Patient motivation       Recommendations:  -Patient would benefit from outpatient skilled Speech Therapy services : Cognitive-Linguistic therapy    -Frequency: 1x weekly  -Duration: 6-8 weeks    -Intervention certification from: 0/14/4613  -Intervention certification to: 70/98/0021

## 2021-06-29 ENCOUNTER — OFFICE VISIT (OUTPATIENT)
Dept: SPEECH THERAPY | Facility: CLINIC | Age: 81
End: 2021-06-29
Payer: MEDICARE

## 2021-06-29 DIAGNOSIS — F03.90 DEMENTIA WITHOUT BEHAVIORAL DISTURBANCE, UNSPECIFIED DEMENTIA TYPE (HCC): ICD-10-CM

## 2021-06-29 DIAGNOSIS — R48.8 OTHER SYMBOLIC DYSFUNCTIONS: Primary | ICD-10-CM

## 2021-06-29 PROCEDURE — 92507 TX SP LANG VOICE COMM INDIV: CPT

## 2021-06-29 NOTE — PROGRESS NOTES
Daily Speech Treatment Note    Today's date: 2021  Patients name: Jeannette Hampton  : 1940  MRN: [id number]  Safety measures: Dementia  Referring provider: Marika Galloway MD    Primary diagnosis/billing code: R 48 8  Secondary diagnosis/billing code: F 03 90    Visit tracking:  -Referring provider: Epic  -Billing guidelines: CMS  -Visit #/10 (9 Total)   -Insurance: Medicare and [address] due 2021    Subjective/Behavioral:  -"I drove out here in my convertible "     Objective/Assessment:  -Reviewed testing results and goals in plan care with patient  Patient is in agreement at this time  Short Term Goals  2  Patient will complete auditory immediate and short term memory tasks to 80% accuracy to facilitate increased ability to retell narratives and recall information within functional living environment, to be achieved in 4-6 weeks  --PARTIALLY MET    3  Patient will complete complex auditory attention processing tasks (e g , sentence unscramble, ranking numbers/words, etc ) to improve working memory with 80% accuracy, to be achieved in 4-6 weeks  --PARTIALLY MET    To target auditory attention: Patient answered complex true/false questions (e g  If you switched the positions of the corn and the horn, the corn would be under the horn)  Patient was also looking at questions as they were being read aloud  Patient completed task in 4/6 (67%) opportunities independently, increasing to 6/6 (100%) opportunities given mod verbal cues  4  Patient will facilitate planning by completing thought organization tasks (e g , sequencing, deduction puzzles, etc ) with 80% accuracy to facilitate increased executive functioning, working memory, problem solving, and processing skills, to be achieved in 4-6 weeks  --PARTIALLY MET    To target thought organization: Patient utilized a grid to decode a message given coordinates (e g  1-A, 2-E, 6-B)    Patient completed this task in 4/6 (67%) opportunities independently, increasing to 6/6 (100%) opportunities given min verbal cues  To target thought organization: Patient completed a 2X5 deduction puzzle [facility] 9: Page 80) utilizing the clues provided  Patient completed task in 2/10 (20%) opportunities independently, increasing to 10/10 (100%) opportunities given max verbal cues  5  Patient will complete concrete and abstract categorization tasks to 80% accuracy to facilitate improved generative naming skills and working memory, to be achieved in 4-6 weeks  --PARTIALLY MET    6  Patient will utilize word finding strategies during semantic feature analysis treatment activity for improved naming and verbal expression skills  --PARTIALLY MET    Plan:  -Patient was provided with home exercises/activities to target goals in plan of care at the end of today's session   -Continue with current plan of care

## 2021-07-06 ENCOUNTER — APPOINTMENT (OUTPATIENT)
Dept: SPEECH THERAPY | Facility: CLINIC | Age: 81
End: 2021-07-06
Payer: MEDICARE

## 2021-07-09 ENCOUNTER — OFFICE VISIT (OUTPATIENT)
Dept: SPEECH THERAPY | Facility: CLINIC | Age: 81
End: 2021-07-09
Payer: MEDICARE

## 2021-07-09 DIAGNOSIS — R48.8 OTHER SYMBOLIC DYSFUNCTIONS: Primary | ICD-10-CM

## 2021-07-09 DIAGNOSIS — F03.90 DEMENTIA WITHOUT BEHAVIORAL DISTURBANCE, UNSPECIFIED DEMENTIA TYPE (HCC): ICD-10-CM

## 2021-07-09 PROCEDURE — 92507 TX SP LANG VOICE COMM INDIV: CPT

## 2021-07-09 NOTE — PROGRESS NOTES
Daily Speech Treatment Note    Today's date: 2021  Patients name: Marissa Alex  : 1940  MRN: [id number]  Safety measures: Dementia  Referring provider: Gregorio Seals MD    Primary diagnosis/billing code: R 48 8  Secondary diagnosis/billing code: F 03 90    Visit tracking:  -Referring provider: Epic  -Billing guidelines: CMS  -Visit #3/10 (10 Total)    -Insurance: Medicare and [address] [facility] due 2021    Subjective/Behavioral:  -"I'm good "    Objective/Assessment:  -Reviewed testing results and goals in plan care with patient  Patient is in agreement at this time  Short Term Goals  2  Patient will complete auditory immediate and short term memory tasks to 80% accuracy to facilitate increased ability to retell narratives and recall information within functional living environment, to be achieved in 4-6 weeks  --PARTIALLY MET    To target auditory immediate memory: Patient was verbally presented with a list of 5 words that were related (I e  Movie Theater words) and was instructed to make a story using the words  Patient created a story and practiced saying it three times  Patient recalled words in a story with 100% accuracy  To target auditory delayed memory: Patient was asked approx  30 minutes later to recall the 5 words in a story  Patient recalled words with 60% accuracy, increasing to 100% accuracy given min verbal cues  3  Patient will complete complex auditory attention processing tasks (e g , sentence unscramble, ranking numbers/words, etc ) to improve working memory with 80% accuracy, to be achieved in 4-6 weeks  --PARTIALLY MET    4  Patient will facilitate planning by completing thought organization tasks (e g , sequencing, deduction puzzles, etc ) with 80% accuracy to facilitate increased executive functioning, working memory, problem solving, and processing skills, to be achieved in 4-6 weeks   --PARTIALLY MET    To target thought organization: Patient played "Free Flow" game on clinic iPad where he was instructed to make connections between colored dots  Patient played levels 1-15  Patient demonstrated proficiency at this game without the need for verbal cues  To target thought organization and divided attention: Patient created patterns using colorful blocks to match a template while simultaneously counting backwards by 3  Patient completed this task over 2 trials  During trial one, Patient made 2 errors on pattern and 0 errors on math  During trial two, Patient made 3 errors on pattern and 4 errors on math  It should be noted that Patient was given a prompt before each math problem (e g  Okay, what is 410-3?)  5  Patient will complete concrete and abstract categorization tasks to 80% accuracy to facilitate improved generative naming skills and working memory, to be achieved in 4-6 weeks  --PARTIALLY MET    6  Patient will utilize word finding strategies during semantic feature analysis treatment activity for improved naming and verbal expression skills  --PARTIALLY MET    Plan:  -Patient was provided with home exercises/activities to target goals in plan of care at the end of today's session   -Continue with current plan of care

## 2021-07-13 ENCOUNTER — OFFICE VISIT (OUTPATIENT)
Dept: SPEECH THERAPY | Facility: CLINIC | Age: 81
End: 2021-07-13
Payer: MEDICARE

## 2021-07-13 DIAGNOSIS — F03.90 DEMENTIA WITHOUT BEHAVIORAL DISTURBANCE, UNSPECIFIED DEMENTIA TYPE (HCC): ICD-10-CM

## 2021-07-13 DIAGNOSIS — R48.8 OTHER SYMBOLIC DYSFUNCTIONS: Primary | ICD-10-CM

## 2021-07-13 PROCEDURE — 92507 TX SP LANG VOICE COMM INDIV: CPT

## 2021-07-13 NOTE — PROGRESS NOTES
Daily Speech Treatment Note    Today's date: 2021  Patients name: Xochitl Rivera  : 1940  MRN: [id number]  Safety measures: Dementia  Referring provider: Mikal Montilla MD    Primary diagnosis/billing code: R 48 8  Secondary diagnosis/billing code: F 03 90    Visit tracking:  -Referring provider: Epic  -Billing guidelines: CMS  -Visit #4/10 (11 Total)    -Insurance: Medicare and [employer]   -RE due 2021    Subjective/Behavioral:  -"I had a problem at work, it took a long time "     Objective/Assessment:  -Reviewed testing results and goals in plan care with patient  Patient is in agreement at this time  Short Term Goals  2  Patient will complete auditory immediate and short term memory tasks to 80% accuracy to facilitate increased ability to retell narratives and recall information within functional living environment, to be achieved in 4-6 weeks  --PARTIALLY MET    3  Patient will complete complex auditory attention processing tasks (e g , sentence unscramble, ranking numbers/words, etc ) to improve working memory with 80% accuracy, to be achieved in 4-6 weeks  --PARTIALLY MET    4  Patient will facilitate planning by completing thought organization tasks (e g , sequencing, deduction puzzles, etc ) with 80% accuracy to facilitate increased executive functioning, working memory, problem solving, and processing skills, to be achieved in 4-6 weeks  --PARTIALLY MET    To target thought organization: Patient completed schedule deduction puzzle ([id number]) by utilizing the clues provided  Patient completed task in 5/7 (71%) opportunities independently, increasing to 7/7 (100%) opportunities given min verbal cues  To target thought organization: Patient completed trail making task (I e  Numbers/Shapes) with 75% accuracy  To target thought organization: Patient completed trail making task (I e  Numbers/Letters) with 95% accuracy       5  Patient will complete concrete and abstract categorization tasks to 80% accuracy to facilitate improved generative naming skills and working memory, to be achieved in 4-6 weeks  --PARTIALLY MET    To target generative naming skills; patient participated in "Entitle" game where they were asked to provide a word when given a specific letter and category (i e , boys name __/L/ = Vincenzo Minor)  Task completed in 16/24 (67%) opportunities independently, increasing to 24/24 (100%) opportunities given semantic cues  6  Patient will utilize word finding strategies during semantic feature analysis treatment activity for improved naming and verbal expression skills  --PARTIALLY MET    Plan:  -Patient was provided with home exercises/activities to target goals in plan of care at the end of today's session   -Continue with current plan of care

## 2021-07-20 ENCOUNTER — OFFICE VISIT (OUTPATIENT)
Dept: SPEECH THERAPY | Facility: CLINIC | Age: 81
End: 2021-07-20
Payer: MEDICARE

## 2021-07-20 DIAGNOSIS — R48.8 OTHER SYMBOLIC DYSFUNCTIONS: Primary | ICD-10-CM

## 2021-07-20 DIAGNOSIS — F03.90 DEMENTIA WITHOUT BEHAVIORAL DISTURBANCE, UNSPECIFIED DEMENTIA TYPE (HCC): ICD-10-CM

## 2021-07-20 PROCEDURE — 92507 TX SP LANG VOICE COMM INDIV: CPT

## 2021-07-20 NOTE — PROGRESS NOTES
Daily Speech Treatment Note    Today's date: 2021  Patients name: Leandro Jackson  : 1940  MRN: [id number]  Safety measures: Dementia  Referring provider: Mahogany Leon MD    Primary diagnosis/billing code: R 48 8  Secondary diagnosis/billing code: F 03 90    Visit tracking:  -Referring provider: Epic  -Billing guidelines: CMS  -Visit #5/10 (12 Total)    -Insurance: Medicare and [address] [facility] [address] due 2021    Subjective/Behavioral:  -"My girlfriend surprised me yesterday!"    Objective/Assessment:  -Reviewed testing results and goals in plan care with patient  Patient is in agreement at this time  Short Term Goals  2  Patient will complete auditory immediate and short term memory tasks to 80% accuracy to facilitate increased ability to retell narratives and recall information within functional living environment, to be achieved in 4-6 weeks  --PARTIALLY MET    To target auditory immediate memory:  Patient was verbally presented with word pair associations and was trained using phrase, "When I say ___, you say ___ "  Patient was instructed to recall the second word given in the pair  Patient completed this task in 8/8 (100%) opportunities independently  To target auditory delayed memory: Patient was instructed to recall words from original task approx  40 minutes later after a distraction (goal #4)  Patient recalled words in 7/8 (88%) opportunities independently, increasing to 8/8 (100%) opportunities given min verbal cues  3  Patient will complete complex auditory attention processing tasks (e g , sentence unscramble, ranking numbers/words, etc ) to improve working memory with 80% accuracy, to be achieved in 4-6 weeks  --PARTIALLY MET    4   Patient will facilitate planning by completing thought organization tasks (e g , sequencing, deduction puzzles, etc ) with 80% accuracy to facilitate increased executive functioning, working memory, problem solving, and processing skills, to be achieved in 4-6 weeks  --PARTIALLY MET    To target thought organization: Patient completed math word problems related to time (e g  You left the office at 6:00 PM after working a 10 hour day  What time did you start?)  Patient completed this task in 6/10 (60%) opportunities independently, increasing to 10/10 (100%) opportunities given min verbal cues  5  Patient will complete concrete and abstract categorization tasks to 80% accuracy to facilitate improved generative naming skills and working memory, to be achieved in 4-6 weeks  --PARTIALLY MET    To target word finding and attention; patient completed the card game "Anomia" where they are asked to name people/places/things based on category presented on card (i e , artificial sweetener)  Target of game was for speed of naming and processing  Pt completed level 3 with average of 4 cards min (WNL; goal is 10+)  Pt skipped cards x 5     6  Patient will utilize word finding strategies during semantic feature analysis treatment activity for improved naming and verbal expression skills  --PARTIALLY MET    Plan:  -Patient was provided with home exercises/activities to target goals in plan of care at the end of today's session   -Continue with current plan of care

## 2021-07-27 ENCOUNTER — EVALUATION (OUTPATIENT)
Dept: SPEECH THERAPY | Facility: CLINIC | Age: 81
End: 2021-07-27
Payer: MEDICARE

## 2021-07-27 ENCOUNTER — CLINICAL SUPPORT (OUTPATIENT)
Dept: CARDIAC REHAB | Facility: CLINIC | Age: 81
End: 2021-07-27
Payer: MEDICARE

## 2021-07-27 DIAGNOSIS — I25.10 CAD S/P PERCUTANEOUS CORONARY ANGIOPLASTY: ICD-10-CM

## 2021-07-27 DIAGNOSIS — F03.90 DEMENTIA WITHOUT BEHAVIORAL DISTURBANCE, UNSPECIFIED DEMENTIA TYPE (HCC): ICD-10-CM

## 2021-07-27 DIAGNOSIS — Z98.61 CAD S/P PERCUTANEOUS CORONARY ANGIOPLASTY: ICD-10-CM

## 2021-07-27 DIAGNOSIS — R48.8 OTHER SYMBOLIC DYSFUNCTIONS: Primary | ICD-10-CM

## 2021-07-27 PROCEDURE — 92507 TX SP LANG VOICE COMM INDIV: CPT

## 2021-07-27 PROCEDURE — 93797 PHYS/QHP OP CAR RHAB WO ECG: CPT

## 2021-07-27 PROCEDURE — 96125 COGNITIVE TEST BY HC PRO: CPT

## 2021-07-27 NOTE — PROGRESS NOTES
CARDIAC REHAB ASSESSMENT    Today's date: 2021  Patient name: Leandro Jackson     : 1940       MRN: [id number]  PCP: Sujata Carrero MD  Referring Physician: Gary Mir MD  Cardiologist: Carmella Valadez  Surgeon: N/A  Dx:   Encounter Diagnosis   Name Primary?  CAD S/P percutaneous coronary angioplasty        Date of onset: 2021  Cultural needs: none    Height:    Wt Readings from Last 1 Encounters:   21 67 1 kg (148 lb)      Weight:   Ht Readings from Last 1 Encounters:   21 5' 4" (1 626 m)     Medical History:   Past Medical History:   Diagnosis Date    [facility] [address] )     Claudication in peripheral vascular disease ([facility] )     High cholesterol          Physical Limitations: Shortness of breath and lack of concentration    Fall Risk: Moderate   Comments: Ambulates with a steady gait with no assist device, Denies a fall in the past 6 months and lack of concentration (daughter states dementia)    Anginal Equivalent: None/denies angina   NTG use: Reviewed Proper use    Risk Factors   Cholesterol: borderline  Smoking: Never used  HTN: No  DM: No  Obesity: No   Inactivity: active at his garage  Stress:  perceived  stress: 1/10   Stressors:health   Goals for Stress Management:Improve Time Management and Keep a positive mindset    Family History:  Family History   Problem Relation Age of Onset    Hypertension Family     Heart disease Family     Stroke Family     No Known Problems Mother     No Known Problems Father        Allergies: Patient has no known allergies    ETOH:   Social History     Substance and Sexual Activity   Alcohol Use Yes    Comment: social          Current Medications:   Current Outpatient Medications   Medication Sig Dispense Refill    Apoaequorin (PREVAGEN PO) Take by mouth      aspirin 81 mg chewable tablet Chew 1 tablet (81 mg total) daily 30 tablet 11    atorvastatin (LIPITOR) 40 mg tablet Take 1 tablet (40 mg total) by mouth every evening 30 tablet 3    carvedilol (COREG) 6 25 mg tablet Take 1 tablet (6 25 mg total) by mouth every 12 (twelve) hours 60 tablet 4    clopidogrel (PLAVIX) 75 mg tablet Take 1 tablet (75 mg total) by mouth daily 30 tablet 11    FOLBIC 2 5-25-2 MG Take 1 tablet by mouth daily  4    isosorbide mononitrate (IMDUR) 30 mg 24 hr tablet Take 1 tablet (30 mg total) by mouth daily 30 tablet 11    niacin (NIASPAN) 1000 MG CR tablet daily   3    nitroglycerin (NITROSTAT) 0 4 mg SL tablet Place 1 tablet (0 4 mg total) under the tongue every 5 (five) minutes as needed for chest pain 25 tablet 11    omega-3-acid ethyl esters (LOVAZA) 1 g capsule Take 2 g by mouth 2 (two) times a day       No current facility-administered medications for this visit  Functional Status Prior to Diagnosis for Treatment   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  Recreation: mows his lawn, cleans garage, booking keeping for garage, harvest fruit from his trees  ADLs: able to perform self-care  [place]: able to perform self-care  Exercise: none  Other: states he is active enough at work and caring for yard    Current Functional Status  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  Recreation: mows his lawn, cleans garage, booking keeping for garage, harvest fruit from his trees  ADLs: able to perform self-care  [place]: able to perform self-care  Exercise: none  Other: states he is active enough at work and caring for yard  Patient Specific Goals:  Attend cardiac rehab    Short Term Program Goals: He states he has no goals except to attend cardiac rehab    Long Term Goals: attend cardiac rehabiliation    Ability to reach goals/rehabilitation potential:  Good    Projected return to function: 8-12 weeks  Objective tests: sub-max TM ETT      Nutritional   Reviewed details of Rate your Plate  Discussed key elements of heart healthy eating  Reviewed patient goals for dietary modifications and their clinical implications    Reviewed most recent lipid profile  Goals for dietary modification: not interested in dietary changes at this time      Emotional/Social  Patient has a history of depression  Reports sufficient emotional support  Dementia, strong family support  Daughter Hawa Many aware of his participation in the cardiac rehabilitation program      Marital status:     Domestic Violence Screening: states he feels safe and free of harm    Comments: initial evaluation completed

## 2021-07-27 NOTE — PROGRESS NOTES
Speech-Language Pathology Re-Evaluation    Today's date: 2021  Patients name: Didi Schaefer  : 1940  MRN: [id number]  Safety measures: Dementia  Referring provider: Yann Koenig MD    Primary diagnosis/billing code: R 48 8  Secondary diagnosis/billing code: F 03 90    Visit tracking:  -Referring provider: Epic  -Billing guidelines: CMS  -Visit #1/10 (13 Total)    -Insurance: Medicare and [address] due 2021    Subjective comments: "My tractor broke over the weekend "    Patient's goal(s): "To be more stable "    Assessments    The Cognitive Linguistic Quick Test (CLQT) is designed to measure an individuals five cognitive domains (attention, memory, executive functions, language, and visuospatial skills)  This norm-referenced tool has been standardized on adults ages 25 through 80years old with neurological impairment as a result of CVA, TBI, or dementia  The following results were obtained during the administration of the assessment  Cognitive Domain: Score: Range of Severity: IE: Status:   Attention 146/215 Mild 144 IMPROVEMENT   Memory 133/185 Mild 137 DECLINE   Executive Functions 11/40 Moderate 11 NO CHANGE   Language  25/ Mild 24 IMPROVEMENT   Visuospatial Skills  58/105 Mild 61 DECLINE          *Composite Severity Ratin 8/4 0  Mild 2 6 IMPROVEMENT                 Clock Drawin/13 WNL 11 IMPROVEMENT     IE indicates the scores from the initial evaluation (2021)  Patient scored below Criteron Cut Scores on the following subtests: Story Retelling, Generative Naming, Mazes and Design Generation  *Patient named 8 concrete category members (animals) in 60 sec (norm=15+)  -- BELOW AVERAGE    *Patient named 4 abstract category members (words beginning with letter 'm') in 60 sec (norm=10+)  -- BELOW AVERAGE    Goals    Short Term Goals  1   Patient will be educated on the use of internal and external memory aids and compensatory strategies with 80% accuracy to facilitate increased recall of routine, personal information, and recent events, to be achieved in 4-6 weeks  --MET    2  Patient will complete auditory immediate and short term memory tasks to 80% accuracy to facilitate increased ability to retell narratives and recall information within functional living environment, to be achieved in 4-6 weeks  --PARTIALLY MET    3  Patient will complete complex auditory attention processing tasks (e g , sentence unscramble, ranking numbers/words, etc ) to improve working memory with 80% accuracy, to be achieved in 4-6 weeks  --PARTIALLY MET    4  Patient will facilitate planning by completing thought organization tasks (e g , sequencing, deduction puzzles, etc ) with 80% accuracy to facilitate increased executive functioning, working memory, problem solving, and processing skills, to be achieved in 4-6 weeks  --PARTIALLY MET    To target divided attention, the card game "BLINK" was introduced (shapes/colors/numbers)  Patient was asked to separate cards into 6 piles according to shape  To target divided attention, he was asked to say the color of the shape while matching it to the same shape pile  Patient found task difficult at first, requiring extra time  Task completed to 100% acc  Patient sorted cards into 6 piles based on their color  To target divided attention, he was asked to say the shape aloud while matching it to its same color  Patient found this activity harder, and made a few mistakes at first  Additional time provided with min cues and patient completed to 100% acc  5  Patient will complete concrete and abstract categorization tasks to 80% accuracy to facilitate improved generative naming skills and working memory, to be achieved in 4-6 weeks  --PARTIALLY MET    6  Patient will utilize word finding strategies during semantic feature analysis treatment activity for improved naming and verbal expression skills  --PARTIALLY MET    Long Term Goals    1  Patient will demonstrate cognitive-communication skills consistent with age and education given use of compensatory strategies when needed to resume baseline activities and responsibilities in home, community, and work/school settings by discharge  --PARTIALLY MET    2  Patient will complete cognitive-linguistic therapy that addresses patients specific deficits in processing speed, short-term working memory, attention to detail, monitoring, sequencing, and organization skills, with instruction, to alleviate effects of executive functioning disorder deficits by discharge  --PARTIALLY MET    3  Patient will complete higher-level expressive language tasks (e g , word definitions, idioms, synonym/antonyms, etc) with 80% accuracy to improve functional communication skills by discharge  --PARTIALLY MET    Impressions/Recommendations    Impressions:   -Patient presents with moderate cognitive-linguistic deficits characterized by decreased short term memory, attention, and executive functions secondary to Dementia  Patient has demonstrated strengths in organization throughout therapy sessions thus far  Patient keeps all his doctor's information in a file folder with labels  Patient also utilizes post it notes to remind himself of time and location of appointments  Patient usually begins the session by reviewing what appointments he has coming up next  This appears to help keep patient on track  He also keeps his appointments on a calendar  Patient demonstrated increases in 2/5 cognitive domains during today's testing and improvement on the overall score  At this time, it is recommended that Patient participate in cognitive-linguistic therapy 1X a week to increase cognitive-linguistic skills and learn compensatory strategies for memory and attention  It should be noted that Patient will also begin with a clinical trial taking place in [place], [place] for a new drug to help with Dementia symptoms    Prognosis is good based on familial support and Patient motivation       Recommendations:  -Patient would benefit from outpatient skilled Speech Therapy services : Cognitive-Linguistic therapy    -Frequency: 1x weekly  -Duration: 6-8 weeks    -Intervention certification from: 9/78/0280  -Intervention certification to: 14/28/0118    -Intervention comments:   40 minutes test administration, 30 minutes scoring and documentation of POC  20 minutes therapy activities

## 2021-07-27 NOTE — PROGRESS NOTES
Cardiac Rehabilitation Plan of Care   Initial Care Plan          Today's date: 2021   # of Exercise Sessions Completed: Initial  Patient name: Nicki Polanco      : 1940  Age: [de-identified] y o  MRN: [id number]  Referring Physician: Domi Stanley MD  Cardiologist: Ivonne Cain  Provider: [facility]  Clinician: Kaylie Mauricio RN    Dx:   Encounter Diagnosis   Name Primary?  CAD S/P percutaneous coronary angioplasty      Date of onset: 2021      SUMMARY OF PROGRESS:  Completed initial evaluation  Explained cardiac rehabilitation, cardiac risk factors, how the heart functions, heart healthy diet  and exercise  Completed sub max treadmill test  Obtained BMI and waist measurements  Telemetry monitor NSR at rest and with exercise  Exercise MET level 3 08 for the sub max treadmill test  RPE 4-7  Normal hemodynamic response to exercise  Complained of shortness of breath and dizziness post test  Denied any complaint of chest discomfort  Provided him with handbook for cardiac rehabilitation  Patient has dementia  Spoke with daughter Marilu Al  She is aware patient will be participating in the cardiac rehabilitation program  He often needs redirection in conversation due to lack of concentration  He plans to attend cardiac rehabilitation regularly  He states he is active at home  Will continue to monitor               Medication compliance: Yes   Comments: Pt reports to be compliant with medications  Fall Risk: Moderate   Comments: Ambulates with a steady gait with no assist device, Denies a fall in the past 6 months and dizziness and shortness of breath at times    EKG Interpretation: NSR      EXERCISE ASSESSMENT and PLAN    Current Exercise Program in Rehab:       Frequency: 1 days/week       Minutes: 6         METS: 3 08 during sub max treadmill test          HR: 62-84   RPE: 4-7         Modalities: Treadmill      Exercise Progression 30 Day Goals :    Frequency: 2-3 days/week of cardiac rehab Supplement with home exercise 2+ days/wk as tolerated    Minutes: 31-45                            >150 mins/wk of moderate intensity exercise   METS: 2 5-4 8   HR: 62-92    RPE: 4-6   Modalities: Treadmill, UBE, NuStep and Recumbent bike    Strength trainin-3 days / week  12-15 repetitions  1-2 sets per modality   Will be added following 2-3 weeks of monitored exercise sessions   Modalities: Lateral Raise, Arm Curl, Sit to Stands, Upright Rows, Front Raises and Shoulder Shrugs    Home Exercise: none    Goals: Attend Rehab regularly    Progression Toward Goals:  Reviewed Pt goals and determined plan of care, Will continue to educate and progress as tolerated  Education: benefit of exercise for CAD risk factors, home exercise guidelines, RPE scale and class: Risk Factors for Heart Disease   Plan:home exercise 30+ mins 2 days opposite CR  Readiness to change: Pre-Contemplation:   (Not yet acknowledging that there is a problem behavior that needs to change)      NUTRITION ASSESSMENT AND PLAN    Weight control:    Starting weight: 148   Current weight:     Waist circumference:    Startin   Current:      Diabetes: N/A  A1c: 5 6    last measured: 2021    Lipid management: Discussed diet and lipid management and Last lipid profile 2021  Chol 186  TRG 52  HDL 55      Goals:patient not interested in dieatry changes at this time    Progression Toward Goals: Reviewed Pt goals and determined plan of care, Will continue to educate and progress as tolerated      Education: heart healthy eating  hydration  Plan: will encourage patient to consume heart healthy diet  Readiness to change: Pre-Contemplation:   (Not yet acknowledging that there is a problem behavior that needs to change)      PSYCHOSOCIAL ASSESSMENT AND PLAN    Emotional:  Depression assessment:  PHQ-9 = 5-9 = Mild Depression            Anxiety measure:  NUNU-7 = 0-4  = Not anxious  Self-reported stress level:  1  slightly worried about his health, he states he has memory issues  Social support: Very Good, Patient reports excellent emotional/social support from family and Patient has friends available for support when needed     Goals:  patient does not have goals at this time    Progression Toward Goals: Reviewed Pt goals and determined plan of care, Will continue to educate and progress as tolerated  Education: signs/sxs of depression, benefits of a positive support system, stress management techniques and depression and CAD  Plan: Keep a positive mindset and Enjoy family  Readiness to change: Action:  (Changing behavior)      OTHER CORE COMPONENTS     Tobacco:   Social History     Tobacco Use   Smoking Status Former Smoker    Types: Cigarettes    Quit date: 1964    Years since quittin 6   Smokeless Tobacco Never Used       Tobacco Use Intervention:   N/A:  Patient is a non-smoker     Anginal Symptoms:  None   NTG use: Reviewed Proper use and Pt has not used NTG since event    Blood pressure:    Restin/62   Exercise: 128/74    Goals: medication compliance    Progression Toward Goals: Reviewed Pt goals and determined plan of care, Will continue to educate and progress as tolerated      Education:  discussed heart healthy diet  Plan: Avoid Processed foods and engage in regular exercise  Readiness to change: Pre-Contemplation:   (Not yet acknowledging that there is a problem behavior that needs to change)

## 2021-08-03 ENCOUNTER — APPOINTMENT (OUTPATIENT)
Dept: SPEECH THERAPY | Facility: CLINIC | Age: 81
End: 2021-08-03
Payer: MEDICARE

## 2021-08-04 ENCOUNTER — CLINICAL SUPPORT (OUTPATIENT)
Dept: CARDIAC REHAB | Facility: CLINIC | Age: 81
End: 2021-08-04
Payer: MEDICARE

## 2021-08-04 DIAGNOSIS — Z98.61 PERCUTANEOUS TRANSLUMINAL CORONARY ANGIOPLASTY STATUS: ICD-10-CM

## 2021-08-04 PROCEDURE — 93798 PHYS/QHP OP CAR RHAB W/ECG: CPT

## 2021-08-06 ENCOUNTER — CLINICAL SUPPORT (OUTPATIENT)
Dept: CARDIAC REHAB | Facility: CLINIC | Age: 81
End: 2021-08-06
Payer: MEDICARE

## 2021-08-06 ENCOUNTER — APPOINTMENT (OUTPATIENT)
Dept: SPEECH THERAPY | Facility: CLINIC | Age: 81
End: 2021-08-06
Payer: MEDICARE

## 2021-08-06 DIAGNOSIS — I50.42 CHRONIC COMBINED SYSTOLIC AND DIASTOLIC HEART FAILURE (HCC): ICD-10-CM

## 2021-08-06 DIAGNOSIS — Z98.61 POSTSURGICAL PERCUTANEOUS TRANSLUMINAL CORONARY ANGIOPLASTY STATUS: ICD-10-CM

## 2021-08-06 PROCEDURE — 93798 PHYS/QHP OP CAR RHAB W/ECG: CPT

## 2021-08-09 ENCOUNTER — CLINICAL SUPPORT (OUTPATIENT)
Dept: CARDIAC REHAB | Facility: CLINIC | Age: 81
End: 2021-08-09
Payer: MEDICARE

## 2021-08-09 DIAGNOSIS — Z98.61 POSTSURGICAL PERCUTANEOUS TRANSLUMINAL CORONARY ANGIOPLASTY STATUS: ICD-10-CM

## 2021-08-09 PROCEDURE — 93798 PHYS/QHP OP CAR RHAB W/ECG: CPT

## 2021-08-10 ENCOUNTER — APPOINTMENT (OUTPATIENT)
Dept: SPEECH THERAPY | Facility: CLINIC | Age: 81
End: 2021-08-10
Payer: MEDICARE

## 2021-08-10 NOTE — PROGRESS NOTES
Patient and Therapist spoke via phone call on 08/10/2021 regarding scheduling  Patient had a death in the family and will be traveling to [place] this coming week  Patient will then be in [place] for a family reunion for another week  Patient requested that he "take a break" from therapy and resume in September  Patient will be placed on a 30-Day Hold at this time  If Therapist does not hear from Patient by 09/10/2021, Therapist will reach out to discuss

## 2021-08-11 ENCOUNTER — APPOINTMENT (OUTPATIENT)
Dept: CARDIAC REHAB | Facility: CLINIC | Age: 81
End: 2021-08-11
Payer: MEDICARE

## 2021-08-13 ENCOUNTER — CLINICAL SUPPORT (OUTPATIENT)
Dept: CARDIAC REHAB | Facility: CLINIC | Age: 81
End: 2021-08-13
Payer: MEDICARE

## 2021-08-13 ENCOUNTER — APPOINTMENT (OUTPATIENT)
Dept: CARDIAC REHAB | Facility: CLINIC | Age: 81
End: 2021-08-13
Payer: MEDICARE

## 2021-08-13 DIAGNOSIS — Z98.61 POSTSURGICAL PERCUTANEOUS TRANSLUMINAL CORONARY ANGIOPLASTY STATUS: ICD-10-CM

## 2021-08-13 PROCEDURE — 93798 PHYS/QHP OP CAR RHAB W/ECG: CPT

## 2021-08-17 ENCOUNTER — APPOINTMENT (OUTPATIENT)
Dept: SPEECH THERAPY | Facility: CLINIC | Age: 81
End: 2021-08-17
Payer: MEDICARE

## 2021-08-18 ENCOUNTER — CLINICAL SUPPORT (OUTPATIENT)
Dept: CARDIAC REHAB | Facility: CLINIC | Age: 81
End: 2021-08-18
Payer: MEDICARE

## 2021-08-18 DIAGNOSIS — Z98.61 POSTSURGICAL PERCUTANEOUS TRANSLUMINAL CORONARY ANGIOPLASTY STATUS: ICD-10-CM

## 2021-08-18 PROCEDURE — 93798 PHYS/QHP OP CAR RHAB W/ECG: CPT

## 2021-08-20 ENCOUNTER — CLINICAL SUPPORT (OUTPATIENT)
Dept: CARDIAC REHAB | Facility: CLINIC | Age: 81
End: 2021-08-20
Payer: MEDICARE

## 2021-08-20 DIAGNOSIS — Z98.61 PERCUTANEOUS TRANSLUMINAL CORONARY ANGIOPLASTY STATUS: ICD-10-CM

## 2021-08-20 PROCEDURE — 93798 PHYS/QHP OP CAR RHAB W/ECG: CPT

## 2021-08-20 NOTE — PROGRESS NOTES
Cardiac Rehabilitation Plan of Care   30 Day Reassessment          Today's date: 2021   # of Exercise Sessions Completed: 7  Patient name: Nicki Polanco      : 1940  Age: [de-identified] y o  MRN: [id number]  Referring Physician: Bety Stanley  Cardiologist: Ivonne Cain  Provider: [facility]  Clinician: Kaylie Mauricio RN    Dx:   Encounter Diagnosis   Name Primary?  Percutaneous transluminal coronary angioplasty status      Date of onset: 2021      SUMMARY OF PROGRESS:  Mr Susan Kim completed 7 sessions of the cardiac rehabilitation program  Explained cardiac risk factors, how the heart functions, heart healthy diet  and exercise  He completes 32 to 34 minutes of cardiovascular exercise  He has a normal hemodynamic response to exercise  Telemetry monitor NSR at rest and with exercise  Exercise MET level 2 7-2 9 MET's  RPE 4-7  Normal hemodynamic response to exercise  Complained of shortness of breath, back pain and dizziness at times  He complained of chest discomfort 2/10 during recumbent bike  Discomfort subsided without intervention    Provided him with handbook for cardiac rehabilitation  Patient has dementia  Spoke with daughter Marilu Al  She is aware patient will be participating in the cardiac rehabilitation program  He often needs redirection in conversation due to lack of concentration  He plans to attend cardiac rehabilitation regularly  He states he is active at home  Will continue to monitor               Medication compliance: Yes   Comments: Pt reports to be compliant with medications  Fall Risk: Moderate   Comments: Ambulates with a steady gait with no assist device, Denies a fall in the past 6 months and dizziness and shortness of breath at times    EKG Interpretation: NSR      EXERCISE ASSESSMENT and PLAN    Current Exercise Program in Rehab:       Frequency: 1-3days/week       Minutes: 32-34         METS: 2 7-2 9          HR:    RPE: 3-5         Modalities: Treadmill, NuStep and Recumbent bike      Exercise Progression 30 Day Goals :    Frequency: 2-3 days/week of cardiac rehab     Supplement with home exercise 2+ days/wk as tolerated    Minutes: 31-45                            >150 mins/wk of moderate intensity exercise   METS: 2 5-4 8   HR:     RPE: 4-6   Modalities: Treadmill, UBE, NuStep and Recumbent bike    Strength trainin-3 days / week  12-15 repetitions  1-2 sets per modality   Will be added following 2-3 weeks of monitored exercise sessions   Modalities: Lateral Raise, Arm Curl, Sit to Stands, Upright Rows, Front Raises and Shoulder Shrugs    Home Exercise: none    Goals: Attend Rehab regularly    Progression Toward Goals:  Reviewed Pt goals and determined plan of care, Will continue to educate and progress as tolerated  Education: benefit of exercise for CAD risk factors, home exercise guidelines, RPE scale and class: Risk Factors for Heart Disease   Plan:home exercise 30+ mins 2 days opposite CR  Readiness to change: Pre-Contemplation:   (Not yet acknowledging that there is a problem behavior that needs to change)      NUTRITION ASSESSMENT AND PLAN    Weight control:    Starting weight: 148   Current weight:   144  Waist circumference:    Startin   Current:      Diabetes: N/A  A1c: 5 6    last measured: 2021    Lipid management: Discussed diet and lipid management and Last lipid profile 2021  Chol 186  TRG 52  HDL 55      Goals:patient not interested in dieatry changes at this time    Progression Toward Goals: Reviewed Pt goals and determined plan of care, Will continue to educate and progress as tolerated      Education: heart healthy eating  hydration  Plan: will encourage patient to consume heart healthy diet  Readiness to change: Pre-Contemplation:   (Not yet acknowledging that there is a problem behavior that needs to change)      PSYCHOSOCIAL ASSESSMENT AND PLAN    Emotional:  Depression assessment:  PHQ-9 = 5-9 = Mild Depression            Anxiety measure:  NUNU-7 = 0-4  = Not anxious  Self-reported stress level:  1  slightly worried about his health, he states he has memory issues  Social support: Very Good, Patient reports excellent emotional/social support from family and Patient has friends available for support when needed     Goals:  patient does not have goals at this time    Progression Toward Goals: Reviewed Pt goals and determined plan of care, Will continue to educate and progress as tolerated  Education: signs/sxs of depression, benefits of a positive support system, stress management techniques and depression and CAD  Plan: Keep a positive mindset and Enjoy family  Readiness to change: Action:  (Changing behavior)      OTHER CORE COMPONENTS     Tobacco:   Social History     Tobacco Use   Smoking Status Former Smoker    Types: Cigarettes    Quit date: 1964    Years since quittin 6   Smokeless Tobacco Never Used       Tobacco Use Intervention:   N/A:  Patient is a non-smoker     Anginal Symptoms:  None   NTG use: Reviewed Proper use and Pt has not used NTG since event    Blood pressure:    Restin//74   Exercise: 112//74    Goals: medication compliance    Progression Toward Goals: Reviewed Pt goals and determined plan of care, Will continue to educate and progress as tolerated      Education:  discussed heart healthy diet  Plan: Avoid Processed foods and engage in regular exercise  Readiness to change: Pre-Contemplation:   (Not yet acknowledging that there is a problem behavior that needs to change)

## 2021-08-23 ENCOUNTER — CLINICAL SUPPORT (OUTPATIENT)
Dept: CARDIAC REHAB | Facility: CLINIC | Age: 81
End: 2021-08-23
Payer: MEDICARE

## 2021-08-23 DIAGNOSIS — Z98.61 POSTSURGICAL PERCUTANEOUS TRANSLUMINAL CORONARY ANGIOPLASTY STATUS: ICD-10-CM

## 2021-08-23 PROCEDURE — 93798 PHYS/QHP OP CAR RHAB W/ECG: CPT

## 2021-08-24 ENCOUNTER — APPOINTMENT (OUTPATIENT)
Dept: SPEECH THERAPY | Facility: CLINIC | Age: 81
End: 2021-08-24
Payer: MEDICARE

## 2021-08-25 ENCOUNTER — CLINICAL SUPPORT (OUTPATIENT)
Dept: CARDIAC REHAB | Facility: CLINIC | Age: 81
End: 2021-08-25
Payer: MEDICARE

## 2021-08-25 DIAGNOSIS — Z98.61 POSTSURGICAL PERCUTANEOUS TRANSLUMINAL CORONARY ANGIOPLASTY STATUS: ICD-10-CM

## 2021-08-25 PROCEDURE — 93798 PHYS/QHP OP CAR RHAB W/ECG: CPT

## 2021-08-27 ENCOUNTER — CLINICAL SUPPORT (OUTPATIENT)
Dept: CARDIAC REHAB | Facility: CLINIC | Age: 81
End: 2021-08-27
Payer: MEDICARE

## 2021-08-27 DIAGNOSIS — Z98.61 POSTSURGICAL PERCUTANEOUS TRANSLUMINAL CORONARY ANGIOPLASTY STATUS: ICD-10-CM

## 2021-08-27 PROCEDURE — 93798 PHYS/QHP OP CAR RHAB W/ECG: CPT

## 2021-08-30 ENCOUNTER — CLINICAL SUPPORT (OUTPATIENT)
Dept: CARDIAC REHAB | Facility: CLINIC | Age: 81
End: 2021-08-30
Payer: MEDICARE

## 2021-08-30 DIAGNOSIS — Z98.61 POSTSURGICAL PERCUTANEOUS TRANSLUMINAL CORONARY ANGIOPLASTY STATUS: ICD-10-CM

## 2021-08-30 PROCEDURE — 93798 PHYS/QHP OP CAR RHAB W/ECG: CPT

## 2021-08-31 ENCOUNTER — APPOINTMENT (OUTPATIENT)
Dept: SPEECH THERAPY | Facility: CLINIC | Age: 81
End: 2021-08-31
Payer: MEDICARE

## 2021-09-01 ENCOUNTER — CLINICAL SUPPORT (OUTPATIENT)
Dept: CARDIAC REHAB | Facility: CLINIC | Age: 81
End: 2021-09-01
Payer: MEDICARE

## 2021-09-01 DIAGNOSIS — Z98.61 POSTSURGICAL PERCUTANEOUS TRANSLUMINAL CORONARY ANGIOPLASTY STATUS: ICD-10-CM

## 2021-09-01 PROCEDURE — 93798 PHYS/QHP OP CAR RHAB W/ECG: CPT

## 2021-09-03 ENCOUNTER — APPOINTMENT (OUTPATIENT)
Dept: CARDIAC REHAB | Facility: CLINIC | Age: 81
End: 2021-09-03
Payer: MEDICARE

## 2021-09-08 ENCOUNTER — APPOINTMENT (OUTPATIENT)
Dept: CARDIAC REHAB | Facility: CLINIC | Age: 81
End: 2021-09-08
Payer: MEDICARE

## 2021-09-10 ENCOUNTER — APPOINTMENT (OUTPATIENT)
Dept: SPEECH THERAPY | Facility: CLINIC | Age: 81
End: 2021-09-10
Payer: MEDICARE

## 2021-09-10 ENCOUNTER — APPOINTMENT (OUTPATIENT)
Dept: CARDIAC REHAB | Facility: CLINIC | Age: 81
End: 2021-09-10
Payer: MEDICARE

## 2021-09-13 ENCOUNTER — CLINICAL SUPPORT (OUTPATIENT)
Dept: CARDIAC REHAB | Facility: CLINIC | Age: 81
End: 2021-09-13
Payer: MEDICARE

## 2021-09-13 DIAGNOSIS — Z98.61 POSTSURGICAL PERCUTANEOUS TRANSLUMINAL CORONARY ANGIOPLASTY STATUS: ICD-10-CM

## 2021-09-13 PROCEDURE — 93798 PHYS/QHP OP CAR RHAB W/ECG: CPT

## 2021-09-15 ENCOUNTER — CLINICAL SUPPORT (OUTPATIENT)
Dept: CARDIAC REHAB | Facility: CLINIC | Age: 81
End: 2021-09-15
Payer: MEDICARE

## 2021-09-15 DIAGNOSIS — Z98.61 POSTSURGICAL PERCUTANEOUS TRANSLUMINAL CORONARY ANGIOPLASTY STATUS: ICD-10-CM

## 2021-09-15 PROCEDURE — 93798 PHYS/QHP OP CAR RHAB W/ECG: CPT

## 2021-09-17 ENCOUNTER — EVALUATION (OUTPATIENT)
Dept: SPEECH THERAPY | Facility: CLINIC | Age: 81
End: 2021-09-17
Payer: MEDICARE

## 2021-09-17 ENCOUNTER — CLINICAL SUPPORT (OUTPATIENT)
Dept: CARDIAC REHAB | Facility: CLINIC | Age: 81
End: 2021-09-17
Payer: MEDICARE

## 2021-09-17 DIAGNOSIS — R48.8 OTHER SYMBOLIC DYSFUNCTIONS: Primary | ICD-10-CM

## 2021-09-17 DIAGNOSIS — Z98.61 POSTSURGICAL PERCUTANEOUS TRANSLUMINAL CORONARY ANGIOPLASTY STATUS: ICD-10-CM

## 2021-09-17 DIAGNOSIS — F03.90 DEMENTIA WITHOUT BEHAVIORAL DISTURBANCE, UNSPECIFIED DEMENTIA TYPE (HCC): ICD-10-CM

## 2021-09-17 PROCEDURE — 92507 TX SP LANG VOICE COMM INDIV: CPT

## 2021-09-17 PROCEDURE — 93798 PHYS/QHP OP CAR RHAB W/ECG: CPT

## 2021-09-17 PROCEDURE — 92523 SPEECH SOUND LANG COMPREHEN: CPT

## 2021-09-17 NOTE — PROGRESS NOTES
Speech-Language Pathology Re-Evaluation    Today's date: 2021  Patients name: Marky Fernandes  : 1940  MRN: [id number]  Safety measures: Dementia  Referring provider: Marybel Silva MD    Primary diagnosis/billing code: R 48 8  Secondary diagnosis/billing code: F 03 90    Visit tracking:  -Referring provider: Epic  -Billing guidelines: CMS  -Visit #1/10 (14 Total)    -Insurance: Medicare and [address] [facility] [address] due 10/17/2021    Subjective comments: "I am going to take that new one from [place] " (Patient has been chosen for Clinical trial taking place in [place], [place])  Patient's goal(s): "To be more stable "    Assessments    Mini-Mental State Examination (MMSE)     The MMSE is a brief, quantitative measure of cognitive status in adults  It can be used to screen for cognitive  impairment, to estimate the severity of cognitive impairment at a given point in time, to follow the course of cognitive changes in an individual over time, and to document an individual's response to treatment  The MMSE includes tests of orientation, attention, memory, language, and visuospatial skills  *Score: 21/30    Goals    Short Term Goals  2  Patient will complete auditory immediate and short term memory tasks to 80% accuracy to facilitate increased ability to retell narratives and recall information within functional living environment, to be achieved in 4-6 weeks  --PARTIALLY MET    3  Patient will complete complex auditory attention processing tasks (e g , sentence unscramble, ranking numbers/words, etc ) to improve working memory with 80% accuracy, to be achieved in 4-6 weeks  --PARTIALLY MET    4  Patient will facilitate planning by completing thought organization tasks (e g , sequencing, deduction puzzles, etc ) with 80% accuracy to facilitate increased executive functioning, working memory, problem solving, and processing skills, to be achieved in 4-6 weeks   --PARTIALLY MET    To target attention and following complex directions, patient was introduced to new learning task, "Math Coding" (Day One)  Patient must follow along a sequence of symbols, using a "key" as reference to the math code  Patient completed this complex direction following task with min verbal cues needed throughout  Task completed with errors x 5 to complete to 100% acc  Time: 14:06    To target thought organization: Patient played "Free Flow" game on clinic iPad where he was instructed to make connections between colored dots  Patient played 5X5 levels 11-15  Patient demonstrated proficiency at this game without the need for verbal cues  5  Patient will complete concrete and abstract categorization tasks to 80% accuracy to facilitate improved generative naming skills and working memory, to be achieved in 4-6 weeks  --PARTIALLY MET    6  Patient will utilize word finding strategies during semantic feature analysis treatment activity for improved naming and verbal expression skills  --PARTIALLY MET    Long Term Goals    1  Patient will demonstrate cognitive-communication skills consistent with age and education given use of compensatory strategies when needed to resume baseline activities and responsibilities in home, community, and work/school settings by discharge  --PARTIALLY MET    2  Patient will complete cognitive-linguistic therapy that addresses patients specific deficits in processing speed, short-term working memory, attention to detail, monitoring, sequencing, and organization skills, with instruction, to alleviate effects of executive functioning disorder deficits by discharge  --PARTIALLY MET    3  Patient will complete higher-level expressive language tasks (e g , word definitions, idioms, synonym/antonyms, etc) with 80% accuracy to improve functional communication skills by discharge   --PARTIALLY MET    Impressions/Recommendations    Impressions:   -Patient presents with moderate cognitive-linguistic deficits characterized by decreased short term memory, attention, and executive functions secondary to Dementia  Patient has demonstrated strengths in organization throughout therapy sessions thus far  Patient completed "Math Coding" task during today's session  Although, Patient initial needed mod to max verbal cues to complete, once Patient completed this first line, he was able to finish puzzle with little to no assistance  Patient keeps all his doctor's information in a file folder with labels  Patient also utilizes post it notes to remind himself of time and location of appointments  Patient usually begins the session by reviewing what appointments he has coming up next  This appears to help keep patient on track  He also keeps his appointments on a calendar  Patient scored a 21/30 on today's MMSE  At this time, it is recommended that Patient participate in cognitive-linguistic therapy 1X a week to increase cognitive-linguistic skills and learn compensatory strategies for memory and attention  It should be noted that Patient will also begin with a clinical trial taking place in [address] for a new drug to help with Dementia symptoms  Prognosis is good based on familial support and Patient motivation       Recommendations:  -Patient would benefit from outpatient skilled Speech Therapy services : Cognitive-Linguistic therapy    -Frequency: 1x weekly  -Duration: 6-8 weeks    -Intervention certification from: 1/67/6474  -Intervention certification to: 96/90/6880    -Intervention comments:   25 minutes test administration (MMSE); 35 minutes therapy activities

## 2021-09-17 NOTE — PROGRESS NOTES
Cardiac Rehabilitation Plan of Care   60 Day Reassessment          Today's date: 2021   # of Exercise Sessions Completed:15  Patient name: Elena Hickey      : 1940  Age: [de-identified] y o  MRN: [id number]  Referring Physician: Opal Barton  Cardiologist: Ngozi Ohara  Provider: Yuliet Ron  Clinician: Karla Mauricio RN    Dx:   Encounter Diagnosis   Name Primary?  Postsurgical percutaneous transluminal coronary angioplasty status      Date of onset: 2021      SUMMARY OF PROGRESS:  Mr Naaman Paget completed 15 sessions of the cardiac rehabilitation program  Explained cardiac risk factors, how the heart functions, heart healthy diet  and exercise  He completes 40 minutes of cardiovascular exercise  He has a normal hemodynamic response to exercise  Telemetry monitor NSR at rest and with exercise  Exercise MET level 2 7-3 2 MET's  RPE 3-4  Normal hemodynamic response to exercise  Complained of shortness of breath, back pain and dizziness at times  He complained of chest discomfort 2/10 during recumbent bike  Discomfort subsided without intervention  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 and walks regularly outdoors with his daughter    Provided him with handbook for cardiac rehabilitation  Patient has dementia  Spoke with daughter Ingrid Joe  She is aware patient will be participating in the cardiac rehabilitation program  He often needs redirection in conversation due to lack of concentration  He plans to attend cardiac rehabilitation regularly  He states he is active at home  His weight decreased from 148 to 141 5 in 60 days  Will continue to monitor         Medication compliance: Yes   Comments: Pt reports to be compliant with medications  Fall Risk: Moderate   Comments: Ambulates with a steady gait with no assist device, Denies a fall in the past 6 months and dizziness and shortness of breath at times    EKG Interpretation: NSR      EXERCISE ASSESSMENT and PLAN    Current Exercise Program in Rehab:       Frequency: 1-3 days/week       Minutes: 32-40        METS: 2 7-3 2         HR:    RPE: 3-5         Modalities: Treadmill, NuStep and Recumbent bike      Exercise Progression 30 Day Goals :    Frequency: 2-3 days/week of cardiac rehab     Supplement with home exercise 2+ days/wk as tolerated    Minutes: 31-45                            >150 mins/wk of moderate intensity exercise   METS: 2 5-4 8   HR:     RPE: 4-6   Modalities: Treadmill, UBE, NuStep and Recumbent bike    Strength trainin-3 days / week  12-15 repetitions  1-2 sets per modality   Will be added following 2-3 weeks of monitored exercise sessions   Modalities: Lateral Raise, Arm Curl, Sit to Stands, Upright Rows, Front Raises and Shoulder Shrugs    Home Exercise: none    Goals: Attend Rehab regularly    Progression Toward Goals:  Reviewed Pt goals and determined plan of care, Will continue to educate and progress as tolerated  Education: benefit of exercise for CAD risk factors, home exercise guidelines, RPE scale and class: Risk Factors for Heart Disease   Plan:home exercise 30+ mins 2 days opposite CR  Readiness to change: Pre-Contemplation:   (Not yet acknowledging that there is a problem behavior that needs to change)      NUTRITION ASSESSMENT AND PLAN    Weight control:    Starting weight: 148   Current weight:   141 5  Waist circumference:    Startin   Current:      Diabetes: N/A  A1c: 5 6    last measured: 2021    Lipid management: Discussed diet and lipid management and Last lipid profile 2021  Chol 186  TRG 52  HDL 55      Goals:patient not interested in dieatry changes at this time    Progression Toward Goals: Reviewed Pt goals and determined plan of care, Will continue to educate and progress as tolerated      Education: heart healthy eating  hydration  Plan: will encourage patient to consume heart healthy diet  Readiness to change: Pre-Contemplation:   (Not yet acknowledging that there is a problem behavior that needs to change)      PSYCHOSOCIAL ASSESSMENT AND PLAN    Emotional:  Depression assessment:  PHQ-9 = 5-9 = Mild Depression            Anxiety measure:  NUNU-7 = 0-4  = Not anxious  Self-reported stress level:  1  slightly worried about his health, he states he has memory issues  Social support: Very Good, Patient reports excellent emotional/social support from family and Patient has friends available for support when needed     Goals:  patient does not have goals at this time    Progression Toward Goals: Reviewed Pt goals and determined plan of care, Will continue to educate and progress as tolerated  Education: signs/sxs of depression, benefits of a positive support system, stress management techniques and depression and CAD  Plan: Keep a positive mindset and Enjoy family  Readiness to change: Action:  (Changing behavior)      OTHER CORE COMPONENTS     Tobacco:   Social History     Tobacco Use   Smoking Status Former Smoker    Types: Cigarettes    Quit date: 1964    Years since quittin 7   Smokeless Tobacco Never Used       Tobacco Use Intervention:   N/A:  Patient is a non-smoker     Anginal Symptoms:  None   NTG use: Reviewed Proper use and Pt has not used NTG since event    Blood pressure:    Restin//84   Exercise: 112//84    Goals: medication compliance    Progression Toward Goals: Reviewed Pt goals and determined plan of care, Will continue to educate and progress as tolerated      Education:  discussed heart healthy diet, common heart medications  Plan: Avoid Processed foods and engage in regular exercise  Readiness to change: Pre-Contemplation:   (Not yet acknowledging that there is a problem behavior that needs to change)

## 2021-09-20 ENCOUNTER — APPOINTMENT (OUTPATIENT)
Dept: CARDIAC REHAB | Facility: CLINIC | Age: 81
End: 2021-09-20
Payer: MEDICARE

## 2021-09-22 ENCOUNTER — CLINICAL SUPPORT (OUTPATIENT)
Dept: CARDIAC REHAB | Facility: CLINIC | Age: 81
End: 2021-09-22
Payer: MEDICARE

## 2021-09-22 DIAGNOSIS — Z98.61 POSTSURGICAL PERCUTANEOUS TRANSLUMINAL CORONARY ANGIOPLASTY STATUS: ICD-10-CM

## 2021-09-22 PROCEDURE — 93798 PHYS/QHP OP CAR RHAB W/ECG: CPT

## 2021-09-23 ENCOUNTER — TELEPHONE (OUTPATIENT)
Dept: GERIATRICS | Age: 81
End: 2021-09-23

## 2021-09-23 NOTE — TELEPHONE ENCOUNTER
-Caller requests that recent josefina assessment be faxed to Dr Leopold Bianchi at [phone]  This MR will fax

## 2021-09-24 ENCOUNTER — CLINICAL SUPPORT (OUTPATIENT)
Dept: CARDIAC REHAB | Facility: CLINIC | Age: 81
End: 2021-09-24
Payer: MEDICARE

## 2021-09-24 ENCOUNTER — APPOINTMENT (OUTPATIENT)
Dept: SPEECH THERAPY | Facility: CLINIC | Age: 81
End: 2021-09-24
Payer: MEDICARE

## 2021-09-24 DIAGNOSIS — Z98.61 POSTSURGICAL PERCUTANEOUS TRANSLUMINAL CORONARY ANGIOPLASTY STATUS: ICD-10-CM

## 2021-09-24 PROCEDURE — 93798 PHYS/QHP OP CAR RHAB W/ECG: CPT

## 2021-09-27 ENCOUNTER — CLINICAL SUPPORT (OUTPATIENT)
Dept: CARDIAC REHAB | Facility: CLINIC | Age: 81
End: 2021-09-27
Payer: MEDICARE

## 2021-09-27 DIAGNOSIS — Z98.61 POSTSURGICAL PERCUTANEOUS TRANSLUMINAL CORONARY ANGIOPLASTY STATUS: ICD-10-CM

## 2021-09-27 PROCEDURE — 93798 PHYS/QHP OP CAR RHAB W/ECG: CPT

## 2021-09-29 ENCOUNTER — CLINICAL SUPPORT (OUTPATIENT)
Dept: CARDIAC REHAB | Facility: CLINIC | Age: 81
End: 2021-09-29
Payer: MEDICARE

## 2021-09-29 DIAGNOSIS — Z98.61 POSTSURGICAL PERCUTANEOUS TRANSLUMINAL CORONARY ANGIOPLASTY STATUS: ICD-10-CM

## 2021-09-29 PROCEDURE — 93798 PHYS/QHP OP CAR RHAB W/ECG: CPT

## 2021-10-01 ENCOUNTER — CLINICAL SUPPORT (OUTPATIENT)
Dept: CARDIAC REHAB | Facility: CLINIC | Age: 81
End: 2021-10-01
Payer: MEDICARE

## 2021-10-01 ENCOUNTER — APPOINTMENT (OUTPATIENT)
Dept: SPEECH THERAPY | Facility: CLINIC | Age: 81
End: 2021-10-01
Payer: MEDICARE

## 2021-10-01 DIAGNOSIS — Z98.61 PERCUTANEOUS TRANSLUMINAL CORONARY ANGIOPLASTY STATUS: ICD-10-CM

## 2021-10-01 PROCEDURE — 93798 PHYS/QHP OP CAR RHAB W/ECG: CPT

## 2021-10-04 ENCOUNTER — CLINICAL SUPPORT (OUTPATIENT)
Dept: CARDIAC REHAB | Facility: CLINIC | Age: 81
End: 2021-10-04
Payer: MEDICARE

## 2021-10-04 DIAGNOSIS — Z98.61 POSTSURGICAL PERCUTANEOUS TRANSLUMINAL CORONARY ANGIOPLASTY STATUS: ICD-10-CM

## 2021-10-04 PROCEDURE — 93798 PHYS/QHP OP CAR RHAB W/ECG: CPT

## 2021-10-06 ENCOUNTER — CLINICAL SUPPORT (OUTPATIENT)
Dept: CARDIAC REHAB | Facility: CLINIC | Age: 81
End: 2021-10-06
Payer: MEDICARE

## 2021-10-06 DIAGNOSIS — Z98.61 S/P PTCA (PERCUTANEOUS TRANSLUMINAL CORONARY ANGIOPLASTY): ICD-10-CM

## 2021-10-06 PROCEDURE — 93798 PHYS/QHP OP CAR RHAB W/ECG: CPT

## 2021-10-08 ENCOUNTER — CLINICAL SUPPORT (OUTPATIENT)
Dept: CARDIAC REHAB | Facility: CLINIC | Age: 81
End: 2021-10-08
Payer: MEDICARE

## 2021-10-08 DIAGNOSIS — Z98.61 CAD S/P PERCUTANEOUS CORONARY ANGIOPLASTY: ICD-10-CM

## 2021-10-08 DIAGNOSIS — I25.10 CAD S/P PERCUTANEOUS CORONARY ANGIOPLASTY: ICD-10-CM

## 2021-10-08 PROCEDURE — 93798 PHYS/QHP OP CAR RHAB W/ECG: CPT

## 2021-10-11 ENCOUNTER — TELEPHONE (OUTPATIENT)
Dept: SPEECH THERAPY | Facility: CLINIC | Age: 81
End: 2021-10-11

## 2021-10-11 ENCOUNTER — CLINICAL SUPPORT (OUTPATIENT)
Dept: CARDIAC REHAB | Facility: CLINIC | Age: 81
End: 2021-10-11
Payer: MEDICARE

## 2021-10-11 DIAGNOSIS — I50.42 CHRONIC COMBINED SYSTOLIC AND DIASTOLIC HEART FAILURE (HCC): ICD-10-CM

## 2021-10-11 DIAGNOSIS — Z98.61 POSTSURGICAL PERCUTANEOUS TRANSLUMINAL CORONARY ANGIOPLASTY STATUS: ICD-10-CM

## 2021-10-11 PROCEDURE — 93798 PHYS/QHP OP CAR RHAB W/ECG: CPT

## 2021-10-13 ENCOUNTER — CLINICAL SUPPORT (OUTPATIENT)
Dept: CARDIAC REHAB | Facility: CLINIC | Age: 81
End: 2021-10-13
Payer: MEDICARE

## 2021-10-13 DIAGNOSIS — Z98.61 POSTSURGICAL PERCUTANEOUS TRANSLUMINAL CORONARY ANGIOPLASTY STATUS: ICD-10-CM

## 2021-10-13 PROCEDURE — 93798 PHYS/QHP OP CAR RHAB W/ECG: CPT

## 2021-10-15 ENCOUNTER — EVALUATION (OUTPATIENT)
Dept: SPEECH THERAPY | Facility: CLINIC | Age: 81
End: 2021-10-15
Payer: MEDICARE

## 2021-10-15 ENCOUNTER — CLINICAL SUPPORT (OUTPATIENT)
Dept: CARDIAC REHAB | Facility: CLINIC | Age: 81
End: 2021-10-15
Payer: MEDICARE

## 2021-10-15 DIAGNOSIS — Z98.61 POSTSURGICAL PERCUTANEOUS TRANSLUMINAL CORONARY ANGIOPLASTY STATUS: ICD-10-CM

## 2021-10-15 DIAGNOSIS — R48.8 OTHER SYMBOLIC DYSFUNCTIONS: Primary | ICD-10-CM

## 2021-10-15 DIAGNOSIS — F03.90 DEMENTIA WITHOUT BEHAVIORAL DISTURBANCE, UNSPECIFIED DEMENTIA TYPE (HCC): ICD-10-CM

## 2021-10-15 PROCEDURE — 92507 TX SP LANG VOICE COMM INDIV: CPT

## 2021-10-15 PROCEDURE — 93798 PHYS/QHP OP CAR RHAB W/ECG: CPT

## 2021-10-18 ENCOUNTER — CLINICAL SUPPORT (OUTPATIENT)
Dept: CARDIAC REHAB | Facility: CLINIC | Age: 81
End: 2021-10-18
Payer: MEDICARE

## 2021-10-18 DIAGNOSIS — Z98.61 POSTSURGICAL PERCUTANEOUS TRANSLUMINAL CORONARY ANGIOPLASTY STATUS: ICD-10-CM

## 2021-10-18 PROCEDURE — 93798 PHYS/QHP OP CAR RHAB W/ECG: CPT

## 2021-10-20 ENCOUNTER — CLINICAL SUPPORT (OUTPATIENT)
Dept: CARDIAC REHAB | Facility: CLINIC | Age: 81
End: 2021-10-20
Payer: MEDICARE

## 2021-10-20 DIAGNOSIS — Z98.61 POSTSURGICAL PERCUTANEOUS TRANSLUMINAL CORONARY ANGIOPLASTY (PTCA) STATUS: ICD-10-CM

## 2021-10-20 PROCEDURE — 93798 PHYS/QHP OP CAR RHAB W/ECG: CPT

## 2021-10-22 ENCOUNTER — APPOINTMENT (OUTPATIENT)
Dept: CARDIAC REHAB | Facility: CLINIC | Age: 81
End: 2021-10-22
Payer: MEDICARE

## 2021-10-25 ENCOUNTER — APPOINTMENT (OUTPATIENT)
Dept: CARDIAC REHAB | Facility: CLINIC | Age: 81
End: 2021-10-25
Payer: MEDICARE

## 2021-10-27 ENCOUNTER — CLINICAL SUPPORT (OUTPATIENT)
Dept: CARDIAC REHAB | Facility: CLINIC | Age: 81
End: 2021-10-27
Payer: MEDICARE

## 2021-10-27 DIAGNOSIS — Z98.61 POSTSURGICAL PERCUTANEOUS TRANSLUMINAL CORONARY ANGIOPLASTY STATUS: ICD-10-CM

## 2021-10-27 PROCEDURE — 93798 PHYS/QHP OP CAR RHAB W/ECG: CPT

## 2021-10-29 ENCOUNTER — APPOINTMENT (OUTPATIENT)
Dept: SPEECH THERAPY | Facility: CLINIC | Age: 81
End: 2021-10-29
Payer: MEDICARE

## 2021-10-29 ENCOUNTER — CLINICAL SUPPORT (OUTPATIENT)
Dept: CARDIAC REHAB | Facility: CLINIC | Age: 81
End: 2021-10-29
Payer: MEDICARE

## 2021-10-29 DIAGNOSIS — Z98.61 POSTSURGICAL PERCUTANEOUS TRANSLUMINAL CORONARY ANGIOPLASTY STATUS: ICD-10-CM

## 2021-10-29 PROCEDURE — 93798 PHYS/QHP OP CAR RHAB W/ECG: CPT

## 2021-11-01 ENCOUNTER — CLINICAL SUPPORT (OUTPATIENT)
Dept: CARDIAC REHAB | Facility: CLINIC | Age: 81
End: 2021-11-01
Payer: MEDICARE

## 2021-11-01 DIAGNOSIS — Z98.61 POSTSURGICAL PERCUTANEOUS TRANSLUMINAL CORONARY ANGIOPLASTY STATUS: ICD-10-CM

## 2021-11-01 PROCEDURE — 93798 PHYS/QHP OP CAR RHAB W/ECG: CPT

## 2021-11-03 ENCOUNTER — CLINICAL SUPPORT (OUTPATIENT)
Dept: CARDIAC REHAB | Facility: CLINIC | Age: 81
End: 2021-11-03
Payer: MEDICARE

## 2021-11-03 DIAGNOSIS — Z98.61 POSTSURGICAL PERCUTANEOUS TRANSLUMINAL CORONARY ANGIOPLASTY STATUS: ICD-10-CM

## 2021-11-03 PROCEDURE — 93798 PHYS/QHP OP CAR RHAB W/ECG: CPT

## 2021-11-04 ENCOUNTER — TELEPHONE (OUTPATIENT)
Dept: SPEECH THERAPY | Facility: CLINIC | Age: 81
End: 2021-11-04

## 2021-11-05 ENCOUNTER — APPOINTMENT (OUTPATIENT)
Dept: SPEECH THERAPY | Facility: CLINIC | Age: 81
End: 2021-11-05
Payer: MEDICARE

## 2021-11-05 ENCOUNTER — CLINICAL SUPPORT (OUTPATIENT)
Dept: CARDIAC REHAB | Facility: CLINIC | Age: 81
End: 2021-11-05
Payer: MEDICARE

## 2021-11-05 DIAGNOSIS — Z98.61 POSTSURGICAL PERCUTANEOUS TRANSLUMINAL CORONARY ANGIOPLASTY STATUS: ICD-10-CM

## 2021-11-05 PROCEDURE — 93798 PHYS/QHP OP CAR RHAB W/ECG: CPT

## 2021-11-08 ENCOUNTER — CLINICAL SUPPORT (OUTPATIENT)
Dept: CARDIAC REHAB | Facility: CLINIC | Age: 81
End: 2021-11-08
Payer: MEDICARE

## 2021-11-08 DIAGNOSIS — Z98.61 PERCUTANEOUS TRANSLUMINAL CORONARY ANGIOPLASTY STATUS: ICD-10-CM

## 2021-11-08 PROCEDURE — 93798 PHYS/QHP OP CAR RHAB W/ECG: CPT

## 2021-11-10 ENCOUNTER — CLINICAL SUPPORT (OUTPATIENT)
Dept: CARDIAC REHAB | Facility: CLINIC | Age: 81
End: 2021-11-10
Payer: MEDICARE

## 2021-11-10 ENCOUNTER — APPOINTMENT (OUTPATIENT)
Dept: SPEECH THERAPY | Facility: CLINIC | Age: 81
End: 2021-11-10
Payer: MEDICARE

## 2021-11-10 DIAGNOSIS — Z98.61 POSTSURGICAL PERCUTANEOUS TRANSLUMINAL CORONARY ANGIOPLASTY STATUS: ICD-10-CM

## 2021-11-10 PROCEDURE — 93798 PHYS/QHP OP CAR RHAB W/ECG: CPT

## 2021-11-12 ENCOUNTER — OFFICE VISIT (OUTPATIENT)
Dept: SPEECH THERAPY | Facility: CLINIC | Age: 81
End: 2021-11-12
Payer: MEDICARE

## 2021-11-12 ENCOUNTER — CLINICAL SUPPORT (OUTPATIENT)
Dept: CARDIAC REHAB | Facility: CLINIC | Age: 81
End: 2021-11-12
Payer: MEDICARE

## 2021-11-12 DIAGNOSIS — Z98.61 POSTSURGICAL PERCUTANEOUS TRANSLUMINAL CORONARY ANGIOPLASTY STATUS: ICD-10-CM

## 2021-11-12 DIAGNOSIS — R48.8 OTHER SYMBOLIC DYSFUNCTIONS: Primary | ICD-10-CM

## 2021-11-12 DIAGNOSIS — F03.90 DEMENTIA WITHOUT BEHAVIORAL DISTURBANCE, UNSPECIFIED DEMENTIA TYPE (HCC): ICD-10-CM

## 2021-11-12 PROCEDURE — 93798 PHYS/QHP OP CAR RHAB W/ECG: CPT

## 2021-11-12 PROCEDURE — 92507 TX SP LANG VOICE COMM INDIV: CPT

## 2021-11-16 ENCOUNTER — TELEPHONE (OUTPATIENT)
Dept: OTHER | Facility: OTHER | Age: 81
End: 2021-11-16

## 2021-11-19 ENCOUNTER — TELEPHONE (OUTPATIENT)
Dept: SPEECH THERAPY | Facility: CLINIC | Age: 81
End: 2021-11-19

## 2021-11-19 ENCOUNTER — TELEPHONE (OUTPATIENT)
Dept: GERIATRICS | Age: 81
End: 2021-11-19

## 2022-01-14 ENCOUNTER — HOSPITAL ENCOUNTER (OUTPATIENT)
Dept: NON INVASIVE DIAGNOSTICS | Facility: HOSPITAL | Age: 82
Discharge: HOME | End: 2022-01-14
Attending: SURGERY
Payer: MEDICARE

## 2022-01-14 DIAGNOSIS — R07.9 CHEST PAIN ON EXERTION: ICD-10-CM

## 2022-01-14 DIAGNOSIS — I70.211 ATHEROSCLEROSIS OF NATIVE ARTERY OF RIGHT LOWER EXTREMITY WITH INTERMITTENT CLAUDICATION (HCC): ICD-10-CM

## 2022-01-14 DIAGNOSIS — Z95.1 HX OF CABG: ICD-10-CM

## 2022-01-14 PROCEDURE — 93923 UPR/LXTR ART STDY 3+ LVLS: CPT

## 2022-01-14 PROCEDURE — 93925 LOWER EXTREMITY STUDY: CPT

## 2022-01-17 PROCEDURE — 93922 UPR/L XTREMITY ART 2 LEVELS: CPT | Performed by: SURGERY

## 2022-01-17 PROCEDURE — 93925 LOWER EXTREMITY STUDY: CPT | Performed by: SURGERY

## 2022-01-18 ENCOUNTER — TRANSCRIBE ORDERS (OUTPATIENT)
Dept: VASCULAR SURGERY | Facility: CLINIC | Age: 82
End: 2022-01-18

## 2022-01-18 DIAGNOSIS — I73.9 PAD (PERIPHERAL ARTERY DISEASE) (HCC): Primary | ICD-10-CM

## 2022-02-11 ENCOUNTER — OFFICE VISIT (OUTPATIENT)
Dept: GERIATRICS | Age: 82
End: 2022-02-11
Payer: MEDICARE

## 2022-02-11 VITALS
BODY MASS INDEX: 25.62 KG/M2 | TEMPERATURE: 98.9 F | DIASTOLIC BLOOD PRESSURE: 64 MMHG | HEART RATE: 79 BPM | WEIGHT: 144.6 LBS | OXYGEN SATURATION: 96 % | HEIGHT: 63 IN | SYSTOLIC BLOOD PRESSURE: 116 MMHG | RESPIRATION RATE: 16 BRPM

## 2022-02-11 DIAGNOSIS — I25.118 CORONARY ARTERY DISEASE INVOLVING NATIVE CORONARY ARTERY OF NATIVE HEART WITH OTHER FORM OF ANGINA PECTORIS (HCC): ICD-10-CM

## 2022-02-11 DIAGNOSIS — K59.09 OTHER CONSTIPATION: ICD-10-CM

## 2022-02-11 DIAGNOSIS — F33.0 MILD EPISODE OF RECURRENT MAJOR DEPRESSIVE DISORDER (HCC): ICD-10-CM

## 2022-02-11 DIAGNOSIS — F03.90 DEMENTIA WITHOUT BEHAVIORAL DISTURBANCE, UNSPECIFIED DEMENTIA TYPE (HCC): Primary | ICD-10-CM

## 2022-02-11 DIAGNOSIS — I73.9 PAD (PERIPHERAL ARTERY DISEASE) (HCC): ICD-10-CM

## 2022-02-11 DIAGNOSIS — H91.93 DECREASED HEARING OF BOTH EARS: ICD-10-CM

## 2022-02-11 PROCEDURE — 99483 ASSMT & CARE PLN PT COG IMP: CPT | Performed by: STUDENT IN AN ORGANIZED HEALTH CARE EDUCATION/TRAINING PROGRAM

## 2022-02-11 NOTE — PROGRESS NOTES
ASSESSMENT AND PLAN:  1  Dementia without behavioral disturbance, unspecified dementia type ([facility] [address] )  Assessment & Plan:  [facility], [address] 15/30 (prev 16/30), pHQ neg, TUGT 10sec  Patient with deficits in visual spatial, executive function, attention, language, abstraction, memory, delayed recall and orientation domains  Prior MRI neuroquant showing cerebral volume loss slightly more prominent within the left temporal lobe than the right with mild asymmetry in the hippocampus and adjacent inferior lateral ventricle, as   well as the surrounding temporal lobe  NeuroQuant analysis was performed: Low hippocampal volume and enlargement of the adjacent inferior lateral venricles suggestive of local ex-vacuo dilatation  In addition there was an abnormal hippocampal occupancy score  Findings support a medial temporal lobe focused neurodegenerative etiology  Patient previously enrolled in speech therapy however had discontinued going as he felt it was becoming overwhelming  Daughter requesting speech therapy for cognitive rehabilitation for twice monthly scheduling, referral placed  Patient remains independent in all ADLs and IADLs  No safety concerns at home or with driving  Mood remains stable, no personality changes  Previously discussed cholinesterase therapy which was declined by family  Patient has enrolled into a clinical trials with a [employer] team  Currently the patient remains at a level of mild dementia  Reorientation redirection as needed  Manage chronic conditions  Maintain [place] precautions  Remain active mentally, physically and socially  Participate in cognitively challenging exercises as able  Consider cognitively stimulating online apps  Will follow-up in 1 year    Orders:  -     Ambulatory Referral to Speech Therapy; Future    2   Other constipation  Assessment & Plan:  Stable per patient  Encourage increased fiber intake  Patient previously on Dulcolax, Colace  Follow-up with PCP for continued monitoring      3  PAD (peripheral artery disease) ([facility])  Assessment & Plan:  History of right leg claudication and asymptomatic bilateral carotid artery stenosis  Following  with vascular surgery  Asymptomatic   Continue atorvastatin, aspirin      4  Mild episode of recurrent major depressive disorder ([facility] [address] )  Assessment & Plan:  pHQ negative  Patient successfully weaned off paroxetine given its anticholinergic side effects in elderly patients  Patient denies any HI/SI  Continued social support by family and friends      5  Decreased hearing of both ears  Assessment & Plan:  Patient with decreased hearing  Non compliant with use of his hearing aids  Educated patient on importance of being compliant with his hearing aids given his cognitive deficits  Recommend facing patient directly and standing in close proximity when communicating to avoid confusion  Maintain [place] precautions      6  Coronary artery disease involving native coronary artery of native heart with other form of angina pectoris ([facility] [address] )  Assessment & Plan:  Stable  Patient denies any cardiorespiratory distress  Continue aspirin, atorvastatin, carvedilol, Imdur, nitroglycerin as needed  Follow-up with Cardiology as scheduled  Recommend adherence to a heart healthy diet        Decision-making capacity:  Patient should not make any important medical or financial decisions independently without the presence of his POA as assessed during this visit    Staging:  Mild dementia ( FAST stage 4)    Medications Review:  Medications appropriate for chronic conditions      HPI:    We had the pleasure of re-evaluating Hetal Hussein who is a [de-identified] y o  male in Geriatric follow up today  Mr Inna Mendez is in the office with his daughter Susan Lanfgord  Today daughter explains that the patient does continue to have short-term memory loss however he remains independent in all ADLs and IADLs  The patient was positive for COVID-19 last year    No significant changes in mood, personality or behavior  The patient continues to drive without any concerns nor have there been any safety concerns noted at home  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 as he feels it is becoming too much as he is getting older  Daughter explains that the patient has enrolled into a clinical trials with a [employer] which he has been tolerating to date  Patient does continue to reside with his 72-year-old mother-in-law who has a 24/7 home caregiver  He typically will eat out does not cook  Patient has not had any recent falls and continues to ambulate without any assistive devices  He does have decreased hearing however has been noncompliant with use of his hearing aids  Patient was educated on the importance of complying with his hearing aids given his cognitive impairment  Advanced directives are in place and patient's daughter continues to be a significant support to the patient  At prior care plan conference, we did discuss trialing cholinesterase inhibitor therapy which was declined  Given patient's current enrollment in a clinical trials, will plan to follow up in 1 year or earlier if needed  No cardiorespiratory distress, fever, chills, URI or urinary symptoms  The patient states he has been tolerating oral intake, sleeping well and having regular bowel movements on his current bowel regimen          COGNITION:  Memory Issues noticed since 3-4 years   Memory affected: short term memory loss    Symptoms started: gradual  Over time the memory has:  worsened  Memory issue(s) were noted by: patient and family   Patient has difficulties with recalling short term events  Difficulty finding the right word while speaking: Yes  Requires repeat information or asking the same question repeatedly: Yes  Fluctuation in alertness: No  Changes in mood or personality:No  Current or previous treatment for depression or anxiety: No    Family member with dementia and what type? no  History of head trauma: No  History of stroke: No  History of alcohol or substance abuse: No    FUNCTIONAL STATUS:  BADLs  Does patient require assistance with:  Bathing: No  Dressing: No  Toileting: No  Transferring: No  Continence: No  Feeding: No    IADLs  Dose patient require assistance with:  Telephone: No  Shopping: No  Food Preparation: No  Housekeeping: No  Laundry: No  Transportation: No  Medications: No  Finances: No    NEUROPSYCH SYMPTOMS:  Does patient get angry or hostile? Resist care from others? No  Does patient see or hear things that no one else can see or hear? No  Does patient act impatient and cranky? Does mood frequently change for no apparent reason? No  Does patient act suspicious without good reason (example: believes that others are stealing from him or her, or planning to harm him or her in some way)? No  Does patient less interested in his or her usual activities or in the activities and plans of others? No  Does patient have trouble sleeping at night? No  Dose patient have abnormal movements while asleep? No    SAFETY:  Hearing and vision issue: Yes  Any gait or balance disorder: No  Uses: none  Any falls in the last year: No  Any history of wandering: No  Are there firearms or guns in the home: unknown  Does patient drive: Yes  Any driving accidents or citations in the last year: No  Any concerns about patient's ability to drive: No    ACP REVIEW:  Does patient have POA: Yes  Does patient have a Living will: Yes  Any legal assistance needed for healthcare planning?: No    ROS: Review of Systems   Constitutional: Negative for activity change, chills and fever  HENT: Positive for hearing loss  Negative for congestion, ear pain, rhinorrhea and sore throat  Eyes: Negative for discharge  Respiratory: Negative for cough, chest tightness, shortness of breath, wheezing and stridor  Cardiovascular: Negative for chest pain, palpitations and leg swelling  Gastrointestinal: Positive for constipation  Negative for abdominal pain, diarrhea, nausea and vomiting  Genitourinary: Negative for decreased urine volume, dysuria and hematuria  Musculoskeletal: Negative for gait problem  Skin: Negative for color change, pallor, rash and wound  Neurological: Negative for dizziness and light-headedness  Psychiatric/Behavioral: Positive for decreased concentration  Negative for agitation, behavioral problems, confusion and sleep disturbance  The patient is not nervous/anxious  No Known Allergies    Medications:    Current Outpatient Medications on File Prior to Visit   Medication Sig Dispense Refill    Apoaequorin (PREVAGEN PO) Take by mouth      aspirin 81 mg chewable tablet Chew 1 tablet (81 mg total) daily 30 tablet 11    atorvastatin (LIPITOR) 40 mg tablet Take 1 tablet (40 mg total) by mouth every evening 30 tablet 3    carvedilol (COREG) 6 25 mg tablet Take 1 tablet (6 25 mg total) by mouth every 12 (twelve) hours 60 tablet 4    FOLBIC 2 5-25-2 MG Take 1 tablet by mouth daily  4    isosorbide mononitrate (IMDUR) 30 mg 24 hr tablet Take 1 tablet (30 mg total) by mouth daily 30 tablet 11    niacin (NIASPAN) 1000 MG CR tablet daily   3    nitroglycerin (NITROSTAT) 0 4 mg SL tablet Place 1 tablet (0 4 mg total) under the tongue every 5 (five) minutes as needed for chest pain 25 tablet 11    omega-3-acid ethyl esters (LOVAZA) 1 g capsule Take 2 g by mouth 2 (two) times a day      clopidogrel (PLAVIX) 75 mg tablet Take 1 tablet (75 mg total) by mouth daily 30 tablet 11     No current facility-administered medications on file prior to visit         History:  Past Medical History:   Diagnosis Date    Carotid artery disease ([facility] [address] )     [facility]     High cholesterol      Past Surgical History:   Procedure Laterality Date    BYPASS GRAFT  1996    CAROTID STENT  2012    COLONOSCOPY      CORONARY ARTERY BYPASS GRAFT 1995    IR AORTAGRAM WITH RUN-OFF  2020    TX SLCTV CATHJ 3RD+ ORD SLCTV ABDL PEL/LXTR [facility] Right 2020    Procedure: ARTERIOGRAM, RIGHT LEG ANGIOGRAM, BALLOON ANGIOPLASTY AND STENTING OF RIGHT SFA;  Surgeon: Bobbi Holguin MD;  Location: [facility];  Service: Vascular    VASCULAR SURGERY       Family History   Problem Relation Age of Onset    Hypertension Family     Heart disease Family     Stroke Family     No Known Problems Mother     No Known Problems Father      Social History     Socioeconomic History    Marital status:       Spouse name: Not on file    Number of children: 3    Years of education: Not on file    Highest education level: Not on file   Occupational History    Not on file   Tobacco Use    Smoking status: Former Smoker     Types: Cigarettes     Quit date: 1964     Years since quittin 1    Smokeless tobacco: Never Used   Vaping Use    Vaping Use: Never used   Substance and Sexual Activity    Alcohol use: Yes     Comment: social     Drug use: Never    Sexual activity: Not Currently   Other Topics Concern    Not on file   Social History Narrative    Not on file     Social Determinants of Health     Financial Resource Strain: Not on file   Food Insecurity: Not on file   Transportation Needs: Not on file   Physical Activity: Not on file   Stress: Not on file   Social Connections: Not on file   Intimate Partner Violence: Not on file   Housing Stability: Not on file     Past Surgical History:   Procedure Laterality Date   [address] [place] CAROTID STENT  2012    COLONOSCOPY     [address] WITH RUN-OFF  2020    TX [place] Dates 3RD+ ORD [address] PEL/LXTR [facility] Right 2020    Procedure: ARTERIOGRAM, RIGHT LEG ANGIOGRAM, BALLOON ANGIOPLASTY AND STENTING OF RIGHT SFA;  Surgeon: Bobbi Holguin MD;  Location: [facility];  Service: Vascular    VASCULAR SURGERY         OBJECTIVE:  Vitals:    22 1349   BP: 116/64   BP Location: Left arm   Patient Position: Sitting   Cuff Size: Adult   Pulse: 79   Resp: 16   Temp: 98 9 °F (37 2 °C)   TempSrc: Temporal   SpO2: 96%   Weight: 65 6 kg (144 lb 9 6 oz)   Height: 5' 2 5" (1 588 m)     Body mass index is 26 03 kg/m²  Physical Exam  Vitals reviewed  Constitutional:       General: He is not in acute distress  Appearance: Normal appearance  He is well-developed  He is not ill-appearing or diaphoretic  HENT:      Head: Normocephalic and atraumatic  Right Ear: Tympanic membrane, ear canal and external ear normal       Left Ear: Tympanic membrane, ear canal and external ear normal       Nose: Nose normal       Mouth/Throat:      Mouth: Mucous membranes are moist       Pharynx: Oropharynx is clear  No oropharyngeal exudate  Eyes:      General: No scleral icterus  Right eye: No discharge  Left eye: No discharge  Conjunctiva/sclera: Conjunctivae normal    Neck:      Vascular: No JVD  Cardiovascular:      Rate and Rhythm: Normal rate and regular rhythm  Heart sounds: Murmur heard  No friction rub  No gallop  Pulmonary:      Effort: Pulmonary effort is normal  No respiratory distress  Breath sounds: Normal breath sounds  No wheezing or rales  Abdominal:      General: Bowel sounds are normal  There is no distension  Palpations: Abdomen is soft  Tenderness: There is no abdominal tenderness  There is no guarding  Musculoskeletal:         General: No tenderness or deformity  Normal range of motion  Cervical back: Normal range of motion and neck supple  Right lower leg: No edema  Left lower leg: No edema  Skin:     General: Skin is warm  Coloration: Skin is not pale  Findings: No erythema or rash  Neurological:      General: No focal deficit present  Mental Status: He is alert and oriented to person, place, and time  Mental status is at baseline  Cranial Nerves: No cranial nerve deficit  Deep Tendon Reflexes: Reflexes are normal and symmetric  Psychiatric:         Mood and Affect: Mood normal          Behavior: Behavior normal          MoCA: 15/30      Labs & Imaging:  Lab Results   Component Value Date    WBC 5 75 06/21/2021    HGB 13 7 06/21/2021    HCT 42 7 06/21/2021    MCV 96 06/21/2021     06/21/2021     Lab Results   Component Value Date    SODIUM 140 06/21/2021    K 5 3 06/21/2021     06/21/2021    CO2 28 06/21/2021    BUN 15 06/21/2021    CREATININE 0 85 06/21/2021    GLUC 100 06/18/2021    CALCIUM 9 3 06/21/2021     Lab Results   Component Value Date    BYJXABNR97 1,014 (H) 02/16/2021     Lab Results   Component Value Date    ABJ4ADITGTXJ 1 570 02/16/2021       MRI BRAIN (2/2021)  Cerebral volume loss is slightly more prominent within the left temporal lobe than the right with mild asymmetry in the hippocampus and adjacent inferior lateral ventricle, as   well as the surrounding temporal lobe  2   NeuroQuant analysis was performed: Low hippocampal volume and enlargement of the adjacent inferior lateral venricles suggestive of local ex-vacuo dilatation  In addition there is an abnormal hippocampal occupancy score  Findings support a medial   temporal lobe focused neurodegenerative etiology

## 2022-02-13 PROBLEM — H91.93 DECREASED HEARING OF BOTH EARS: Status: ACTIVE | Noted: 2022-02-13

## 2022-02-13 NOTE — ASSESSMENT & PLAN NOTE
pHQ negative  Patient successfully weaned off paroxetine given its anticholinergic side effects in elderly patients  Patient denies any HI/SI  Continued social support by family and friends

## 2022-02-13 NOTE — ASSESSMENT & PLAN NOTE
Patient with decreased hearing  Non compliant with use of his hearing aids  Educated patient on importance of being compliant with his hearing aids given his cognitive deficits  Recommend facing patient directly and standing in close proximity when communicating to avoid confusion  Maintain [place] precautions

## 2022-02-13 NOTE — ASSESSMENT & PLAN NOTE
Stable  Patient denies any cardiorespiratory distress  Continue aspirin, atorvastatin, carvedilol, Imdur, nitroglycerin as needed  Follow-up with Cardiology as scheduled  Recommend adherence to a heart healthy diet

## 2022-02-13 NOTE — ASSESSMENT & PLAN NOTE
Stable per patient  Encourage increased fiber intake  Patient previously on Dulcolax, Colace  Follow-up with PCP for continued monitoring

## 2022-04-14 ENCOUNTER — EVALUATION (OUTPATIENT)
Dept: SPEECH THERAPY | Facility: CLINIC | Age: 82
End: 2022-04-14
Payer: MEDICARE

## 2022-04-14 DIAGNOSIS — R48.8 OTHER SYMBOLIC DYSFUNCTIONS: Primary | ICD-10-CM

## 2022-04-14 DIAGNOSIS — F03.90 DEMENTIA WITHOUT BEHAVIORAL DISTURBANCE, UNSPECIFIED DEMENTIA TYPE (HCC): ICD-10-CM

## 2022-04-14 PROCEDURE — 96125 COGNITIVE TEST BY HC PRO: CPT

## 2022-04-14 PROCEDURE — 92507 TX SP LANG VOICE COMM INDIV: CPT

## 2022-04-14 NOTE — PROGRESS NOTES
Speech-Language Pathology Initial Evaluation    Today's date: 2022  Patients name: Luke Paez  : 1940  MRN: [id number]  Safety measures: Dementia  Referring provider: Leilani Erazo MD    Primary diagnosis/billing code: R 48 8  Secondary diagnosis/billing code: F 03 90    Visit tracking:  -Referring provider: Epic  -Billing guidelines: CMS  -Visit #1/10  -Insurance: Medicare and [email or web address] due 2022    Subjective comments: "Hello!"    How did the patient hear about us? Prior patient and Physician    Patient's goal(s): "To get better again "    Reason for referral: Change in cognitive status  Prior functional status: Communication appropriate and efficient in most situations  Minimal difficulty with self-monitoring, self-correction needed  Clinically complex situations: Previous therapy to address similar deficits    History: Patient is a 81 y o  male who was referred to outpatient skilled Speech Therapy services for a cognitive-linguistic evaluation  Patient is returning to skilled outpatient Speech Therapy after finishing a clinical trial that took place in [place], [place]  Patient attended  from 2021 to 2021 and then took a break  Patient was primarily working on memory, attention, thought organization, and language skills to improve his overall QOL  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   Today, Patient reports that is currently waiting for his LHA to arrive  Hearing: Decreased (Hard of hearing, currently has R[facility], waiting on LHA to arrive)   Vision: Decreased (Wears glasses)    Home environment/lifestyle:  At home with mother in law and her aid  Highest level of education: [employer] status: Retired (But still owns automotive shop)    Mental status: Alert  Behavior status: Cooperative  Communication modalities: Verbal  Rehabilitation prognosis: Good rehab potential to reach the established goals    Assessments    The Cognitive Linguistic Quick Test (CLQT) is designed to measure an individuals five cognitive domains (attention, memory, executive functions, language, and visuospatial skills)  This norm-referenced tool has been standardized on adults ages 25 through 80years old with neurological impairment as a result of CVA, TBI, or dementia  The following results were obtained during the administration of the assessment  Cognitive Domain: Score: Range of Severity:   Attention 147/215 Mild   Memory 95/185 Moderate   Executive Functions 12/40 Moderate   Language  22/37 Moderate   Visuospatial Skills  58/105 Mild        *Composite Severity Ratin 4/4 0  Moderate             Clock Drawin/13 WNL     Patient scored below Criter[place] Cut Scores on the following subtests: Story Retelling, Symbol Trails, Generative Naming, Design Memory and Design Generation  *Patient named 8 concrete category members (animals) in 60 sec (norm=15+)  -- BELOW AVERAGE    *Patient named 7 abstract category members (words beginning with letter 'm') in 60 sec (norm=10+)  -- BELOW AVERAGE      Goals    Short Term Goals  1  Patient will be educated on the use of internal and external memory aids and compensatory strategies with 80% accuracy to facilitate increased recall of routine, personal information, and recent events, to be achieved in 4-6 weeks  2  Patient will complete auditory immediate and short term memory tasks to 80% accuracy to facilitate increased ability to retell narratives and recall information within functional living environment, to be achieved in 4-6 weeks  3  Patient will complete complex auditory attention processing tasks (e g , sentence unscramble, ranking numbers/words, etc ) to improve working memory with 80% accuracy, to be achieved in 4-6 weeks      4  Patient will facilitate planning by completing thought organization tasks (e g , sequencing, deduction puzzles, etc ) with 80% accuracy to facilitate increased executive functioning, working memory, problem solving, and processing skills, to be achieved in 4-6 weeks  To target thought organization: Patient unscrambled words in 9/10 (90%) opportunities independently, increasing to 10/10 (100%) opportunities given min verbal cues  Increased processing time was noted for this task  5  Patient will complete concrete and abstract categorization tasks to 80% accuracy to facilitate improved generative naming skills and working memory, to be achieved in 4-6 weeks  6  Patient will utilize word finding strategies during semantic feature analysis treatment activity for improved naming and verbal expression skills  Long Term Goals  1  Patient will demonstrate cognitive-communication skills consistent with age and education given use of compensatory strategies when needed to resume baseline activities and responsibilities in home, community, and work/school settings by discharge  2  Patient will complete cognitive-linguistic therapy that addresses patients specific deficits in processing speed, short-term working memory, attention to detail, monitoring, sequencing, and organization skills, with instruction, to alleviate effects of executive functioning disorder deficits by discharge  3  Patient will complete higher-level expressive language tasks (e g , word definitions, idioms, synonym/antonyms, etc) with 80% accuracy to improve functional communication skills by discharge  Impressions/Recommendations    Impressions:   -Patient presents with moderate cognitive-linguistic deficits characterized by decreased auditory immediate and delayed memory, attention, executive functions, and word finding  Patient has taken time off since he was previously seen for skilled outpatient Speech Therapy to participate in clinical trial in [address]    Patient was noted to maintain his skill level in all cognitive domains except for "memory "  Patient was noted to have difficulty with instructions that were given to him auditorily  Patient was encouraged to also look at the directions at the top of each page  Although directions were written, Patient still had confusion on tasks (especially noted with mazes and design generation)  Patient is eager to start therapy again to facilitate memory for functional tasks  Prognosis is good based on progress during last certification period and Patient motivation       Recommendations:  -Patient would benefit from outpatient skilled Speech Therapy services: Cognitive-linguistic therapy    -Frequency: 1x weekly  -Duration: 6-8 weeks    -Intervention certification from: 3/30/7665  -Intervention certification to: 79/61/4662    -Intervention comments:   40 minutes test administration; 20 minutes therapy activities   60 minutes scoring and documentation of POC

## 2022-04-19 ENCOUNTER — OFFICE VISIT (OUTPATIENT)
Dept: SPEECH THERAPY | Facility: CLINIC | Age: 82
End: 2022-04-19
Payer: MEDICARE

## 2022-04-19 DIAGNOSIS — R48.8 OTHER SYMBOLIC DYSFUNCTIONS: Primary | ICD-10-CM

## 2022-04-19 DIAGNOSIS — F03.90 DEMENTIA WITHOUT BEHAVIORAL DISTURBANCE, UNSPECIFIED DEMENTIA TYPE (HCC): ICD-10-CM

## 2022-04-19 PROCEDURE — 92507 TX SP LANG VOICE COMM INDIV: CPT

## 2022-04-19 NOTE — PROGRESS NOTES
Daily Speech Treatment Note    Today's date: 2022  Patients name: Luis A Lu  : 1940  MRN: [id number]  Safety measures: Dementia  Referring provider: Rhae Closs, MD    Primary diagnosis/billing code: R 48 8  Secondary diagnosis/billing code: F 03 90    Visit tracking:  -Referring provider: Epic  -Billing guidelines: CMS  -Visit #2/10   -Insurance: Medicare and [address] due 2022    Subjective/Behavioral:  -Patient was eager to begin session  Objective/Assessment:  -Reviewed testing results and goals in plan care with patient  Patient is in agreement at this time  Short Term Goals  1  Patient will be educated on the use of internal and external memory aids and compensatory strategies with 80% accuracy to facilitate increased recall of routine, personal information, and recent events, to be achieved in 4-6 weeks  2  Patient will complete auditory immediate and short term memory tasks to 80% accuracy to facilitate increased ability to retell narratives and recall information within functional living environment, to be achieved in 4-6 weeks  To target auditory immediate memory:  Patient was verbally presented with word pair associations and was trained using phrase, "When I say ___, you say ___ "  Patient was instructed to recall the second word given in the pair  Patient completed this task in 5/5 (100%) opportunities independently  To target auditory delayed memory: Patient was instructed to recall words from original task approx  45 minutes later after a distraction (goal #3, 5)  Patient recalled words in 4/5 (80%) opportunities independently, increasing to 5/5 (100%) opportunities given min verbal cues  3  Patient will complete complex auditory attention processing tasks (e g , sentence unscramble, ranking numbers/words, etc ) to improve working memory with 80% accuracy, to be achieved in 4-6 weeks      To target auditory attention: Patient was verbally presented with a list of 4 words and was instructed to re-order the words into a grammatically correct sentence (e g  the, open, door, back = open the back door)  Patient completed this task in 15/20 (75%) opportunities independently, increasing to 20/20 (100%) opportunities given one repetition  4  Patient will facilitate planning by completing thought organization tasks (e g , sequencing, deduction puzzles, etc ) with 80% accuracy to facilitate increased executive functioning, working memory, problem solving, and processing skills, to be achieved in 4-6 weeks  5  Patient will complete concrete and abstract categorization tasks to 80% accuracy to facilitate improved generative naming skills and working memory, to be achieved in 4-6 weeks  To target word finding and attention; patient completed the card game "Anomia" where they are asked to name people/places/things based on category presented on card (i e , artificial sweetener)  Target of game was for speed of naming and processing  Pt completed level 2 with average of 4 3 cards min (WNL; goal is 10+)  Pt skipped cards x 6       6  Patient will utilize word finding strategies during semantic feature analysis treatment activity for improved naming and verbal expression skills  Plan:  -Patient was provided with home exercises/activities to target goals in plan of care at the end of today's session   -Continue with current plan of care

## 2022-04-26 ENCOUNTER — APPOINTMENT (OUTPATIENT)
Dept: SPEECH THERAPY | Facility: CLINIC | Age: 82
End: 2022-04-26
Payer: MEDICARE

## 2022-04-26 NOTE — PROGRESS NOTES
Daily Speech Treatment Note    Today's date: 2022  Patients name: Mina Brock  : 1940  MRN: [id number]  Safety measures: Dementia  Referring provider: Carla Rodriguez MD    Primary diagnosis/billing code: R 48 8  Secondary diagnosis/billing code: F 03 90    Visit tracking:  -Referring provider: Epic  -Billing guidelines: CMS  -Visit #2/10 ***  -Insurance: Medicare and [address] due 2022    Subjective/Behavioral:  -***    Objective/Assessment:  -Reviewed testing results and goals in plan care with patient  Patient is in agreement at this time  Short Term Goals  1  Patient will be educated on the use of internal and external memory aids and compensatory strategies with 80% accuracy to facilitate increased recall of routine, personal information, and recent events, to be achieved in 4-6 weeks  2  Patient will complete auditory immediate and short term memory tasks to 80% accuracy to facilitate increased ability to retell narratives and recall information within functional living environment, to be achieved in 4-6 weeks  3  Patient will complete complex auditory attention processing tasks (e g , sentence unscramble, ranking numbers/words, etc ) to improve working memory with 80% accuracy, to be achieved in 4-6 weeks  4  Patient will facilitate planning by completing thought organization tasks (e g , sequencing, deduction puzzles, etc ) with 80% accuracy to facilitate increased executive functioning, working memory, problem solving, and processing skills, to be achieved in 4-6 weeks  5  Patient will complete concrete and abstract categorization tasks to 80% accuracy to facilitate improved generative naming skills and working memory, to be achieved in 4-6 weeks  6  Patient will utilize word finding strategies during semantic feature analysis treatment activity for improved naming and verbal expression skills        Plan:  -Patient was provided with home exercises/activities to target goals in plan of care at the end of today's session   -Continue with current plan of care

## 2022-05-03 ENCOUNTER — OFFICE VISIT (OUTPATIENT)
Dept: SPEECH THERAPY | Facility: CLINIC | Age: 82
End: 2022-05-03
Payer: MEDICARE

## 2022-05-03 DIAGNOSIS — R48.8 OTHER SYMBOLIC DYSFUNCTIONS: Primary | ICD-10-CM

## 2022-05-03 DIAGNOSIS — F03.90 DEMENTIA WITHOUT BEHAVIORAL DISTURBANCE, UNSPECIFIED DEMENTIA TYPE (HCC): ICD-10-CM

## 2022-05-03 PROCEDURE — 92507 TX SP LANG VOICE COMM INDIV: CPT

## 2022-05-03 NOTE — PROGRESS NOTES
Daily Speech Treatment Note    Today's date: 5/3/2022  Patients name: Abdulaziz Roldan  : 1940  MRN: [id number]  Safety measures: Dementia  Referring provider: Sola Nieto MD    Primary diagnosis/billing code: R 48 8  Secondary diagnosis/billing code: F 03 90    Visit tracking:  -Referring provider: Epic  -Billing guidelines: CMS  -Visit #3/10   -Insurance: Medicare and [address] due 2022    (This session was conducted by student clinician Harry Russo and was directly supervised [facility])     Subjective/Behavioral:  The patient was attentive during all therapy activities  Objective/Assessment:  No homework was reviewed with the patient at the beginning of the session  The patient unscrambled target anagrams with 73 percent accuracy  The patient ranked target words with 83 percent accuracy  Short Term Goals  1  Patient will be educated on the use of internal and external memory aids and compensatory strategies with 80% accuracy to facilitate increased recall of routine, personal information, and recent events, to be achieved in 4-6 weeks  2  Patient will complete auditory immediate and short term memory tasks to 80% accuracy to facilitate increased ability to retell narratives and recall information within functional living environment, to be achieved in 4-6 weeks  3  Patient will complete complex auditory attention processing tasks (e g , sentence unscramble, ranking numbers/words, etc ) to improve working memory with 80% accuracy, to be achieved in 4-6 weeks  A ranking worksheet was utilized to target working memory this session  After two auditory trials, this activity was modified to include the target words on Post It Notes  The patient independently ranked target words with 83 percent accuracy (5/6 trials) increasing to 100 percent accuracy (6/6 trials) given repetition (1/6 trials)     4   Patient will facilitate planning by completing thought organization tasks (e g , sequencing, deduction puzzles, etc ) with 80% accuracy to facilitate increased executive functioning, working memory, problem solving, and processing skills, to be achieved in 4-6 weeks  An anagrams worksheet was utilized to target thought organization this session  The patient was visually presented with letters that spelled a target anagram  The patient was instructed to scramble the letters to form another word (e g lime: mile) The patient independently unscrambled anagrams with 73 percent accuracy (11/15 trials) increasing to 100 percent accuracy (15/15 trials) given verbal cues (2/15 trials) and semantic cues (2/15 trials)     5  Patient will complete concrete and abstract categorization tasks to 80% accuracy to facilitate improved generative naming skills and working memory, to be achieved in 4-6 weeks  6  Patient will utilize word finding strategies during semantic feature analysis treatment activity for improved naming and verbal expression skills  Plan:  No homework was formally assigned to the patient at the end of the session  Continue with current plan of care

## 2022-05-10 ENCOUNTER — OFFICE VISIT (OUTPATIENT)
Dept: SPEECH THERAPY | Facility: CLINIC | Age: 82
End: 2022-05-10
Payer: MEDICARE

## 2022-05-10 DIAGNOSIS — F03.90 DEMENTIA WITHOUT BEHAVIORAL DISTURBANCE, UNSPECIFIED DEMENTIA TYPE (HCC): ICD-10-CM

## 2022-05-10 DIAGNOSIS — R48.8 OTHER SYMBOLIC DYSFUNCTIONS: Primary | ICD-10-CM

## 2022-05-10 PROCEDURE — 92507 TX SP LANG VOICE COMM INDIV: CPT

## 2022-05-10 NOTE — PROGRESS NOTES
Daily Speech Treatment Note    Today's date: 5/10/2022  Patients name: Austin Daugherty  : 1940  MRN: [id number]  Safety measures: Dementia  Referring provider: Dandre Mitchell MD    Primary diagnosis/billing code: R 48 8  Secondary diagnosis/billing code: F 03 90    Visit tracking:  -Referring provider: Epic  -Billing guidelines: CMS  -Visit #4/10   -Insurance: Medicare and [address] due 2022    Subjective/Behavioral:  -Patient was in good spirits during today's session  Objective/Assessment:  -Reviewed testing results and goals in plan care with patient  Patient is in agreement at this time  Short Term Goals  1  Patient will be educated on the use of internal and external memory aids and compensatory strategies with 80% accuracy to facilitate increased recall of routine, personal information, and recent events, to be achieved in 4-6 weeks  Clinician and Patient started today's session by talking about the calendar  Patient answered questions about year, month, day, holidays, etc  In 8/10 (80%) opportunities independently, increasing to 10/10 (100%) opportunities given min verbal cues  2  Patient will complete auditory immediate and short term memory tasks to 80% accuracy to facilitate increased ability to retell narratives and recall information within functional living environment, to be achieved in 4-6 weeks  3  Patient will complete complex auditory attention processing tasks (e g , sentence unscramble, ranking numbers/words, etc ) to improve working memory with 80% accuracy, to be achieved in 4-6 weeks  4  Patient will facilitate planning by completing thought organization tasks (e g , sequencing, deduction puzzles, etc ) with 80% accuracy to facilitate increased executive functioning, working memory, problem solving, and processing skills, to be achieved in 4-6 weeks      To target thought organization: Patient completed math word problems associated with money (e g  Gas costs $2 19 a gallon  Your car needs 15 gallons  How much will you spend?)  Patient completed this task in 7/10 (70%) opportunities independently, increasing to 10/10 (100%) opportunities given mod verbal cues (especially noted for multiplication related problems)  To target divided attention, the card game "BLINK" was introduced (shapes/colors/numbers)  Patient was first asked to separate cards into two piles; only allowing to have 3 cards in their hand at a time  Patient to discard/match into piles when meeting one of the three criteria (color, shape or number)  Patient required min-mod cues to begin task, and realize when a match could be made  Patient completed in timely manner to 95% acc  5  Patient will complete concrete and abstract categorization tasks to 80% accuracy to facilitate improved generative naming skills and working memory, to be achieved in 4-6 weeks  6  Patient will utilize word finding strategies during semantic feature analysis treatment activity for improved naming and verbal expression skills  Plan:  -Patient was provided with home exercises/activities to target goals in plan of care at the end of today's session   -Continue with current plan of care

## 2022-05-17 ENCOUNTER — APPOINTMENT (OUTPATIENT)
Dept: SPEECH THERAPY | Facility: CLINIC | Age: 82
End: 2022-05-17
Payer: MEDICARE

## 2022-05-17 NOTE — PROGRESS NOTES
Speech-Language Pathology Re-Evaluation    Today's date: 2022  Patients name: Dina Hoffman  : 1940  MRN: [id number]  Safety measures: Dementia  Referring provider: Himanshu Miller MD    Primary diagnosis/billing code: R 48 8  Secondary diagnosis/billing code: F 03 90    Visit tracking:  -Referring provider: Epic  -Billing guidelines: CMS  -Visit #4/10 ***  -Insurance: Medicare and [address] due 2022    Subjective comments: ***    Patient's goal(s): "To get better again "    Assessments    ***    Goals    Short Term Goals  1  Patient will be educated on the use of internal and external memory aids and compensatory strategies with 80% accuracy to facilitate increased recall of routine, personal information, and recent events, to be achieved in 4-6 weeks  2  Patient will complete auditory immediate and short term memory tasks to 80% accuracy to facilitate increased ability to retell narratives and recall information within functional living environment, to be achieved in 4-6 weeks  3  Patient will complete complex auditory attention processing tasks (e g , sentence unscramble, ranking numbers/words, etc ) to improve working memory with 80% accuracy, to be achieved in 4-6 weeks  4  Patient will facilitate planning by completing thought organization tasks (e g , sequencing, deduction puzzles, etc ) with 80% accuracy to facilitate increased executive functioning, working memory, problem solving, and processing skills, to be achieved in 4-6 weeks  5  Patient will complete concrete and abstract categorization tasks to 80% accuracy to facilitate improved generative naming skills and working memory, to be achieved in 4-6 weeks  6  Patient will utilize word finding strategies during semantic feature analysis treatment activity for improved naming and verbal expression skills  Long Term Goals  1   Patient will demonstrate cognitive-communication skills consistent with age and education given use of compensatory strategies when needed to resume baseline activities and responsibilities in home, community, and work/school settings by discharge  2  Patient will complete cognitive-linguistic therapy that addresses patients specific deficits in processing speed, short-term working memory, attention to detail, monitoring, sequencing, and organization skills, with instruction, to alleviate effects of executive functioning disorder deficits by discharge  3  Patient will complete higher-level expressive language tasks (e g , word definitions, idioms, synonym/antonyms, etc) with 80% accuracy to improve functional communication skills by discharge  Impressions/Recommendations    Impressions:   -Patient presents with moderate cognitive-linguistic deficits characterized by decreased auditory immediate and delayed memory, attention, executive functions, and word finding  Patient has taken time off since he was previously seen for skilled outpatient Speech Therapy to participate in clinical trial in [place], [place]  Patient was noted to maintain his skill level in all cognitive domains except for "memory "  Patient was noted to have difficulty with instructions that were given to him auditorily  Patient was encouraged to also look at the directions at the top of each page  Although directions were written, Patient still had confusion on tasks (especially noted with mazes and design generation)  Patient is eager to start therapy again to facilitate memory for functional tasks  Prognosis is good based on progress during last certification period and Patient motivation       Recommendations:  -Patient would benefit from outpatient skilled Speech Therapy services: {BENEFIT FROM:04607}    -Frequency: {FREQUENCY:28257}  -Duration: {DURATION:43218}    -Intervention certification from: 6/17/0641  -Intervention certification to: ***    -Intervention comments:   ***

## 2022-05-24 ENCOUNTER — EVALUATION (OUTPATIENT)
Dept: SPEECH THERAPY | Facility: CLINIC | Age: 82
End: 2022-05-24
Payer: MEDICARE

## 2022-05-24 DIAGNOSIS — R48.8 OTHER SYMBOLIC DYSFUNCTIONS: Primary | ICD-10-CM

## 2022-05-24 DIAGNOSIS — F03.90 DEMENTIA WITHOUT BEHAVIORAL DISTURBANCE, UNSPECIFIED DEMENTIA TYPE (HCC): ICD-10-CM

## 2022-05-24 PROCEDURE — 92507 TX SP LANG VOICE COMM INDIV: CPT

## 2022-05-24 NOTE — PROGRESS NOTES
Speech-Language Pathology Re-Evaluation    Today's date: 2022  Patients name: Dina Hoffman  : 1940  MRN: [id number]  Safety measures: Dementia  Referring provider: Himanshu Miller MD    Primary diagnosis/billing code: R 48 8  Secondary diagnosis/billing code: F 03 90    Visit tracking:  -Referring provider: Epic  -Billing guidelines: CMS  -Visit #1/10 (5 Total)  -Insurance: Medicare and [address] due 2022    Subjective comments: "I was having back pain "    Patient's goal(s): "To get better again "    Assessments    **No formal/updated testing was completed today as Patient was only seen 3X since initial evaluation  The following serves as a diagnostic treatment session and progress update:    -To target thought organization: Patient created patterns using colorful blocks and a template while simultaneously naming items in a given category  Patient completed this task over 2 trials  Trial One: 2 Errors on pattern; 5 Repeated Words ([employer])   Trial Two: 0 Errors on pattern; 2 Repeated Words (Articles of Clothing)     It should be noted that Patient needed increased processing time with category naming part of this task, even with familiar topics (e g  car parts)  -To target thought organization: Patient completed 3X5 deduction puzzle ([facility] Puzzle: 1; Page 90) utilizing the clues provided  Patient completed this task in 7/15 (47%) opportunities independently, increasing to 15/15 (100%) opportunities given mod to max verbal cues  Patient demonstrated difficulty with this puzzle as it was related to the [employer]  Because Patient served in [employer], he was taking the clues literally and was confused as to why each position served for X amount of years  He was comparing clues to his real life experience which made it difficult for him to solve  Goals    Short Term Goals  1   Patient will be educated on the use of internal and external memory aids and compensatory strategies with 80% accuracy to facilitate increased recall of routine, personal information, and recent events, to be achieved in 4-6 weeks  --MET    2  Patient will complete auditory immediate and short term memory tasks to 80% accuracy to facilitate increased ability to retell narratives and recall information within functional living environment, to be achieved in 4-6 weeks  --PARTIALLY MET    3  Patient will complete complex auditory attention processing tasks (e g , sentence unscramble, ranking numbers/words, etc ) to improve working memory with 80% accuracy, to be achieved in 4-6 weeks  --PARTIALLY MET    4  Patient will facilitate planning by completing thought organization tasks (e g , sequencing, deduction puzzles, etc ) with 80% accuracy to facilitate increased executive functioning, working memory, problem solving, and processing skills, to be achieved in 4-6 weeks  --PARTIALLY MET    5  Patient will complete concrete and abstract categorization tasks to 80% accuracy to facilitate improved generative naming skills and working memory, to be achieved in 4-6 weeks  --PARTIALLY MET    6  Patient will utilize word finding strategies during semantic feature analysis treatment activity for improved naming and verbal expression skills  --PARTIALLY MET    Long Term Goals  1  Patient will demonstrate cognitive-communication skills consistent with age and education given use of compensatory strategies when needed to resume baseline activities and responsibilities in home, community, and work/school settings by discharge  --PARTIALLY MET    2  Patient will complete cognitive-linguistic therapy that addresses patients specific deficits in processing speed, short-term working memory, attention to detail, monitoring, sequencing, and organization skills, with instruction, to alleviate effects of executive functioning disorder deficits by discharge  --PARTIALLY MET    3   Patient will complete higher-level expressive language tasks (e g , word definitions, idioms, synonym/antonyms, etc) with 80% accuracy to improve functional communication skills by discharge  --PARTIALLY MET        Impressions/Recommendations    Impressions:   -Patient presents with moderate cognitive-linguistic deficits characterized by decreased auditory immediate and delayed memory, attention, executive functions, and word finding  Patient has taken time off since he was previously seen for skilled outpatient Speech Therapy to participate in clinical trial in [place], [place]  Patient was noted to maintain his skill level in all cognitive domains except for "memory "  Patient continues to demonstrate some difficulty with auditory directions, but this has seemed to improve with the use of his hearing aid  When Patient has his HA in, he does not ask for as many repetitions compared to hearing without  Patient is eager to continue therapy to facilitate memory for functional tasks  Prognosis is good based on progress during last certification period and Patient motivation       Recommendations:  -Patient would benefit from outpatient skilled Speech Therapy services: Cognitive-linguistic therapy    -Frequency: 1x weekly  -Duration: 6-8 weeks    -Intervention certification from: 3/31/5923  -Intervention certification to: 87/78/9323

## 2022-05-31 ENCOUNTER — OFFICE VISIT (OUTPATIENT)
Dept: SPEECH THERAPY | Facility: CLINIC | Age: 82
End: 2022-05-31
Payer: MEDICARE

## 2022-05-31 DIAGNOSIS — R48.8 OTHER SYMBOLIC DYSFUNCTIONS: Primary | ICD-10-CM

## 2022-05-31 DIAGNOSIS — F03.90 DEMENTIA WITHOUT BEHAVIORAL DISTURBANCE, UNSPECIFIED DEMENTIA TYPE (HCC): ICD-10-CM

## 2022-05-31 PROCEDURE — 92507 TX SP LANG VOICE COMM INDIV: CPT

## 2022-05-31 NOTE — PROGRESS NOTES
Daily Speech Treatment Note    Today's date: 2022  Patients name: Marisela Pacheco  : 1940  MRN: [id number]  Safety measures: Dementia  Referring provider: Lucia Vora MD    Primary diagnosis/billing code: R 48 8  Secondary diagnosis/billing code: F 03 90    Visit tracking:  -Referring provider: Epic  -Billing guidelines: CMS  -Visit #2/10 (6 Total)   -Insurance: Medicare and [address] due 2022    Subjective/Behavioral:  -Patient stated he opened his pool over the weekend  Objective/Assessment:  -Reviewed patient's home exercises/activities completed since last appointment  Patient completed deduction puzzles with 100% accuracy  Short Term Goals  2  Patient will complete auditory immediate and short term memory tasks to 80% accuracy to facilitate increased ability to retell narratives and recall information within functional living environment, to be achieved in 4-6 weeks  --PARTIALLY MET    3  Patient will complete complex auditory attention processing tasks (e g , sentence unscramble, ranking numbers/words, etc ) to improve working memory with 80% accuracy, to be achieved in 4-6 weeks  --PARTIALLY MET    To target auditory attention: Patient was verbally presented with a list of 4 words and was instructed to re-order the words into the most logical sequence (e g  Wednesday, Monday, Friday, Thursday = Monday, Wednesday, Thursday, Friday)  Patient completed this task in 2/10 (20%) opportunities independently, increasing to 5/10 (50%) opportunities given one repetition, and increasing to 10/10 (100%) opportunities given written support  4  Patient will facilitate planning by completing thought organization tasks (e g , sequencing, deduction puzzles, etc ) with 80% accuracy to facilitate increased executive functioning, working memory, problem solving, and processing skills, to be achieved in 4-6 weeks   --PARTIALLY MET    To target thought organization: Patient completed trail making tasks  -Numbers/Letters: 18/20 (90%)   -Numbers/Shapes: 20/20 (100%)     *It should be noted that Patient demonstrated great independence with this task  Instructions were only given one time  5  Patient will complete concrete and abstract categorization tasks to 80% accuracy to facilitate improved generative naming skills and working memory, to be achieved in 4-6 weeks  --PARTIALLY MET    To target word finding and attention; patient completed the card game "Anomia" where they are asked to name people/places/things based on category presented on card (i e , artificial sweetener)  Target of game was for speed of naming and processing  Pt completed level 2 with average of 4 2 cards min (WNL; goal is 10+)  Pt skipped cards x 3     6  Patient will utilize word finding strategies during semantic feature analysis treatment activity for improved naming and verbal expression skills  --PARTIALLY MET      Plan:  -Patient was provided with home exercises/activities to target goals in plan of care at the end of today's session   -Continue with current plan of care

## 2022-06-07 DIAGNOSIS — I70.211 ATHEROSCLEROSIS OF NATIVE ARTERY OF RIGHT LOWER EXTREMITY WITH INTERMITTENT CLAUDICATION (HCC): ICD-10-CM

## 2022-06-07 RX ORDER — ISOSORBIDE MONONITRATE 30 MG/1
TABLET, EXTENDED RELEASE ORAL
Qty: 30 TABLET | Refills: 0 | Status: SHIPPED | OUTPATIENT
Start: 2022-06-07

## 2022-06-13 ENCOUNTER — TELEPHONE (OUTPATIENT)
Dept: SPEECH THERAPY | Facility: CLINIC | Age: 82
End: 2022-06-13

## 2022-06-13 NOTE — TELEPHONE ENCOUNTER
Therapist contacted Patient via phone call on 06/13/2022 to return his phone call  Patient is concerned about a headache that he has had for the past few days  He believes that it could be due to a medication he has been taking  Patient stated that he did call his PCP to get an appointment to discuss  Therapist also reached out to Patient's daughter  Daughter is aware of the headaches Patient has been having  She also advised him to visit with PCP  She is a nurse and is following closely with his symptoms

## 2022-06-14 ENCOUNTER — TELEPHONE (OUTPATIENT)
Dept: SPEECH THERAPY | Facility: CLINIC | Age: 82
End: 2022-06-14

## 2022-06-14 NOTE — TELEPHONE ENCOUNTER
Therapist contacted Patient after he did not show for his appointment today at 9:00  Therapist explained that if he does not come for his appointment next week, all remaining appointments will have to be removed and Patient will be discharged

## 2022-06-21 ENCOUNTER — OFFICE VISIT (OUTPATIENT)
Dept: SPEECH THERAPY | Facility: CLINIC | Age: 82
End: 2022-06-21
Payer: MEDICARE

## 2022-06-21 DIAGNOSIS — R48.8 OTHER SYMBOLIC DYSFUNCTIONS: Primary | ICD-10-CM

## 2022-06-21 DIAGNOSIS — F03.90 DEMENTIA WITHOUT BEHAVIORAL DISTURBANCE, UNSPECIFIED DEMENTIA TYPE (HCC): ICD-10-CM

## 2022-06-21 PROCEDURE — 92507 TX SP LANG VOICE COMM INDIV: CPT

## 2022-06-21 NOTE — PROGRESS NOTES
Daily Speech Treatment Note    Today's date: 2022  Patients name: Hallie Duran  : 1940  MRN: [id number]  Safety measures: Dementia  Referring provider: Guadalupe Fishcer MD    Primary diagnosis/billing code: R 48 8  Secondary diagnosis/billing code: F 03 90    Visit tracking:  -Referring provider: Epic  -Billing guidelines: CMS  -Visit #3/10 (7 Total)  -Insurance: Medicare and [id number] due 2022    Subjective/Behavioral:  -Patient arrived promptly today  Objective/Assessment:  -Reviewed patient's home exercises/activities completed since last appointment  No homework was returned today  Short Term Goals  2  Patient will complete auditory immediate and short term memory tasks to 80% accuracy to facilitate increased ability to retell narratives and recall information within functional living environment, to be achieved in 4-6 weeks  --PARTIALLY MET    To target auditory immediate memory: Patient was read stories approx  4-5 sentences in length and was then asked questions regarding details from the story  Patient answered questions in 2/12 (17%) opportunities independently, increasing to 10/12 (83%) opportunities given a repetition of the story and mod verbal cues  3  Patient will complete complex auditory attention processing tasks (e g , sentence unscramble, ranking numbers/words, etc ) to improve working memory with 80% accuracy, to be achieved in 4-6 weeks  --PARTIALLY MET    4  Patient will facilitate planning by completing thought organization tasks (e g , sequencing, deduction puzzles, etc ) with 80% accuracy to facilitate increased executive functioning, working memory, problem solving, and processing skills, to be achieved in 4-6 weeks  --PARTIALLY MET    To target thought organization: Patient was provided with five words on index cards and was instructed to unscramble words to form sentences    Patient completed this task in 18/20 (90%) opportunities independently, increasing to 20/20 (100%) opportunities given min verbal cues  To target thought organization: Patient completed deduction puzzle [facility]) utilizing the clues provided  Patient completed this task in 5/7 (71%) opportunities independently, increasing to 7/7 (100%) opportunities given min verbal cues  5  Patient will complete concrete and abstract categorization tasks to 80% accuracy to facilitate improved generative naming skills and working memory, to be achieved in 4-6 weeks  --PARTIALLY MET    6  Patient will utilize word finding strategies during semantic feature analysis treatment activity for improved naming and verbal expression skills  --PARTIALLY MET      Plan:  -Patient was provided with home exercises/activities to target goals in plan of care at the end of today's session   -Continue with current plan of care

## 2022-06-28 ENCOUNTER — EVALUATION (OUTPATIENT)
Dept: SPEECH THERAPY | Facility: CLINIC | Age: 82
End: 2022-06-28
Payer: MEDICARE

## 2022-06-28 DIAGNOSIS — R48.8 OTHER SYMBOLIC DYSFUNCTIONS: Primary | ICD-10-CM

## 2022-06-28 DIAGNOSIS — F03.90 DEMENTIA WITHOUT BEHAVIORAL DISTURBANCE, UNSPECIFIED DEMENTIA TYPE (HCC): ICD-10-CM

## 2022-06-28 PROCEDURE — 96125 COGNITIVE TEST BY HC PRO: CPT

## 2022-06-28 NOTE — PROGRESS NOTES
Speech-Language Pathology Re-Evaluation    Today's date: 2022  Patients name: Frandy Claire  : 1940  MRN: [id number]  Safety measures: Dementia  Referring provider: Robert Galan MD    Primary diagnosis/billing code: R 48 8  Secondary diagnosis/billing code: F 03 90    Visit tracking:  -Referring provider: Epic  -Billing guidelines: CMS  -Visit #1/10 (8 Total)  -Insurance: Medicare and [address] due 2022    Subjective comments: Patient did not bring hearing aids today  Patient's goal(s): "To get better again "    Assessments    The [facility] Information Processing Assessment-Geriatric: Second Edition (RIPA-) is a comprehensive, norm-referenced assessment battery designed to identify, describe, and quantify cognitive-linguistic deficits in the geriatric population (individuals 55 years and older)    The subtests of the RIPA  include: (1) Immediate Memory--repeat numbers, words, and sentences of increasing length and complexity; (2) Temporal Orientation--answer questions related to the concept of time (elicitation of accurate responses requires recall from recent and remote memory/temporal concepts require functional organizational skills); (3) Spatial Orientation--answer questions related to the concept of locations or places (elicitation of accurate responses requires recall from both recent and remote memory/spatial concepts require organizational skills, including categorization and sequencing); (4) General Information--recall general information encoded in remote memory (most stimuli represented in this subtest includes information learned by the sixth grade); (5) Situational Knowledge--respond to stimuli requiring problem solving and reasoning strategies (ability to generate response options is based on relevant information/once a solution is deduced, it must be checked against knowledge of reality and against reality itself); (6) Categorical Vocabulary--list items in a category as well as name categories names of items (elicitation of accurate responses require a semantic organization base, word finding abilities, and general organization skills; and (7) Listening Comprehension--listen to a short narrative paragraph and answer specific questions about the content (paragraphs included are of increasing length and complexity/elicitation of accurate response require skills of immediate memory, auditory sequential memory, receptive vocabulary, and processing speed)  The following results were obtained during the administration of the assessment:     Subtest: Raw score: Percentile:  Scaled Score: Degree of Severity:   1  Immediate Memory 15/39 8%tile 4 Moderate   2  Temporal Orientation 36/39 59%tile 11 WNL   3  Spatial Orientation 42/45 80%tile 12 WNL   4  General Information 27/54 11%tile 5 Mild   5  Situational Knowledge 33/45 13%tile 7 Mild   6  Categorical Vocabulary 27/45 11%tile 6 Mild   7  Listening Comprehension 34/72 24%tile 8 Mild        Information Processing Index:     -Sum of Scaled Scores: 53   -Composite Index: 81   -Percentile Rank: 12%tile   -Degree of Severity:  Mild       Goals    Short Term Goals  2  Patient will complete auditory immediate and short term memory tasks to 80% accuracy to facilitate increased ability to retell narratives and recall information within functional living environment, to be achieved in 4-6 weeks  --PARTIALLY MET    3  Patient will complete complex auditory attention processing tasks (e g , sentence unscramble, ranking numbers/words, etc ) to improve working memory with 80% accuracy, to be achieved in 4-6 weeks  --PARTIALLY MET    4  Patient will facilitate planning by completing thought organization tasks (e g , sequencing, deduction puzzles, etc ) with 80% accuracy to facilitate increased executive functioning, working memory, problem solving, and processing skills, to be achieved in 4-6 weeks  --PARTIALLY MET    5   Patient will complete concrete and abstract categorization tasks to 80% accuracy to facilitate improved generative naming skills and working memory, to be achieved in 4-6 weeks  --MET    6  Patient will utilize word finding strategies during semantic feature analysis treatment activity for improved naming and verbal expression skills  --MET    NEW GOALS:  7  Patient will name an average of 15+ items in a category in 60 seconds over 5 trials using compensatory strategies and min cues to facilitate improved word retrieval skills, to be achieved in 4-6 weeks  8  Patient will name an average of 10+ words that begin with a specific letter in 60 seconds over 5 trials using compensatory strategies and min cues to facilitate improved word retrieval skills, to be achieved in 4-6 weeks  9  Patient will name an appropriate synonym/antonym for a given word with 80% accuracy to build expressive vocabulary for conversation, to be achieved in 4-6 weeks  10  Patient will complete word generation tasks (e g , analogies, category matrices, etc ) with 80% accuracy using word finding strategies to facilitate improved word retrieval skills, to be achieved in 4-6 weeks  Long Term Goals  1  Patient will demonstrate cognitive-communication skills consistent with age and education given use of compensatory strategies when needed to resume baseline activities and responsibilities in home, community, and work/school settings by discharge  --PARTIALLY MET    2  Patient will complete cognitive-linguistic therapy that addresses patients specific deficits in processing speed, short-term working memory, attention to detail, monitoring, sequencing, and organization skills, with instruction, to alleviate effects of executive functioning disorder deficits by discharge  --PARTIALLY MET    3   Patient will complete higher-level expressive language tasks (e g , word definitions, idioms, synonym/antonyms, etc) with 80% accuracy to improve functional communication skills by discharge  --PARTIALLY MET        Impressions/Recommendations    Impressions:   -Patient presents with moderate cognitive-linguistic deficits characterized by decreased auditory immediate and delayed memory, attention, executive functions, and word finding  During RIPA-G testing today, Patient demonstrated strengths in temporal and spatial orientation as well as listening comprehension  Patient was noted to have deficits in auditory immediate attention, recalling general information, and word finding (categorical knowledge, generative naming)  It should be noted that Patient has been utilizing more generic or "empty" speech when trying to explain ideas/concepts (e g  "You know the thing "  "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  You know who he is ")  Patient's daughter has also noticed this pattern when her and Clinician were last on the phone together  Because of this, additional goals have been added for more specific word finding skills  Patient continues to demonstrate some difficulty with auditory directions, but this has seemed to improve with the use of his hearing aid  When Patient has his HA in, he does not ask for as many repetitions compared to hearing without  Patient is eager to continue therapy to facilitate memory for functional tasks  Prognosis is good based on progress during last certification period and Patient motivation         Recommendations:  -Patient would benefit from outpatient skilled Speech Therapy services: Cognitive-linguistic therapy    -Frequency: 1x weekly  -Duration: 6-8 weeks    -Intervention certification from: 5/91/6074  -Intervention certification to: 39/87/7118    -Intervention comments:   60 minutes test administration  40 minutes scoring and documentation of POC

## 2022-07-12 ENCOUNTER — TELEPHONE (OUTPATIENT)
Dept: SPEECH THERAPY | Facility: CLINIC | Age: 82
End: 2022-07-12

## 2022-07-12 NOTE — TELEPHONE ENCOUNTER
The patient's daughter was contacted in order to attempt to reschedule today's cancelled appointment  As the patient is not feeling well, his daughter will call the [address] [address] office tomorrow to give an update and determine whether or not he may be able to come in another day this week

## 2022-07-22 ENCOUNTER — TELEPHONE (OUTPATIENT)
Dept: SPEECH THERAPY | Facility: CLINIC | Age: 82
End: 2022-07-22

## 2022-07-22 NOTE — TELEPHONE ENCOUNTER
Therapist contacted Patient's daughter to discuss POC and attendance policy  Patient's daughter was very receptive to conversation and will be speaking with Patient over the weekend to determine plan of action  She will call back with more information prior to next session

## 2022-08-04 ENCOUNTER — TELEPHONE (OUTPATIENT)
Dept: SPEECH THERAPY | Facility: CLINIC | Age: 82
End: 2022-08-04

## 2022-08-04 ENCOUNTER — TELEPHONE (OUTPATIENT)
Dept: GERIATRICS | Age: 82
End: 2022-08-04

## 2022-08-04 NOTE — TELEPHONE ENCOUNTER
Left message for pt's daughter, Marguerite Cunningham  Pt's follow up appointment on 02/10/23 needs to be rescheduled to NP  Instructed daughter to call office back to make the change

## 2022-08-04 NOTE — TELEPHONE ENCOUNTER
Patient's daughter, Halima Mcdonough, returned call  Declining offer for patient to be seen by NP  Patient is participating in a memory study with [employer]  Daughter is a NP, and does not feel patient needs our care at this time

## 2022-08-04 NOTE — TELEPHONE ENCOUNTER
Therapist contacted Patient's daughter via phone call on 08/04/2022 regarding POC and scheduling  A message was left with the clinic's phone number

## 2022-08-08 NOTE — PROGRESS NOTES
Patient has not returned to therapy in the past month and therefore will be discharged  Phone calls were made to Patient and his daughter  Should Patient wish to return to therapy, a new script would be warranted at that time

## 2022-11-14 ENCOUNTER — OFFICE VISIT (OUTPATIENT)
Dept: CARDIOLOGY CLINIC | Facility: MEDICAL CENTER | Age: 82
End: 2022-11-14

## 2022-11-14 VITALS
BODY MASS INDEX: 25.34 KG/M2 | HEART RATE: 80 BPM | HEIGHT: 63 IN | OXYGEN SATURATION: 91 % | WEIGHT: 143 LBS | DIASTOLIC BLOOD PRESSURE: 70 MMHG | SYSTOLIC BLOOD PRESSURE: 124 MMHG

## 2022-11-14 DIAGNOSIS — I25.118 CORONARY ARTERY DISEASE OF NATIVE ARTERY OF NATIVE HEART WITH STABLE ANGINA PECTORIS (HCC): Primary | ICD-10-CM

## 2022-11-14 DIAGNOSIS — Z95.1 HX OF CABG: ICD-10-CM

## 2022-11-14 DIAGNOSIS — I73.9 PAD (PERIPHERAL ARTERY DISEASE) (HCC): ICD-10-CM

## 2022-11-14 DIAGNOSIS — F03.B0 MODERATE DEMENTIA WITHOUT BEHAVIORAL DISTURBANCE, PSYCHOTIC DISTURBANCE, MOOD DISTURBANCE, OR ANXIETY, UNSPECIFIED DEMENTIA TYPE: ICD-10-CM

## 2022-11-14 DIAGNOSIS — I65.23 ASYMPTOMATIC BILATERAL CAROTID ARTERY STENOSIS: ICD-10-CM

## 2022-11-14 NOTE — PATIENT INSTRUCTIONS
Continue current medicines  Verify medication list with your daughter  Report any worsening chest pain or shortness of breath on exertion  Keep follow-up with primary care and vascular surgery

## 2022-11-14 NOTE — PROGRESS NOTES
[facility] [place] [facility] [address]  Phone#  [phone]  Fax#  [phone]  [facility] Cardiology Office Follow-up Visit             NAME: Salinas Villalta  AGE: 81 y o  SEX: male   : 1940   MRN: [id number]    DATE: 2022  TIME: 11:25 AM    Assessment and plan:    Cardiology Problem list:  CAD s/p CABG x3 in : Angina: SPECT:  EF 52%, moderate-sized, mildly severe, partially reversible myocardial perfusion defect of the apical apical and anterior wall  CATH: : LM severe disease, Vein graft to the LAD is occluded, but the LAD fills well from a LIMA to D1  SVG to OM1 is patent  The RCA was severely calcified and diseased: PCI of prox and mid RCA with DESx2  PVD status post SFA stent  : Carotid Doppler: Mild disease  Dementia    Current diagnoses:  1  Coronary artery disease of native artery of native heart with stable angina pectoris (HCC)  POCT ECG   2  Asymptomatic bilateral carotid artery stenosis     3  Hx of CABG     4  PAD (peripheral artery disease) ([address] )     5  Moderate dementia without behavioral disturbance, psychotic disturbance, mood disturbance, or anxiety, unspecified dementia type         Discussion/recommendations:  He reports stable anginal symptoms after his last revascularization  Last cardiac catheterization report again reviewed  EF was preserved on the last nuclear stress test   He has had some progression of lower extremity vascular disease on doppler  but no rest pain, ulceration or skin breakdown reported  Current medical therapy reviewed  Lipids are managed by primary care  No side effects from statin therapy  I feel that his dementia has worsened, but he still remains independent  He will verify his medications on the printout we are giving him with the help of his daughter to reconcile his medication list   I did verify that he is not taking the clopidogrel but he is not sure of that    He will continue his aspirin, statins, beta-blockers and long-acting nitrates  Chief Complaint   Patient presents with   • Follow-up     1 year f/up       HPI:    Baron Bauman is a 81y o -year-old male who presents to the cardiology clinic for follow up for the above-listed problems  He has history of CAD status post CABG  He had an abnormal nuclear stress test last year and subsequent complex PCI of the proximal and mid RCA with placement of 2 stents  Angina controlled on long-acting nitrates  Reports stable claudication  Previous vascular study from 01/22 showed progression of the left SFA stenosis  Last carotid Doppler showed no significant disease 2020  He follows up with vascular surgery regarding that  Patient is doing well from a cardiac standpoint  No interim hospitalizations for cardiac causes  Clopidogrel has been stopped  Remains on atorvastatin, carvedilol and low-dose aspirin  Current medications reviewed  Reports compliance to medicines but he is not too sure of what meds he is on  No side effects reported  Denies chest pain on exertion  Denies worsening shortness of breath  Denies sustained palpitations  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  His memory has gone down  He is unable to recall all the medication names he is on  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 and has become more forgetful  EKG:  Sinus rhythm, rate 70, rightward axis, baseline artifact, probable prior septal infarction, IVCD    Past history, family history, social history, current medications, vital signs, recent lab and imaging studies and  prior cardiology studies reviewed independently on this visit      Patient Active Problem List   Diagnosis   • Right leg claudication (HCC)   • Hx of CABG   • PAD (peripheral artery disease) ([facility] )   • Depression   • Asymptomatic bilateral carotid artery stenosis   • Dementia (HCC)   • Abnormal laboratory test   • Elevated blood pressure reading   • Other constipation   • Tinnitus of both ears   • Atheroscler native arteries the extremities w/intermit claudication (HCC)   • Chest pain on exertion   • Coronary artery disease involving native coronary artery   • Abnormal nuclear stress test   • Decreased hearing of both ears      No Known Allergies    Current Outpatient Medications:   •  Apoaequorin (PREVAGEN PO), Take by mouth, Disp: , Rfl:   •  aspirin 81 mg chewable tablet, Chew 1 tablet (81 mg total) daily, Disp: 30 tablet, Rfl: 11  •  atorvastatin (LIPITOR) 40 mg tablet, Take 1 tablet (40 mg total) by mouth every evening, Disp: 30 tablet, Rfl: 3  •  carvedilol (COREG) 6 25 mg tablet, Take 1 tablet (6 25 mg total) by mouth every 12 (twelve) hours, Disp: 60 tablet, Rfl: 4  •  cefuroxime (CEFTIN) 250 mg tablet, , Disp: , Rfl:   •  famotidine (PEPCID) 40 MG tablet, Take 1 tablet (40 mg total) by mouth daily, Disp: 90 tablet, Rfl: 1  •  FOLBIC 2 5-25-2 MG, Take 1 tablet by mouth daily, Disp: , Rfl: 4  •  isosorbide mononitrate (IMDUR) 30 mg 24 hr tablet, Take 1 tablet by mouth once daily, Disp: 30 tablet, Rfl: 0  •  niacin (NIASPAN) 1000 MG CR tablet, daily , Disp: , Rfl: 3  •  nitroglycerin (NITROSTAT) 0 4 mg SL tablet, Place 1 tablet (0 4 mg total) under the tongue every 5 (five) minutes as needed for chest pain, Disp: 25 tablet, Rfl: 11  •  omega-3-acid ethyl esters (LOVAZA) 1 g capsule, Take 2 g by mouth 2 (two) times a day, Disp: , Rfl:   •  pantoprazole (PROTONIX) 20 mg tablet, Take 20 mg by mouth daily, Disp: , Rfl:     Past Medical History:   Diagnosis Date   • Carotid artery disease ([address] )    • Claudication in peripheral vascular disease ([address] )    • High cholesterol      Past Surgical History:   Procedure Laterality Date   • BYPASS GRAFT  1996   • CAROTID STENT  2012   • COLONOSCOPY     • CORONARY ARTERY BYPASS GRAFT  1995   • IR AORTAGRAM WITH RUN-OFF  7/2/2020   • [place] SLCTV CATHJ 3RD+ ORD SLCTV ABDL PEL/LXTR [facility] Right 7/2/2020 Procedure: ARTERIOGRAM, RIGHT LEG ANGIOGRAM, BALLOON ANGIOPLASTY AND STENTING OF RIGHT SFA;  Surgeon: Taisha Mitchell MD;  Location: [facility];  Service: Vascular   • VASCULAR SURGERY       Family History   Problem Relation Age of Onset   • Hypertension Family    • Heart disease Family    • Stroke Family    • No Known Problems Mother    • No Known Problems Father      Social History   reports that he quit smoking about 58 years ago  His smoking use included cigarettes  He has never used smokeless tobacco  He reports current alcohol use  He reports that he does not use drugs  Review of Systems   Constitutional: Positive for fatigue  Negative for fever  HENT: Negative  Eyes: Negative  Respiratory: Negative for chest tightness, shortness of breath and wheezing  Cardiovascular: Negative for chest pain, palpitations and leg swelling  Endocrine: Negative  Genitourinary: Negative  Musculoskeletal: Positive for myalgias  Skin: Negative for rash  Neurological: Negative for syncope  Hematological: Does not bruise/bleed easily  Psychiatric/Behavioral: Positive for decreased concentration  Objective:     Vitals:    11/14/22 1100   BP: 124/70   Pulse: 80   SpO2: 91%     Wt Readings from Last 3 Encounters:   11/14/22 64 9 kg (143 lb)   06/24/22 65 3 kg (144 lb)   03/18/22 65 3 kg (144 lb)     Pulse Readings from Last 3 Encounters:   11/14/22 80   02/11/22 79   06/17/21 62     BP Readings from Last 3 Encounters:   11/14/22 124/70   02/11/22 116/64   06/17/21 92/54       Physical Exam  Constitutional:       General: He is not in acute distress  HENT:      Head: Normocephalic  Mouth/Throat:      Mouth: Mucous membranes are moist    Cardiovascular:      Rate and Rhythm: Normal rate and regular rhythm  Heart sounds: S1 normal and S2 normal  Murmur heard  Systolic murmur is present with a grade of 2/6  Pulmonary:      Breath sounds: No wheezing or rhonchi     Musculoskeletal: Right lower leg: No edema  Left lower leg: No edema  Skin:     General: Skin is warm  Neurological:      Mental Status: Mental status is at baseline  Psychiatric:         Mood and Affect: Mood normal       Comments: Impaired memory         Pertinent Laboratory/Diagnostic Studies:    Laboratory studies reviewed personally by Hiren Patterson MD    BMP:   Lab Results   Component Value Date    SODIUM 140 06/21/2021    K 5 3 06/21/2021     06/21/2021    CO2 28 06/21/2021    BUN 15 06/21/2021    CREATININE 0 85 06/21/2021    GLUC 100 06/18/2021    CALCIUM 9 3 06/21/2021     CBC:  Lab Results   Component Value Date    WBC 5 75 06/21/2021    RBC 4 47 06/21/2021    HGB 13 7 06/21/2021    HCT 42 7 06/21/2021    MCV 96 06/21/2021    MCH 30 6 06/21/2021    RDW 13 9 06/21/2021     06/21/2021     Coags:    Lipid Profile:   No results found for: CHOL  Lab Results   Component Value Date    HDL 55 02/16/2021     Lab Results   Component Value Date    LDLCALC 121 (H) 02/16/2021     Lab Results   Component Value Date    TRIG 52 02/16/2021      Lab Results   Component Value Date    LUL9MGHGBBRR 1 570 02/16/2021     Lab Results   Component Value Date    ALT 36 06/15/2021    AST 24 06/15/2021       Imaging Studies:     No results found  Pastor Vieira MD, [facility] [place]    Portions of the record may have been created with voice recognition software  Occasional wrong word or "sound a like" substitutions may have occurred due to the inherent limitations of voice recognition software  Read the chart carefully and recognize, using context, where substitutions have occurred

## 2022-11-15 ENCOUNTER — APPOINTMENT (EMERGENCY)
Dept: CT IMAGING | Facility: HOSPITAL | Age: 82
End: 2022-11-15

## 2022-11-15 ENCOUNTER — HOSPITAL ENCOUNTER (EMERGENCY)
Facility: HOSPITAL | Age: 82
Discharge: HOME | End: 2022-11-15
Attending: INTERNAL MEDICINE

## 2022-11-15 VITALS
OXYGEN SATURATION: 100 % | BODY MASS INDEX: 25.34 KG/M2 | TEMPERATURE: 97.5 F | RESPIRATION RATE: 16 BRPM | SYSTOLIC BLOOD PRESSURE: 164 MMHG | HEART RATE: 68 BPM | WEIGHT: 143 LBS | DIASTOLIC BLOOD PRESSURE: 69 MMHG | HEIGHT: 63 IN

## 2022-11-15 DIAGNOSIS — K52.9 COLITIS: Primary | ICD-10-CM

## 2022-11-15 DIAGNOSIS — K29.70 GASTRITIS WITHOUT BLEEDING, UNSPECIFIED CHRONICITY, UNSPECIFIED GASTRITIS TYPE: ICD-10-CM

## 2022-11-15 LAB
ALBUMIN SERPL BCP-MCNC: 4.1 G/DL (ref 3.5–5)
ALP SERPL-CCNC: 72 U/L (ref 34–104)
ALT SERPL W P-5'-P-CCNC: 18 U/L (ref 7–52)
ANION GAP SERPL CALCULATED.3IONS-SCNC: 6 MMOL/L (ref 4–13)
AST SERPL W P-5'-P-CCNC: 19 U/L (ref 13–39)
BASOPHILS # BLD AUTO: 0.01 THOUSANDS/ÂΜL (ref 0–0.1)
BASOPHILS NFR BLD AUTO: 0 % (ref 0–1)
BILIRUB SERPL-MCNC: 0.43 MG/DL (ref 0.2–1)
BUN SERPL-MCNC: 16 MG/DL (ref 5–25)
CALCIUM SERPL-MCNC: 9.1 MG/DL (ref 8.4–10.2)
CARDIAC TROPONIN I PNL SERPL HS: 3 NG/L (ref 8–18)
CHLORIDE SERPL-SCNC: 104 MMOL/L (ref 96–108)
CO2 SERPL-SCNC: 30 MMOL/L (ref 21–32)
CREAT SERPL-MCNC: 0.73 MG/DL (ref 0.6–1.3)
EOSINOPHIL # BLD AUTO: 0.01 THOUSAND/ÂΜL (ref 0–0.61)
EOSINOPHIL NFR BLD AUTO: 0 % (ref 0–6)
ERYTHROCYTE [DISTWIDTH] IN BLOOD BY AUTOMATED COUNT: 13.5 % (ref 11.6–15.1)
FLUAV RNA RESP QL NAA+PROBE: NEGATIVE
FLUBV RNA RESP QL NAA+PROBE: NEGATIVE
GFR SERPL CREATININE-BSD FRML MDRD: 86 ML/MIN/1.73SQ M
GLUCOSE SERPL-MCNC: 129 MG/DL (ref 65–140)
HCT VFR BLD AUTO: 41.8 % (ref 36.5–49.3)
HGB BLD-MCNC: 13.9 G/DL (ref 12–17)
IMM GRANULOCYTES # BLD AUTO: 0.02 THOUSAND/UL (ref 0–0.2)
IMM GRANULOCYTES NFR BLD AUTO: 0 % (ref 0–2)
LIPASE SERPL-CCNC: 78 U/L (ref 11–82)
LYMPHOCYTES # BLD AUTO: 0.83 THOUSANDS/ÂΜL (ref 0.6–4.47)
LYMPHOCYTES NFR BLD AUTO: 14 % (ref 14–44)
MCH RBC QN AUTO: 29.3 PG (ref 26.8–34.3)
MCHC RBC AUTO-ENTMCNC: 33.3 G/DL (ref 31.4–37.4)
MCV RBC AUTO: 88 FL (ref 82–98)
MONOCYTES # BLD AUTO: 0.17 THOUSAND/ÂΜL (ref 0.17–1.22)
MONOCYTES NFR BLD AUTO: 3 % (ref 4–12)
NEUTROPHILS # BLD AUTO: 4.8 THOUSANDS/ÂΜL (ref 1.85–7.62)
NEUTS SEG NFR BLD AUTO: 83 % (ref 43–75)
NRBC BLD AUTO-RTO: 0 /100 WBCS
PLATELET # BLD AUTO: 178 THOUSANDS/UL (ref 149–390)
PMV BLD AUTO: 9.9 FL (ref 8.9–12.7)
POTASSIUM SERPL-SCNC: 3.8 MMOL/L (ref 3.5–5.3)
PROT SERPL-MCNC: 6.9 G/DL (ref 6.4–8.4)
RBC # BLD AUTO: 4.74 MILLION/UL (ref 3.88–5.62)
RSV RNA RESP QL NAA+PROBE: NEGATIVE
SARS-COV-2 RNA RESP QL NAA+PROBE: NEGATIVE
SODIUM SERPL-SCNC: 140 MMOL/L (ref 135–147)
WBC # BLD AUTO: 5.84 THOUSAND/UL (ref 4.31–10.16)

## 2022-11-15 RX ORDER — SODIUM CHLORIDE 9 MG/ML
125 INJECTION, SOLUTION INTRAVENOUS CONTINUOUS
Status: DISCONTINUED | OUTPATIENT
Start: 2022-11-15 | End: 2022-11-16 | Stop reason: HOSPADM

## 2022-11-15 RX ORDER — ONDANSETRON 2 MG/ML
4 INJECTION INTRAMUSCULAR; INTRAVENOUS ONCE
Status: COMPLETED | OUTPATIENT
Start: 2022-11-15 | End: 2022-11-15

## 2022-11-15 RX ORDER — FAMOTIDINE 10 MG/ML
20 INJECTION, SOLUTION INTRAVENOUS ONCE
Status: COMPLETED | OUTPATIENT
Start: 2022-11-15 | End: 2022-11-15

## 2022-11-15 RX ORDER — METRONIDAZOLE 500 MG/1
500 TABLET ORAL EVERY 8 HOURS SCHEDULED
Qty: 21 TABLET | Refills: 0 | Status: SHIPPED | OUTPATIENT
Start: 2022-11-15 | End: 2022-11-22

## 2022-11-15 RX ORDER — METRONIDAZOLE 500 MG/1
500 TABLET ORAL ONCE
Status: COMPLETED | OUTPATIENT
Start: 2022-11-15 | End: 2022-11-15

## 2022-11-15 RX ADMIN — SODIUM CHLORIDE 125 ML/HR: 0.9 INJECTION, SOLUTION INTRAVENOUS at 20:38

## 2022-11-15 RX ADMIN — IOHEXOL 100 ML: 350 INJECTION, SOLUTION INTRAVENOUS at 21:18

## 2022-11-15 RX ADMIN — METRONIDAZOLE 500 MG: 500 TABLET ORAL at 22:14

## 2022-11-15 RX ADMIN — ONDANSETRON 4 MG: 2 INJECTION INTRAMUSCULAR; INTRAVENOUS at 20:42

## 2022-11-15 RX ADMIN — FAMOTIDINE 20 MG: 10 INJECTION, SOLUTION INTRAVENOUS at 20:45

## 2022-11-16 LAB
ATRIAL RATE: 69 BPM
P AXIS: 44 DEGREES
PR INTERVAL: 172 MS
QRS AXIS: -35 DEGREES
QRSD INTERVAL: 136 MS
QT INTERVAL: 454 MS
QTC INTERVAL: 486 MS
T WAVE AXIS: 53 DEGREES
VENTRICULAR RATE: 69 BPM

## 2022-11-16 NOTE — ED PROVIDER NOTES
History  Chief Complaint   Patient presents with   • Vomiting     Pt complaining of vomiting and epigastric pain  A this is an 81years old brought by his daughter for having abdominal pain and vomiting  Daughter stated that he vomited 2 times and patient stated he moved his bowel x3  Patient has no fever  Daughter stated that this started on Saturday 3 days ago  Pt went to his PMD today and told to go to er  Pt sitting in no distress  Pt has extensive medical h/o of CAD, PAD, Dementia, osteomyelitis  Diverticulitis  Pt denies CP, SOB, and in no distesss  Pt has PO 97% on RA  Prior to Admission Medications   Prescriptions Last Dose Informant Patient Reported? Taking?    Apoaequorin (PREVAGEN PO)  Self Yes No   Sig: Take by mouth   FOLBIC 2 5-25-2 MG  Self Yes No   Sig: Take 1 tablet by mouth daily   aspirin 81 mg chewable tablet  Self No No   Sig: Chew 1 tablet (81 mg total) daily   atorvastatin (LIPITOR) 40 mg tablet  Self No No   Sig: Take 1 tablet (40 mg total) by mouth every evening   carvedilol (COREG) 6 25 mg tablet  Self No No   Sig: Take 1 tablet (6 25 mg total) by mouth every 12 (twelve) hours   cefuroxime (CEFTIN) 250 mg tablet  Self Yes No   famotidine (PEPCID) 40 MG tablet  Self No No   Sig: Take 1 tablet (40 mg total) by mouth daily   isosorbide mononitrate (IMDUR) 30 mg 24 hr tablet  Self No No   Sig: Take 1 tablet by mouth once daily   niacin (NIASPAN) 1000 MG CR tablet  Self Yes No   Sig: daily    nitroglycerin (NITROSTAT) 0 4 mg SL tablet  Self No No   Sig: Place 1 tablet (0 4 mg total) under the tongue every 5 (five) minutes as needed for chest pain   omega-3-acid ethyl esters (LOVAZA) 1 g capsule  Self Yes No   Sig: Take 2 g by mouth 2 (two) times a day   pantoprazole (PROTONIX) 20 mg tablet  Self Yes No   Sig: Take 20 mg by mouth daily      Facility-Administered Medications: None       Past Medical History:   Diagnosis Date   • Carotid artery disease (HCC)    • Claudication in peripheral vascular disease ([facility] [address] )    • High cholesterol        Past Surgical History:   Procedure Laterality Date   • BYPASS GRAFT     • CAROTID STENT     • COLONOSCOPY     • CORONARY ARTERY BYPASS GRAFT     • IR AORTAGRAM WITH RUN-OFF  2020   • [place] SLCTV CATHJ 3RD+ ORD SLCTV ABDL PEL/LXTR [address] [facility] Right 2020    Procedure: ARTERIOGRAM, RIGHT LEG ANGIOGRAM, BALLOON ANGIOPLASTY AND STENTING OF RIGHT SFA;  Surgeon: Isatu Diaz MD;  Location: [facility];  Service: Vascular   • VASCULAR SURGERY         Family History   Problem Relation Age of Onset   • Hypertension Family    • Heart disease Family    • Stroke Family    • No Known Problems Mother    • No Known Problems Father      I have reviewed and agree with the history as documented  E-Cigarette/Vaping   • E-Cigarette Use Never User      E-Cigarette/Vaping Substances   • Nicotine No    • THC No    • CBD No    • Flavoring No    • Other No    • Unknown No      Social History     Tobacco Use   • Smoking status: Former Smoker     Types: Cigarettes     Quit date:      Years since quittin 9   • Smokeless tobacco: Never Used   Vaping Use   • Vaping Use: Never used   Substance Use Topics   • Alcohol use: Yes     Comment: social    • Drug use: Never       Review of Systems   Constitutional: Negative for diaphoresis, fatigue and fever  HENT: Positive for congestion  Negative for ear pain, hearing loss, rhinorrhea, sinus pressure, sinus pain, sneezing, tinnitus and trouble swallowing  Respiratory: Negative for cough, chest tightness and shortness of breath  Cardiovascular: Negative for chest pain, palpitations and leg swelling  Gastrointestinal: Positive for abdominal pain and vomiting  Negative for blood in stool, diarrhea and nausea  Endocrine: Negative for polydipsia, polyphagia and polyuria  Genitourinary: Negative for difficulty urinating, dysuria, flank pain, hematuria and scrotal swelling     Musculoskeletal: Negative for arthralgias, back pain, gait problem, neck pain and neck stiffness  Skin: Negative for color change, pallor and rash  Neurological: Negative for dizziness, seizures, speech difficulty, weakness, light-headedness and headaches  Hematological: Negative for adenopathy  Does not bruise/bleed easily  Psychiatric/Behavioral: Negative for agitation and behavioral problems  Physical Exam  Physical Exam  Vitals and nursing note reviewed  Constitutional:       General: He is not in acute distress  Appearance: He is well-developed  He is not ill-appearing, toxic-appearing or diaphoretic  HENT:      Head: Normocephalic and atraumatic  Right Ear: Ear canal normal       Left Ear: Ear canal normal       Nose: Nose normal  No congestion or rhinorrhea  Mouth/Throat:      Mouth: Mucous membranes are moist       Pharynx: No oropharyngeal exudate or posterior oropharyngeal erythema  Eyes:      General: No scleral icterus  Right eye: No discharge  Left eye: No discharge  Extraocular Movements: Extraocular movements intact  Pupils: Pupils are equal, round, and reactive to light  Neck:      Vascular: No carotid bruit  Cardiovascular:      Rate and Rhythm: Normal rate and regular rhythm  Heart sounds: Normal heart sounds  No murmur heard  No friction rub  No gallop  Pulmonary:      Effort: Pulmonary effort is normal  No respiratory distress  Breath sounds: Normal breath sounds  No wheezing, rhonchi or rales  Chest:      Chest wall: No tenderness  Abdominal:      General: Bowel sounds are normal  There is no distension  Palpations: Abdomen is soft  There is no mass  Tenderness: There is no abdominal tenderness  There is no right CVA tenderness, left CVA tenderness or guarding  Musculoskeletal:         General: No swelling, tenderness, deformity or signs of injury  Normal range of motion  Cervical back: Normal range of motion and neck supple  No rigidity or tenderness  Right lower leg: No edema  Left lower leg: No edema  Lymphadenopathy:      Cervical: No cervical adenopathy  Skin:     General: Skin is warm and dry  Capillary Refill: Capillary refill takes less than 2 seconds  Coloration: Skin is not jaundiced or pale  Findings: No bruising, erythema, lesion or rash  Neurological:      Mental Status: He is alert and oriented to person, place, and time     Psychiatric:         Behavior: Behavior normal          Vital Signs  ED Triage Vitals [11/15/22 1951]   Temperature Pulse Respirations Blood Pressure SpO2   97 5 °F (36 4 °C) 65 18 (!) 173/94 97 %      Temp Source Heart Rate Source Patient Position - Orthostatic VS BP Location FiO2 (%)   Oral Monitor Sitting Left arm --      Pain Score       2           Vitals:    11/15/22 1951 11/15/22 2135   BP: (!) 173/94 164/69   Pulse: 65 68   Patient Position - Orthostatic VS: Sitting Lying         Visual Acuity      ED Medications  Medications   sodium chloride 0 9 % infusion (0 mL/hr Intravenous Stopped 11/15/22 2214)   ondansetron (ZOFRAN) injection 4 mg (4 mg Intravenous Given 11/15/22 2042)   Famotidine (PF) (PEPCID) injection 20 mg (20 mg Intravenous Given 11/15/22 2045)   iohexol (OMNIPAQUE) 350 MG/ML injection (SINGLE-DOSE) 100 mL (100 mL Intravenous Given 11/15/22 2118)   metroNIDAZOLE (FLAGYL) tablet 500 mg (500 mg Oral Given 11/15/22 2214)       Diagnostic Studies  Results Reviewed     Procedure Component Value Units Date/Time    High Sensitivity Troponin I Random [[id number]]  (Abnormal) Collected: 11/15/22 2034    Lab Status: Final result Specimen: Blood from Arm, Left Updated: 11/15/22 2128     HS TnI random 3 ng/L     FLU/RSV/COVID - if FLU/RSV clinically relevant [[id number]]  (Normal) Collected: 11/15/22 2023    Lab Status: Final result Specimen: Nares from Nose Updated: 11/15/22 2113     SARS-CoV-2 Negative     INFLUENZA A PCR Negative     INFLUENZA B PCR Negative RSV PCR Negative    Narrative:      FOR PEDIATRIC PATIENTS - copy/paste COVID Guidelines URL to browser: [email or web address]    SARS-CoV-2 assay is a Nucleic Acid Amplification assay intended for the  qualitative detection of nucleic acid from SARS-CoV-2 in nasopharyngeal  swabs  Results are for the presumptive identification of SARS-CoV-2 RNA  Positive results are indicative of infection with SARS-CoV-2, the virus  causing COVID-19, but do not rule out bacterial infection or co-infection  with other viruses  Laboratories within the [place] and its  territories are required to report all positive results to the appropriate  public health authorities  Negative results do not preclude SARS-CoV-2  infection and should not be used as the sole basis for treatment or other  patient management decisions  Negative results must be combined with  clinical observations, patient history, and epidemiological information  This test has not been FDA cleared or approved  This test has been authorized by FDA under an Emergency Use Authorization  (EUA)  This test is only authorized for the duration of time the  declaration that circumstances exist justifying the authorization of the  emergency use of an in vitro diagnostic tests for detection of SARS-CoV-2  virus and/or diagnosis of COVID-19 infection under section 564(b)(1) of  the Act, 21 U  S C  510MFL-3(E)(0), unless the authorization is terminated  or revoked sooner  The test has been validated but independent review by FDA  and CLIA is pending  Test performed using [employer] GeneXpert: This RT-PCR assay targets N2,  a region unique to SARS-CoV-2  A conserved region in the E-gene was chosen  for pan-Sarbecovirus detection which includes SARS-CoV-2  According to CMS-2020-01-R, this platform meets the definition of high-[employer] technology      Comprehensive metabolic panel [[id number]] Collected: 11/15/22 2034 Lab Status: Final result Specimen: Blood from Arm, Left Updated: 11/15/22 2056     Sodium 140 mmol/L      Potassium 3 8 mmol/L      Chloride 104 mmol/L      CO2 30 mmol/L      ANION GAP 6 mmol/L      BUN 16 mg/dL      Creatinine 0 73 mg/dL      Glucose 129 mg/dL      Calcium 9 1 mg/dL      AST 19 U/L      ALT 18 U/L      Alkaline Phosphatase 72 U/L      Total Protein 6 9 g/dL      Albumin 4 1 g/dL      Total Bilirubin 0 43 mg/dL      eGFR 86 ml/min/1 73sq m     Narrative:      National Kid[employer] Disease Foundation guidelines for Chronic Kidney Disease (CKD):   •  Stage 1 with normal or high GFR (GFR > 90 mL/min/1 73 square meters)  •  Stage 2 Mild CKD (GFR = 60-89 mL/min/1 73 square meters)  •  Stage 3A Moderate CKD (GFR = 45-59 mL/min/1 73 square meters)  •  Stage 3B Moderate CKD (GFR = 30-44 mL/min/1 73 square meters)  •  Stage 4 Severe CKD (GFR = 15-29 mL/min/1 73 square meters)  •  Stage 5 End Stage CKD (GFR <15 mL/min/1 73 square meters)  Note: GFR calculation is accurate only with a steady state creatinine    Lipase [[id number]]  (Normal) Collected: 11/15/22 2034    Lab Status: Final result Specimen: Blood from Arm, Left Updated: 11/15/22 2056     Lipase 78 u/L     CBC and differential [[id number]]  (Abnormal) Collected: 11/15/22 2034    Lab Status: Final result Specimen: Blood from Arm, Left Updated: 11/15/22 2039     WBC 5 84 Thousand/uL      RBC 4 74 Million/uL      Hemoglobin 13 9 g/dL      Hematocrit 41 8 %      MCV 88 fL      MCH 29 3 pg      MCHC 33 3 g/dL      RDW 13 5 %      MPV 9 9 fL      Platelets 514 Thousands/uL      nRBC 0 /100 WBCs      Neutrophils Relative 83 %      Immat GRANS % 0 %      Lymphocytes Relative 14 %      Monocytes Relative 3 %      Eosinophils Relative 0 %      Basophils Relative 0 %      Neutrophils Absolute 4 80 Thousands/µL      Immature Grans Absolute 0 02 Thousand/uL      Lymphocytes Absolute 0 83 Thousands/µL      Monocytes Absolute 0 17 Thousand/µL      Eosinophils Absolute 0 01 Thousand/µL      Basophils Absolute 0 01 Thousands/µL                  CT abdomen pelvis w contrast   Final Result by Leslee Howard DO (11/15 2138)      Thickening of the gastric wall which may represent gastritis  Thickening of the sigmoid colon and cecum wall which may represent colitis  Follow-up with gastroenterology is recommended  Workstation performed: [id number]                    Procedures  ECG 12 Lead Documentation Only    Date/Time: 11/15/2022 10:09 PM  Performed by: Keren Gonzalez MD  Authorized by: Keren Gonzalez MD     Indications / Diagnosis:  Abdominal pain  Patient location:  ED  Interpretation:     Interpretation: abnormal    Rate:     ECG rate:  69    ECG rate assessment: normal    Rhythm:     Rhythm: sinus rhythm    Ectopy:     Ectopy: none    QRS:     QRS axis:  Left    QRS intervals: Wide  Conduction:     Conduction: abnormal      Abnormal conduction: non-specific intraventricular conduction delay    ST segments:     ST segments:  Normal             ED Course                                             MDM  Number of Diagnoses or Management Options  Diagnosis management comments: This is a 81years old brought by his daughter for having vomiting x2  Patient also having BM X3  PATIENT HAS MILD ABDOMINAL PAIN  PATIENT HAS NO FEVER  Patient has no chest pain, SOB, nausea  The patient has history of mild dementia  Physical exam came back normal   Blood work came back normal   CT abdomen pelvis shows thickening of the wall of the stomach which could be consistent with gastritis  Also he has thickening of the wall of the cecum and sigmoid colon which could be consistent with his colitis  Patient is already on pantoprazole daily  At this time are going to give him script for Flagyl for colitis and to follow-up with his GI  All question concerns of patient and his daughter I have been fully addressed         Amount and/or Complexity of Data Reviewed  Clinical lab tests: ordered and reviewed  Tests in the radiology section of CPT®: ordered and reviewed  Decide to obtain previous medical records or to obtain history from someone other than the patient: yes  Review and summarize past medical records: yes  Independent visualization of images, tracings, or specimens: yes    Risk of Complications, Morbidity, and/or Mortality  Presenting problems: moderate  Diagnostic procedures: moderate  Management options: low    Patient Progress  Patient progress: stable      Disposition  Final diagnoses:   Colitis   Gastritis without bleeding, unspecified chronicity, unspecified gastritis type     Time reflects when diagnosis was documented in both MDM as applicable and the Disposition within this note     Time User Action Codes Description Comment    11/15/2022  9:58 PM Sonia Martins [K52 9] Colitis     11/15/2022  9:58 PM Sonia Martins [K29 70] Gastritis without bleeding, unspecified chronicity, unspecified gastritis type       ED Disposition     ED Disposition   Discharge    Condition   Stable    Date/Time   Tue Nov 15, 2022  9:58 PM    Comment   Lavern Weinstein discharge to home/self care                 Follow-up Information     Follow up With Specialties Details Why Saskia Hirsch MD  In 3 days  [address] [place] [address]  [phone]            Discharge Medication List as of 11/15/2022 10:00 PM      START taking these medications    Details   metroNIDAZOLE (FLAGYL) 500 mg tablet Take 1 tablet (500 mg total) by mouth every 8 (eight) hours for 7 days, Starting Tue 11/15/2022, Until Tue 11/22/2022, Normal         CONTINUE these medications which have NOT CHANGED    Details   Apoaequorin (PREVAGEN PO) Take by mouth, Historical Med      aspirin 81 mg chewable tablet Chew 1 tablet (81 mg total) daily, Starting Sat 6/19/2021, Print      atorvastatin (LIPITOR) 40 mg tablet Take 1 tablet (40 mg total) by mouth every evening, Starting Fri 6/18/2021, Normal carvedilol (COREG) 6 25 mg tablet Take 1 tablet (6 25 mg total) by mouth every 12 (twelve) hours, Starting Fri 6/18/2021, Normal      cefuroxime (CEFTIN) 250 mg tablet Starting Thu 6/16/2022, Historical Med      famotidine (PEPCID) 40 MG tablet Take 1 tablet (40 mg total) by mouth daily, Starting Tue 4/26/2022, Until Mon 11/14/2022, Normal      FOLBIC 2 5-25-2 MG Take 1 tablet by mouth daily, Starting Wed 6/26/2019, Historical Med      isosorbide mononitrate (IMDUR) 30 mg 24 hr tablet Take 1 tablet by mouth once daily, Normal      niacin (NIASPAN) 1000 MG CR tablet daily , Starting Fri 5/3/2019, Historical Med      nitroglycerin (NITROSTAT) 0 4 mg SL tablet Place 1 tablet (0 4 mg total) under the tongue every 5 (five) minutes as needed for chest pain, Starting Fri 6/4/2021, Normal      omega-3-acid ethyl esters (LOVAZA) 1 g capsule Take 2 g by mouth 2 (two) times a day, Historical Med      pantoprazole (PROTONIX) 20 mg tablet Take 20 mg by mouth daily, Starting Wed 5/25/2022, Historical Med             No discharge procedures on file      PDMP Review     None          ED Provider  Electronically Signed by           Brett Aponte MD  11/15/22 6578

## 2022-11-16 NOTE — DISCHARGE INSTRUCTIONS
Take medications as prescribed  Continue pantoprazole daily  Follow up with dr Elana Ariza  Labs Reviewed   CBC AND DIFFERENTIAL - Abnormal       Result Value Ref Range Status    WBC 5 84  4 31 - 10 16 Thousand/uL Final    RBC 4 74  3 88 - 5 62 Million/uL Final    Hemoglobin 13 9  12 0 - 17 0 g/dL Final    Hematocrit 41 8  36 5 - 49 3 % Final    MCV 88  82 - 98 fL Final    MCH 29 3  26 8 - 34 3 pg Final    MCHC 33 3  31 4 - 37 4 g/dL Final    RDW 13 5  11 6 - 15 1 % Final    MPV 9 9  8 9 - 12 7 fL Final    Platelets 123  488 - 390 Thousands/uL Final    nRBC 0  /100 WBCs Final    Neutrophils Relative 83 (*) 43 - 75 % Final    Immat GRANS % 0  0 - 2 % Final    Lymphocytes Relative 14  14 - 44 % Final    Monocytes Relative 3 (*) 4 - 12 % Final    Eosinophils Relative 0  0 - 6 % Final    Basophils Relative 0  0 - 1 % Final    Neutrophils Absolute 4 80  1 85 - 7 62 Thousands/µL Final    Immature Grans Absolute 0 02  0 00 - 0 20 Thousand/uL Final    Lymphocytes Absolute 0 83  0 60 - 4 47 Thousands/µL Final    Monocytes Absolute 0 17  0 17 - 1 22 Thousand/µL Final    Eosinophils Absolute 0 01  0 00 - 0 61 Thousand/µL Final    Basophils Absolute 0 01  0 00 - 0 10 Thousands/µL Final   HIGH SENSITIVITY TROPONIN I RANDOM - Abnormal    HS TnI random 3 (*) 8 - 18 ng/L Final    Comment:                                              Initial (time 0) result  If >=50 ng/L, Myocardial injury suggested ;  Type of myocardial injury and treatment strategy  to be determined  If 5-49 ng/L, a delta result at 2 hours and or 4 hours will be needed to further evaluate  If <4 ng/L, and chest pain has been >3 hours since onset, patient may qualify for discharge based on the HEART score in the ED  If <5 ng/L and <3hours since onset of chest pain, a delta result at 2 hours will be needed to further evaluate  HS Troponin 99th Percentile URL of a Health Population=12 ng/L with a 95% Confidence Interval of 8-18 ng/L      Second Troponin (time 2 hours)  If calculated delta >= 20 ng/L,  Myocardial injury suggested ; Type of myocardial injury and treatment strategy to be determined  If 5-49 ng/L and the calculated delta is 5-19 ng/L, consult medical service for evaluation  Continue evaluation for ischemia on ecg and other possible etiology and repeat hs troponin at 4 hours  If delta is <5 ng/L at 2 hours, consider discharge based on risk stratification via the HEART score (if in ED), or JUAN CARLOS risk score in IP/Observation  HS Troponin 99th Percentile URL of a Health Population=12 ng/L with a 95% Confidence Interval of 8-18 ng/L    COVID19, INFLUENZA A/B, RSV PCR, [facility] - Normal    SARS-CoV-2 Negative  Negative Final    INFLUENZA A PCR Negative  Negative Final    INFLUENZA B PCR Negative  Negative Final    RSV PCR Negative  Negative Final    Narrative:     FOR PEDIATRIC PATIENTS - copy/paste COVID Guidelines URL to browser: [email or web address]    SARS-CoV-2 assay is a Nucleic Acid Amplification assay intended for the  qualitative detection of nucleic acid from SARS-CoV-2 in nasopharyngeal  swabs  Results are for the presumptive identification of SARS-CoV-2 RNA  Positive results are indicative of infection with SARS-CoV-2, the virus  causing COVID-19, but do not rule out bacterial infection or co-infection  with other viruses  Laboratories within the [place] and its  territories are required to report all positive results to the appropriate  public health authorities  Negative results do not preclude SARS-CoV-2  infection and should not be used as the sole basis for treatment or other  patient management decisions  Negative results must be combined with  clinical observations, patient history, and epidemiological information  This test has not been FDA cleared or approved  This test has been authorized by FDA under an Emergency Use Authorization  (EUA)   This test is only authorized for the duration of time the  declaration that circumstances exist justifying the authorization of the  emergency use of an in vitro diagnostic tests for detection of SARS-CoV-2  virus and/or diagnosis of COVID-19 infection under section 564(b)(1) of  the Act, 21 U  S C  206JTV-0(X)(4), unless the authorization is terminated  or revoked sooner  The test has been validated but independent review by FDA  and CLIA is pending  Test performed using [employer] Gene[employer]pert: This RT-PCR assay targets N2,  a region unique to SARS-CoV-2  A conserved region in the E-gene was chosen  for pan-Sarbecovirus detection which includes SARS-CoV-2  According to CMS-2020-01-R, this platform meets the definition of high-[employer] technology  LIPASE - Normal    Lipase 78  11 - 82 u/L Final   COMPREHENSIVE METABOLIC PANEL    Sodium 261  135 - 147 mmol/L Final    Potassium 3 8  3 5 - 5 3 mmol/L Final    Chloride 104  96 - 108 mmol/L Final    CO2 30  21 - 32 mmol/L Final    ANION GAP 6  4 - 13 mmol/L Final    BUN 16  5 - 25 mg/dL Final    Creatinine 0 73  0 60 - 1 30 mg/dL Final    Comment: Standardized to IDMS reference method    Glucose 129  65 - 140 mg/dL Final    Comment: If the patient is fasting, the ADA then defines impaired fasting glucose as > 100 mg/dL and diabetes as > or equal to 123 mg/dL  Specimen collection should occur prior to Sulfasalazine administration due to the potential for falsely depressed results  Specimen collection should occur prior to Sulfapyridine administration due to the potential for falsely elevated results  Calcium 9 1  8 4 - 10 2 mg/dL Final    AST 19  13 - 39 U/L Final    Comment: Specimen collection should occur prior to Sulfasalazine administration due to the potential for falsely depressed results  ALT 18  7 - 52 U/L Final    Comment: Specimen collection should occur prior to Sulfasalazine administration due to the potential for falsely depressed results       Alkaline Phosphatase 72  34 - 104 U/L Final    Total Protein 6 9  6 4 - 8 4 g/dL Final    Albumin 4 1  3 5 - 5 0 g/dL Final    Total Bilirubin 0 43  0 20 - 1 00 mg/dL Final    eGFR 86  ml/min/1 73sq m Final    Narrative:     [place] guidelines for Chronic Kidney Disease (CKD):     Stage 1 with normal or high GFR (GFR > 90 mL/min/1 73 square meters)    Stage 2 Mild CKD (GFR = 60-89 mL/min/1 73 square meters)    Stage 3A Moderate CKD (GFR = 45-59 mL/min/1 73 square meters)    Stage 3B Moderate CKD (GFR = 30-44 mL/min/1 73 square meters)    Stage 4 Severe CKD (GFR = 15-29 mL/min/1 73 square meters)    Stage 5 End Stage CKD (GFR <15 mL/min/1 73 square meters)  Note: GFR calculation is accurate only with a steady state creatinine     CT abdomen pelvis w contrast   Final Result      Thickening of the gastric wall which may represent gastritis  Thickening of the sigmoid colon and cecum wall which may represent colitis  Follow-up with gastroenterology is recommended              Workstation performed: [id number]

## 2023-01-04 NOTE — ASSESSMENT & PLAN NOTE
ANTICOAGULATION MANAGEMENT     Parish Bills 83 year old male is on warfarin with therapeutic INR result. (Goal INR 2.0-3.0)    Recent labs: (last 7 days)     01/04/23  1054   INR 2.3*       ASSESSMENT       Source(s): Chart Review, Patient/Caregiver Call and Template       Warfarin doses taken: Warfarin taken as instructed    Diet: No new diet changes identified    New illness, injury, or hospitalization: No    Medication/supplement changes: None noted    Signs or symptoms of bleeding or clotting: No    Previous INR: Therapeutic last visit; previously outside of goal range    Additional findings: None       PLAN     Recommended plan for no diet, medication or health factor changes affecting INR     Dosing Instructions: Continue your current warfarin dose with next INR in 4 weeks       Summary  As of 1/4/2023    Full warfarin instructions:  7.5 mg every Tue, Fri; 5 mg all other days   Next INR check:  2/1/2023             Telephone call with Parish who verbalizes understanding and agrees to plan    Lab visit scheduled    Education provided:     Goal range and lab monitoring: goal range and significance of current result    Plan made per [facility] anticoagulation protocol    Mary Doll RN  Anticoagulation Clinic  1/4/2023    _______________________________________________________________________     Anticoagulation Episode Summary     Current INR goal:  2.0-3.0   TTR:  71.7 % (1 y)   Target end date:  Indefinite   Send IN[facility] reminders [facility]:  [facility]    Indications    Chronic atrial fibrillation (H) [I48.20]  H/O mitral valve repair [Z98.890]  S/P AVR [Z95.2]  A-fib (H) (Resolved) [I48.91]           Comments:           Anticoagulation Care Providers     Provider Role Specialty Phone number    Isidro Au MD Referring Family Medicine [phone]           History of right leg claudication and asymptomatic bilateral carotid artery stenosis  Following  with vascular surgery  Asymptomatic   Continue atorvastatin, aspirin

## 2023-01-19 ENCOUNTER — HOSPITAL ENCOUNTER (OUTPATIENT)
Dept: NON INVASIVE DIAGNOSTICS | Facility: CLINIC | Age: 83
Discharge: HOME | End: 2023-01-19

## 2023-01-19 DIAGNOSIS — I73.9 PAD (PERIPHERAL ARTERY DISEASE) (HCC): ICD-10-CM

## 2023-02-08 ENCOUNTER — TELEPHONE (OUTPATIENT)
Dept: VASCULAR SURGERY | Facility: CLINIC | Age: 83
End: 2023-02-08

## 2023-04-26 ENCOUNTER — OFFICE VISIT (OUTPATIENT)
Dept: VASCULAR SURGERY | Facility: CLINIC | Age: 83
End: 2023-04-26

## 2023-04-26 VITALS
HEART RATE: 70 BPM | SYSTOLIC BLOOD PRESSURE: 138 MMHG | OXYGEN SATURATION: 93 % | WEIGHT: 149 LBS | HEIGHT: 63 IN | DIASTOLIC BLOOD PRESSURE: 90 MMHG | BODY MASS INDEX: 26.4 KG/M2

## 2023-04-26 DIAGNOSIS — I70.211 ATHEROSCLEROSIS OF NATIVE ARTERY OF RIGHT LOWER EXTREMITY WITH INTERMITTENT CLAUDICATION (HCC): ICD-10-CM

## 2023-04-26 DIAGNOSIS — I65.23 ASYMPTOMATIC BILATERAL CAROTID ARTERY STENOSIS: ICD-10-CM

## 2023-04-26 DIAGNOSIS — I73.9 PAD (PERIPHERAL ARTERY DISEASE) (HCC): Primary | ICD-10-CM

## 2023-04-26 NOTE — PROGRESS NOTES
"Assessment/Plan:    Atheroscler native arteries the extremities w/intermit claudication (HCC)  Right leg SFA stents are patent with good flow  There is a stable right common femoral artery stenosis with calcium plaque  As he is asymptomatic I do not recommend any treatment there  Continue daily aspirin and atorvastatin  Yearly doppler for follow up  Watch for any worsening pain, discoloration in the foot  Asymptomatic bilateral carotid artery stenosis  Bilateral carotid plaque   Continue daily aspirin and statin  Diagnoses and all orders for this visit:    PAD (peripheral artery disease) ([facility])  -     VAS lower limb arterial duplex, complete bilateral; Future    Asymptomatic bilateral carotid artery stenosis    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)  -     VAS lower limb arterial duplex, complete bilateral; Future        Subjective:      Patient ID: Salinas Ariza is a 81 y o  male  Pt here to review ALPHONSE  He denies any pain, numbness/tingling, or wounds  HPI  History obtained from his daughter   He walks without any discomfort   He is taking daily aspirin  No neuro symptoms such as sudden upper or lower ext weakness, numbness, aphasia, dysarthria, imbalance  The following portions of the patient's history were reviewed and updated as appropriate: allergies, current medications, past family history, past medical history, past social history, past surgical history and problem list     Review of Systems   Musculoskeletal: Negative  Neurological: Negative  I have reviewed the ROS as entered and made changes as necessary  Objective:      /90 (BP Location: Right arm, Patient Position: Sitting)   Pulse 70   Ht 5' 2 5\" (1 588 m)   Wt 67 6 kg (149 lb)   SpO2 93%   BMI 26 82 kg/m²          Physical Exam  Vitals and nursing note reviewed  HENT:      Head: Normocephalic and atraumatic     Cardiovascular:      Rate and " Rhythm: Normal rate  Pulses:           Femoral pulses are 2+ on the right side and 2+ on the left side  Dorsalis pedis pulses are detected w/ Doppler on the right side and detected w/ Doppler on the left side  Posterior tibial pulses are detected w/ Doppler on the right side and detected w/ Doppler on the left side  Comments: Triphasic DP and PT both legs  Abdominal:      General: There is no distension  Palpations: Abdomen is soft  There is no mass  Musculoskeletal:      Right lower leg: No edema  Left lower leg: No edema  Skin:     Capillary Refill: Capillary refill takes less than 2 seconds  Comments: Lichen planus   Neurological:      General: No focal deficit present  Mental Status: He is alert  He is confused

## 2023-04-26 NOTE — ASSESSMENT & PLAN NOTE
Right leg SFA stents are patent with good flow  There is a stable right common femoral artery stenosis with calcium plaque  As he is asymptomatic I do not recommend any treatment there  Continue daily aspirin and atorvastatin  Yearly doppler for follow up  Watch for any worsening pain, discoloration in the foot

## 2023-06-19 ENCOUNTER — OFFICE VISIT (OUTPATIENT)
Dept: CARDIOLOGY CLINIC | Facility: MEDICAL CENTER | Age: 83
End: 2023-06-19
Payer: MEDICARE

## 2023-06-19 VITALS
WEIGHT: 148 LBS | HEART RATE: 79 BPM | SYSTOLIC BLOOD PRESSURE: 120 MMHG | HEIGHT: 63 IN | DIASTOLIC BLOOD PRESSURE: 60 MMHG | OXYGEN SATURATION: 94 % | BODY MASS INDEX: 26.22 KG/M2

## 2023-06-19 DIAGNOSIS — I25.118 CORONARY ARTERY DISEASE OF NATIVE ARTERY OF NATIVE HEART WITH STABLE ANGINA PECTORIS (HCC): Primary | ICD-10-CM

## 2023-06-19 DIAGNOSIS — I70.211 ATHEROSCLEROSIS OF NATIVE ARTERY OF RIGHT LOWER EXTREMITY WITH INTERMITTENT CLAUDICATION (HCC): ICD-10-CM

## 2023-06-19 DIAGNOSIS — Z95.1 HX OF CABG: ICD-10-CM

## 2023-06-19 DIAGNOSIS — I73.9 PAD (PERIPHERAL ARTERY DISEASE) (HCC): ICD-10-CM

## 2023-06-19 PROCEDURE — 99214 OFFICE O/P EST MOD 30 MIN: CPT | Performed by: INTERNAL MEDICINE

## 2023-06-19 NOTE — PROGRESS NOTES
[facility] MIGEL [address] [place] [address]  Phone#  [phone]  Fax#  [phone]  [facility]             NAME: Chester Garcia  AGE: 81 y o  SEX: male   : 1940   MRN: [id number]    DATE: 2023  TIME: 9:54 AM    Cardiology Problem list:  CAD s/p CABG x3 in : Angina: SPECT:  EF 52%, moderate-sized, mildly severe, partially reversible myocardial perfusion defect of the apical apical and anterior wall  CATH: : LM severe disease, Vein graft to the LAD is occluded, but the LAD fills well from a LIMA to D1  SVG to OM1 is patent  The RCA was severely calcified and diseased: PCI of prox and mid RCA with DESx2  PVD status post SFA stent  : Carotid Doppler: Mild disease  Dementia    Assessment and plan:    Coronary artery disease: Status post CABG  Stable anginal symptoms  Last cardiac cath reviewed  EF was normal on last nuclear stress test   Continue ongoing medical therapy  Remains fairly active  Peripheral arterial disease:  He has had some progression of lower extremity vascular disease on doppler  but no rest pain, ulceration or skin breakdown reported  Stable claudication  Seen vascular   Dyslipidemia:  Lipids are managed by primary care  No side effects from statin therapy  Dementia  He is more forgetful now  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  HTN  BP Readings from Last 3 Encounters:   23 120/60   23 138/90   11/15/22 164/69   Continue current medications  Lifestyle modification  Close blood pressure monitoring  Chief Complaint   Patient presents with   • Follow-up     6 month f/up       HPI:    Chester Garcia is a 81y o -year-old male who presents to the cardiology clinic for follow up for the above-listed problems  He has history of CAD status post CABG    He had an abnormal nuclear stress test in  and subsequent cardiac catheterization revealed severe native multivessel CAD with patent LIMA to the diagonal that backfills the LAD and patent vein graft to the OM1  He underwent PCI of the calcified RCA with drug-eluting stents  Angina controlled on long-acting nitrates  Reports stable claudication  Previous vascular study from 01/23 showed stable SFA stenosis  Last carotid Doppler showed no significant disease 2020  He follows up with vascular surgery      Patient is doing well from a cardiac standpoint  No interim hospitalizations for cardiac causes  Current medications reviewed  Patient is doing well from a cardiovascular standpoint  No recent hospitalizations  Current medications reviewed  Reports compliance to medicines  No side effects reported  Denies chest pain on exertion  Denies worsening shortness of breath  Denies sustained palpitations  His memory has gone down further  Reports a rash that is better  Past history, family history, social history, current medications, vital signs, recent lab and imaging studies and  prior cardiology studies reviewed independently on this visit      Wt Readings from Last 3 Encounters:   06/19/23 67 1 kg (148 lb)   04/26/23 67 6 kg (149 lb)   04/05/23 64 9 kg (143 lb)     Pulse Readings from Last 3 Encounters:   06/19/23 79   04/26/23 70   11/15/22 68     BP Readings from Last 3 Encounters:   06/19/23 120/60   04/26/23 138/90   11/15/22 164/69       No Known Allergies    Current Outpatient Medications:   •  Apoaequorin (PREVAGEN PO), Take by mouth, Disp: , Rfl:   •  aspirin 81 mg chewable tablet, Chew 1 tablet (81 mg total) daily, Disp: 30 tablet, Rfl: 11  •  atorvastatin (LIPITOR) 40 mg tablet, Take 1 tablet (40 mg total) by mouth every evening, Disp: 30 tablet, Rfl: 3  •  carvedilol (COREG) 6 25 mg tablet, Take 1 tablet (6 25 mg total) by mouth every 12 (twelve) hours, Disp: 60 tablet, Rfl: 4  •  cefuroxime (CEFTIN) 250 mg tablet, , Disp: , Rfl:   •  FOLBIC 2 5-25-2 MG, Take 1 tablet by mouth daily, Disp: , Rfl: 4  •  isosorbide mononitrate (IMDUR) 30 mg 24 hr tablet, Take 1 tablet by mouth once daily, Disp: 30 tablet, Rfl: 0  •  niacin (NIASPAN) 1000 MG CR tablet, daily , Disp: , Rfl: 3  •  nitroglycerin (NITROSTAT) 0 4 mg SL tablet, Place 1 tablet (0 4 mg total) under the tongue every 5 (five) minutes as needed for chest pain, Disp: 25 tablet, Rfl: 11  •  omega-3-acid ethyl esters (LOVAZA) 1 g capsule, Take 2 g by mouth 2 (two) times a day, Disp: , Rfl:   •  pantoprazole (PROTONIX) 20 mg tablet, Take 20 mg by mouth daily, Disp: , Rfl:   •  famotidine (PEPCID) 40 MG tablet, Take 1 tablet (40 mg total) by mouth daily, Disp: 90 tablet, Rfl: 1    Past Medical History:   Diagnosis Date   • Carotid artery disease ([facility] [address] )    • Claudication in peripheral vascular disease [facility] )    • High cholesterol      Past Surgical History:   Procedure Laterality Date   • BYPASS GRAFT  1996   • CAROTID STENT  2012   • COLONOSCOPY     • CORONARY ARTERY BYPASS GRAFT  1995   • IR AORTAGRAM WITH RUN-OFF  7/2/2020   • IA SLCTV CATHJ 3RD+ ORD SLCTV ABDL PEL/LXTR [address] [facility] Right 7/2/2020    Procedure: ARTERIOGRAM, RIGHT LEG ANGIOGRAM, BALLOON ANGIOPLASTY AND STENTING OF RIGHT SFA;  Surgeon: Pedro Collins MD;  Location: [facility];  Service: Vascular   • VASCULAR SURGERY       Family History   Problem Relation Age of Onset   • Hypertension Family    • Heart disease Family    • Stroke Family    • No Known Problems Mother    • No Known Problems Father      Social History   reports that he quit smoking about 59 years ago  His smoking use included cigarettes  He has never used smokeless tobacco  He reports current alcohol use  He reports that he does not use drugs  Review of Systems   Constitutional: Positive for fatigue  Negative for fever  Respiratory: Negative for chest tightness, shortness of breath and wheezing  Cardiovascular: Negative for chest pain, palpitations and leg swelling  Musculoskeletal: Negative for myalgias  "  Skin: Negative for rash  Neurological: Negative for syncope  Hematological: Does not bruise/bleed easily  Psychiatric/Behavioral: Positive for decreased concentration  Objective:     Vitals:    06/19/23 0942   BP: 120/60   Pulse: 79   SpO2: 94%     Physical Exam  Vitals reviewed  Constitutional:       General: He is not in acute distress  HENT:      Head: Normocephalic  Cardiovascular:      Rate and Rhythm: Normal rate and regular rhythm  Heart sounds: S1 normal and S2 normal  Murmur heard  Systolic murmur is present with a grade of 2/6  Pulmonary:      Breath sounds: No wheezing or rhonchi  Musculoskeletal:      Right lower leg: No edema  Left lower leg: No edema  Skin:     General: Skin is warm  Findings: Rash present  Neurological:      Mental Status: He is alert  Mental status is at baseline     Psychiatric:         Mood and Affect: Mood normal          Pertinent Laboratory/Diagnostic Studies:    Laboratory studies reviewed personally by Gelacio Farnsworth MD    BMP:   Lab Results   Component Value Date    SODIUM 140 11/15/2022    K 3 8 11/15/2022     11/15/2022    CO2 30 11/15/2022    BUN 16 11/15/2022    CREATININE 0 73 11/15/2022    GLUC 129 11/15/2022    CALCIUM 9 1 11/15/2022     CBC:  Lab Results   Component Value Date    WBC 5 84 11/15/2022    RBC 4 74 11/15/2022    HGB 13 9 11/15/2022    HCT 41 8 11/15/2022    MCV 88 11/15/2022    MCH 29 3 11/15/2022    RDW 13 5 11/15/2022     11/15/2022     Coags:    Lipid Profile:   No results found for: \"CHOL\"  Lab Results   Component Value Date    HDL 55 02/16/2021     Lab Results   Component Value Date    LDLCALC 121 (H) 02/16/2021     Lab Results   Component Value Date    TRIG 52 02/16/2021      Other labs:  Lab Results   Component Value Date    XIX9PCEYQRDN 1 570 02/16/2021     Lab Results   Component Value Date    ALT 18 11/15/2022    AST 19 11/15/2022           Imaging Studies:     Pertinent imaging studies " "and cardiac studies were independently reviewed on this visit and findings summarized  Visit diagnoses  1  Coronary artery disease of native artery of native heart with stable angina pectoris ([facility] [address] )        2  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 ([facility] [address] )        3  Hx of CABG        4  PAD (peripheral artery disease) ([facility])            Rhea Daly MD, [facility]    Portions of the record may have been created with voice recognition software  Occasional wrong word or \"sound alike\" substitutions may have occurred due to the inherent limitations of voice recognition software  Read the chart carefully and recognize, using context, where substitutions have occurred  Please reach out to me directly for any clarifications    "

## 2024-04-01 ENCOUNTER — HOSPITAL ENCOUNTER (OUTPATIENT)
Dept: VASCULAR ULTRASOUND | Facility: HOSPITAL | Age: 84
Discharge: HOME | End: 2024-04-01
Attending: SURGERY
Payer: MEDICARE

## 2024-04-01 DIAGNOSIS — I70.211 ATHEROSCLEROSIS OF NATIVE ARTERY OF RIGHT LOWER EXTREMITY WITH INTERMITTENT CLAUDICATION (HCC): ICD-10-CM

## 2024-04-01 DIAGNOSIS — I73.9 PAD (PERIPHERAL ARTERY DISEASE) (HCC): ICD-10-CM

## 2024-04-01 PROCEDURE — 93922 UPR/L XTREMITY ART 2 LEVELS: CPT | Performed by: SURGERY

## 2024-04-01 PROCEDURE — 93925 LOWER EXTREMITY STUDY: CPT

## 2024-04-01 PROCEDURE — 93925 LOWER EXTREMITY STUDY: CPT | Performed by: SURGERY

## 2024-04-01 PROCEDURE — 93923 UPR/LXTR ART STDY 3+ LVLS: CPT

## 2024-05-17 ENCOUNTER — OFFICE VISIT (OUTPATIENT)
Dept: VASCULAR SURGERY | Facility: CLINIC | Age: 84
End: 2024-05-17
Payer: MEDICARE

## 2024-05-17 VITALS
HEART RATE: 72 BPM | BODY MASS INDEX: 27.23 KG/M2 | HEIGHT: 62 IN | OXYGEN SATURATION: 92 % | WEIGHT: 148 LBS | SYSTOLIC BLOOD PRESSURE: 118 MMHG | DIASTOLIC BLOOD PRESSURE: 78 MMHG | RESPIRATION RATE: 18 BRPM

## 2024-05-17 DIAGNOSIS — I65.23 ASYMPTOMATIC BILATERAL CAROTID ARTERY STENOSIS: ICD-10-CM

## 2024-05-17 DIAGNOSIS — I25.118 CORONARY ARTERY DISEASE OF NATIVE ARTERY OF NATIVE HEART WITH STABLE ANGINA PECTORIS (HCC): ICD-10-CM

## 2024-05-17 DIAGNOSIS — I73.9 PAD (PERIPHERAL ARTERY DISEASE) (HCC): Primary | ICD-10-CM

## 2024-05-17 PROCEDURE — 99214 OFFICE O/P EST MOD 30 MIN: CPT

## 2024-05-17 RX ORDER — OMEPRAZOLE 40 MG/1
40 CAPSULE, DELAYED RELEASE ORAL DAILY
COMMUNITY

## 2024-05-17 RX ORDER — VITAMIN E ACETATE 50 %
POWDER (GRAM) MISCELLANEOUS
COMMUNITY

## 2024-05-17 NOTE — ASSESSMENT & PLAN NOTE
Patient is denying any TIA/CVA symptoms (amaurosis fugax, slurred speech, facial droop, dysphagia, unilateral weakness, headache).    Plan:  -We discussed the pathophysiology of carotid artery stenosis as well as contributing lifestyle factors.  -As patient has not had a carotid duplex since 2020 we will obtain a repeat carotid duplex at patient's earliest convenience.  -Continue medical optimization with aspirin.  Patient's daughter would like to discuss restarting Lipitor after patient obtains a lipid panel  -Instructed patient to report to the ED for any TIA/CVA symptoms  -Follow up in the office in 1 year

## 2024-05-17 NOTE — ASSESSMENT & PLAN NOTE
Patient is currently denying any concerning symptoms.  He is denying any claudication, rest pain, numbness/tingling, color/temperature change, or tissue loss to his bilateral lower extremities.  Patient is ambulatory without the use of assistive devices.      Imaging:  LEAD 4/1/2024:  There is >75 % stenosis of the distal right CFA. Patent superficial femoral and popliteal artery stents.  50-75% stenosis in the left distal SFA/proximal popliteal artery with diffuse disease throughout the remaining femoral-popliteal arteries.  R JIA 0.91/158/66, L JIA 1.07/146/71    Plan:  -We discussed the pathophysiology of peripheral arterial disease as well as contributing lifestyle factors.  -We discussed the results of LEAD at length.  No significant change in disease process.  -Continue medical optimization with aspirin.  -We discussed the importance of taking atorvastatin and preventing stent occlusion.  We will check a lipid panel per patient daughter's request.  -Will repeat LEAD in 1 year  -Instructed patient to notify office of any claudication, rest pain, or tissue loss  -Instructed patient to report to the ED for any symptoms of acute limb ischemia (color/temperature change, motor/sensory loss, tissue loss).  -Follow up in the office in 1 year

## 2024-05-17 NOTE — PROGRESS NOTES
Ambulatory Visit  Name: Carlos Randolph      : 1940      MRN: [id number]  Encounter Provider: JACOBO Godinez  Encounter Date: 2024   Encounter department: [facility] [place]    Assessment & Plan   1. PAD (peripheral artery disease) ([facility])  Assessment & Plan:  Patient is currently denying any concerning symptoms.  He is denying any claudication, rest pain, numbness/tingling, color/temperature change, or tissue loss to his bilateral lower extremities.  Patient is ambulatory without the use of assistive devices.      Imaging:  LEAD 2024:  There is >75 % stenosis of the distal right CFA. Patent superficial femoral and popliteal artery stents.  50-75% stenosis in the left distal SFA/proximal popliteal artery with diffuse disease throughout the remaining femoral-popliteal arteries.  R JIA 0.91/158/66, L JIA 1.07/146/71    Plan:  -We discussed the pathophysiology of peripheral arterial disease as well as contributing lifestyle factors.  -We discussed the results of LEAD at length.  No significant change in disease process.  -Continue medical optimization with aspirin.  -We discussed the importance of taking atorvastatin and preventing stent occlusion.  We will check a lipid panel per patient daughter's request.  -Will repeat LEAD in 1 year  -Instructed patient to notify office of any claudication, rest pain, or tissue loss  -Instructed patient to report to the ED for any symptoms of acute limb ischemia (color/temperature change, motor/sensory loss, tissue loss).  -Follow up in the office in 1 year    Orders:  -     Lipid panel; Future  -     Comprehensive metabolic panel; Future  -     VAS ARTERIAL DUPLEX- LOWER LIMB BILATERAL; Future; Expected date: 2025  2. Coronary artery disease of native artery of native heart with stable angina pectoris (HCC)  -     Lipid panel; Future  -     CBC and Platelet; Future  3. Asymptomatic bilateral carotid artery stenosis  Assessment &  Plan:  Patient is denying any TIA/CVA symptoms (amaurosis fugax, slurred speech, facial droop, dysphagia, unilateral weakness, headache).    Plan:  -We discussed the pathophysiology of carotid artery stenosis as well as contributing lifestyle factors.  -As patient has not had a carotid duplex since 2020 we will obtain a repeat carotid duplex at patient's earliest convenience.  -Continue medical optimization with aspirin.  Patient's daughter would like to discuss restarting Lipitor after patient obtains a lipid panel  -Instructed patient to report to the ED for any TIA/CVA symptoms  -Follow up in the office in 1 year    Orders:  -     VAS carotid complete study; Future; Expected date: 05/17/2024      History of Present Illness     Carlos Randolph is a 84 y.o. male, former smoker, with PMH CAD s/p CABG, HLD, dementia, asymptomatic bilateral carotid artery stenosis, and PAD s/p right SFA and popliteal artery stenting.  Patient is presenting to the vascular surgery office to review results of LEAD completed 4/1/2024.  Patient is accompanied by his daughter and caregiver, who are assisting with answering questions.    Patient is currently denying any concerning symptoms.  He is denying any claudication, rest pain, numbness/tingling, color/temperature change, or tissue loss to his bilateral lower extremities.  Patient is ambulatory without the use of assistive devices.  Patient is denying any TIA/CVA symptoms (amaurosis fugax, slurred speech, facial droop, dysphagia, unilateral weakness, headache).  Per patient's daughter he has not been taking atorvastatin for several months, however he remains on aspirin 81 mg daily.    Review of Systems   Constitutional: Negative.    HENT: Negative.  Negative for trouble swallowing.    Eyes: Negative.  Negative for visual disturbance.   Respiratory: Negative.  Negative for shortness of breath.    Cardiovascular: Negative.  Negative for chest pain.   Gastrointestinal: Negative.     Endocrine: Negative.    Genitourinary: Negative.    Musculoskeletal:  Positive for arthralgias (bilateral knee pain).   Skin: Negative.  Negative for color change, pallor and wound.   Allergic/Immunologic: Negative.    Neurological: Negative.  Negative for facial asymmetry, speech difficulty, weakness, numbness and headaches.   Hematological: Negative.    Psychiatric/Behavioral:  Positive for confusion.      Pertinent Medical History   Past Medical History:   Diagnosis Date    Carotid artery disease (HCC)     Claudication in peripheral vascular disease (HCC)     High cholesterol      Medical History Reviewed by provider this encounter:       Past Medical History   Past Medical History:   Diagnosis Date    Carotid artery disease (HCC)     Claudication in peripheral vascular disease (HCC)     High cholesterol      Past Surgical History:   Procedure Laterality Date    BYPASS GRAFT  1996    CAROTID STENT  2012    COLONOSCOPY      CORONARY ARTERY BYPASS GRAFT  1995    IR AORTAGRAM WITH RUN-OFF  7/2/2020    NC SLCTV CATHJ 3RD+ ORD SLCTV ABDL PEL/LXTR BRNCH Right 7/2/2020    Procedure: ARTERIOGRAM, RIGHT LEG ANGIOGRAM, BALLOON ANGIOPLASTY AND STENTING OF RIGHT SFA;  Surgeon: Brigitte Platt MD;  Location: [facility];  Service: Vascular    VASCULAR SURGERY       Family History   Problem Relation Age of Onset    Hypertension Family     Heart disease Family     Stroke Family     No Known Problems Mother     No Known Problems Father      Current Outpatient Medications on File Prior to Visit   Medication Sig Dispense Refill    aspirin 81 mg chewable tablet Chew 1 tablet (81 mg total) daily 30 tablet 11    FOLBIC 2.5-25-2 MG Take 1 tablet by mouth daily  4    isosorbide mononitrate (IMDUR) 30 mg 24 hr tablet Take 1 tablet by mouth once daily 30 tablet 0    omega-3-acid ethyl esters (LOVAZA) 1 g capsule Take 2 g by mouth 2 (two) times a day      omeprazole (PriLOSEC) 40 MG capsule Take 40 mg by mouth daily      Vitamin E 500  UNIT/GM POWD Use      Apoaequorin (PREVAGEN PO) Take by mouth (Patient not taking: Reported on 5/17/2024)      atorvastatin (LIPITOR) 40 mg tablet Take 1 tablet (40 mg total) by mouth every evening (Patient not taking: Reported on 5/17/2024) 30 tablet 3    carvedilol (COREG) 6.25 mg tablet Take 1 tablet (6.25 mg total) by mouth every 12 (twelve) hours (Patient not taking: Reported on 5/17/2024) 60 tablet 4    famotidine (PEPCID) 40 MG tablet Take 1 tablet (40 mg total) by mouth daily 90 tablet 1    niacin (NIASPAN) 1000 MG CR tablet daily  (Patient not taking: Reported on 5/17/2024)  3    nitroglycerin (NITROSTAT) 0.4 mg SL tablet Place 1 tablet (0.4 mg total) under the tongue every 5 (five) minutes as needed for chest pain (Patient not taking: Reported on 5/17/2024) 25 tablet 11    pantoprazole (PROTONIX) 20 mg tablet Take 20 mg by mouth daily (Patient not taking: Reported on 5/17/2024)      [DISCONTINUED] cefuroxime (CEFTIN) 250 mg tablet  (Patient not taking: Reported on 5/17/2024)       No current facility-administered medications on file prior to visit.   No Known Allergies   Current Outpatient Medications on File Prior to Visit   Medication Sig Dispense Refill    aspirin 81 mg chewable tablet Chew 1 tablet (81 mg total) daily 30 tablet 11    FOLBIC 2.5-25-2 MG Take 1 tablet by mouth daily  4    isosorbide mononitrate (IMDUR) 30 mg 24 hr tablet Take 1 tablet by mouth once daily 30 tablet 0    omega-3-acid ethyl esters (LOVAZA) 1 g capsule Take 2 g by mouth 2 (two) times a day      omeprazole (PriLOSEC) 40 MG capsule Take 40 mg by mouth daily      Vitamin E 500 UNIT/GM POWD Use      Apoaequorin (PREVAGEN PO) Take by mouth (Patient not taking: Reported on 5/17/2024)      atorvastatin (LIPITOR) 40 mg tablet Take 1 tablet (40 mg total) by mouth every evening (Patient not taking: Reported on 5/17/2024) 30 tablet 3    carvedilol (COREG) 6.25 mg tablet Take 1 tablet (6.25 mg total) by mouth every 12 (twelve) hours  "(Patient not taking: Reported on 2024) 60 tablet 4    famotidine (PEPCID) 40 MG tablet Take 1 tablet (40 mg total) by mouth daily 90 tablet 1    niacin (NIASPAN) 1000 MG CR tablet daily  (Patient not taking: Reported on 2024)  3    nitroglycerin (NITROSTAT) 0.4 mg SL tablet Place 1 tablet (0.4 mg total) under the tongue every 5 (five) minutes as needed for chest pain (Patient not taking: Reported on 2024) 25 tablet 11    pantoprazole (PROTONIX) 20 mg tablet Take 20 mg by mouth daily (Patient not taking: Reported on 2024)      [DISCONTINUED] cefuroxime (CEFTIN) 250 mg tablet  (Patient not taking: Reported on 2024)       No current facility-administered medications on file prior to visit.      Social History     Tobacco Use    Smoking status: Former     Current packs/day: 0.00     Types: Cigarettes     Quit date: 1964     Years since quittin.4    Smokeless tobacco: Never   Vaping Use    Vaping status: Never Used   Substance and Sexual Activity    Alcohol use: Yes     Comment: social     Drug use: Never    Sexual activity: Not Currently     Objective     /78 (BP Location: Left arm, Patient Position: Sitting)   Pulse 72   Resp 18   Ht 5' 2\" (1.575 m)   Wt 67.1 kg (148 lb)   SpO2 92%   BMI 27.07 kg/m²     Physical Exam  Vitals and nursing note reviewed.   Constitutional:       General: He is not in acute distress.     Appearance: Normal appearance. He is well-developed.   HENT:      Head: Normocephalic and atraumatic.   Eyes:      Conjunctiva/sclera: Conjunctivae normal.   Neck:      Vascular: No carotid bruit.   Cardiovascular:      Rate and Rhythm: Normal rate and regular rhythm.      Pulses:           Carotid pulses are 2+ on the right side and 2+ on the left side.       Radial pulses are 2+ on the right side and 2+ on the left side.        Femoral pulses are 2+ on the right side and 2+ on the left side.       Dorsalis pedis pulses are detected w/ Doppler on the right side and " detected w/ Doppler on the left side.        Posterior tibial pulses are detected w/ Doppler on the right side and detected w/ Doppler on the left side.      Heart sounds: Normal heart sounds. No murmur heard.  Pulmonary:      Effort: Pulmonary effort is normal. No respiratory distress.      Breath sounds: Normal breath sounds.   Abdominal:      General: Abdomen is flat. There is no distension.      Palpations: Abdomen is soft.      Tenderness: There is no abdominal tenderness.   Musculoskeletal:         General: No swelling or tenderness.      Cervical back: Normal range of motion and neck supple. No tenderness.      Right lower leg: No edema.      Left lower leg: No edema.   Skin:     General: Skin is warm and dry.      Capillary Refill: Capillary refill takes less than 2 seconds.      Findings: No lesion, rash or wound.   Neurological:      General: No focal deficit present.      Mental Status: He is alert. Mental status is at baseline.      Cranial Nerves: No cranial nerve deficit, dysarthria or facial asymmetry.      Sensory: Sensation is intact.      Motor: Motor function is intact. No weakness.   Psychiatric:         Mood and Affect: Mood normal.         Behavior: Behavior normal.       Administrative Statements   I have spent a total time of 30 minutes on 05/17/24 In caring for this patient including Diagnostic results, Prognosis, Risks and benefits of tx options, Instructions for management, Patient and family education, Importance of tx compliance, Risk factor reductions, Impressions, Counseling / Coordination of care, Documenting in the medical record, Reviewing / ordering tests, medicine, procedures  , and Obtaining or reviewing history  .

## 2024-05-24 ENCOUNTER — APPOINTMENT (OUTPATIENT)
Dept: LAB | Facility: CLINIC | Age: 84
End: 2024-05-24
Payer: MEDICARE

## 2024-05-24 DIAGNOSIS — I73.9 PAD (PERIPHERAL ARTERY DISEASE) (HCC): ICD-10-CM

## 2024-05-24 DIAGNOSIS — I25.10 CAD S/P PERCUTANEOUS CORONARY ANGIOPLASTY: ICD-10-CM

## 2024-05-24 DIAGNOSIS — Z98.61 CAD S/P PERCUTANEOUS CORONARY ANGIOPLASTY: ICD-10-CM

## 2024-05-24 DIAGNOSIS — I25.118 CORONARY ARTERY DISEASE OF NATIVE ARTERY OF NATIVE HEART WITH STABLE ANGINA PECTORIS (HCC): ICD-10-CM

## 2024-05-24 LAB
ALBUMIN SERPL BCP-MCNC: 4 G/DL (ref 3.5–5)
ALP SERPL-CCNC: 77 U/L (ref 34–104)
ALT SERPL W P-5'-P-CCNC: 13 U/L (ref 7–52)
ANION GAP SERPL CALCULATED.3IONS-SCNC: 9 MMOL/L (ref 4–13)
AST SERPL W P-5'-P-CCNC: 21 U/L (ref 13–39)
BILIRUB SERPL-MCNC: 0.66 MG/DL (ref 0.2–1)
BUN SERPL-MCNC: 14 MG/DL (ref 5–25)
CALCIUM SERPL-MCNC: 9 MG/DL (ref 8.4–10.2)
CHLORIDE SERPL-SCNC: 105 MMOL/L (ref 96–108)
CHOLEST SERPL-MCNC: 231 MG/DL
CO2 SERPL-SCNC: 28 MMOL/L (ref 21–32)
CREAT SERPL-MCNC: 0.76 MG/DL (ref 0.6–1.3)
ERYTHROCYTE [DISTWIDTH] IN BLOOD BY AUTOMATED COUNT: 14.3 % (ref 11.6–15.1)
GFR SERPL CREATININE-BSD FRML MDRD: 84 ML/MIN/1.73SQ M
GLUCOSE P FAST SERPL-MCNC: 88 MG/DL (ref 65–99)
HCT VFR BLD AUTO: 45.3 % (ref 36.5–49.3)
HDLC SERPL-MCNC: 37 MG/DL
HGB BLD-MCNC: 14.2 G/DL (ref 12–17)
LDLC SERPL CALC-MCNC: 170 MG/DL (ref 0–100)
MCH RBC QN AUTO: 28.7 PG (ref 26.8–34.3)
MCHC RBC AUTO-ENTMCNC: 31.3 G/DL (ref 31.4–37.4)
MCV RBC AUTO: 92 FL (ref 82–98)
NONHDLC SERPL-MCNC: 194 MG/DL
PLATELET # BLD AUTO: 209 THOUSANDS/UL (ref 149–390)
PMV BLD AUTO: 10.9 FL (ref 8.9–12.7)
POTASSIUM SERPL-SCNC: 4.2 MMOL/L (ref 3.5–5.3)
PROT SERPL-MCNC: 6.7 G/DL (ref 6.4–8.4)
RBC # BLD AUTO: 4.95 MILLION/UL (ref 3.88–5.62)
SODIUM SERPL-SCNC: 142 MMOL/L (ref 135–147)
TRIGL SERPL-MCNC: 122 MG/DL
WBC # BLD AUTO: 4.81 THOUSAND/UL (ref 4.31–10.16)

## 2024-05-24 PROCEDURE — 80061 LIPID PANEL: CPT

## 2024-05-24 PROCEDURE — 36415 COLL VENOUS BLD VENIPUNCTURE: CPT

## 2024-05-24 PROCEDURE — 85027 COMPLETE CBC AUTOMATED: CPT

## 2024-05-24 PROCEDURE — 80053 COMPREHEN METABOLIC PANEL: CPT

## 2024-05-24 RX ORDER — ATORVASTATIN CALCIUM 40 MG/1
40 TABLET, FILM COATED ORAL EVERY EVENING
Qty: 90 TABLET | Refills: 2 | Status: SHIPPED | OUTPATIENT
Start: 2024-05-24 | End: 2025-02-18

## 2024-06-05 ENCOUNTER — HOSPITAL ENCOUNTER (OUTPATIENT)
Dept: RADIOLOGY | Facility: HOSPITAL | Age: 84
Discharge: HOME | End: 2024-06-05
Payer: MEDICARE

## 2024-06-05 DIAGNOSIS — I65.23 ASYMPTOMATIC BILATERAL CAROTID ARTERY STENOSIS: ICD-10-CM

## 2024-06-05 PROCEDURE — 93880 EXTRACRANIAL BILAT STUDY: CPT

## 2024-06-05 PROCEDURE — 93880 EXTRACRANIAL BILAT STUDY: CPT | Performed by: SURGERY

## 2024-06-06 ENCOUNTER — TRANSCRIBE ORDERS (OUTPATIENT)
Dept: VASCULAR SURGERY | Facility: CLINIC | Age: 84
End: 2024-06-06

## 2024-06-06 DIAGNOSIS — I25.118 CORONARY ARTERY DISEASE OF NATIVE ARTERY OF NATIVE HEART WITH STABLE ANGINA PECTORIS (HCC): Primary | ICD-10-CM

## 2024-07-09 ENCOUNTER — TELEPHONE (OUTPATIENT)
Dept: VASCULAR SURGERY | Facility: CLINIC | Age: 84
End: 2024-07-09

## 2024-07-10 NOTE — TELEPHONE ENCOUNTER
----- Message from JACOBO Sandoval sent at 6/6/2024  8:39 AM EDT -----  Reviewed.  No significant change.  Repeat in 2 years.  CV DUPLEX

## 2024-10-27 ENCOUNTER — HOSPITAL ENCOUNTER (EMERGENCY)
Facility: HOSPITAL | Age: 84
Discharge: HOME | End: 2024-10-27
Attending: EMERGENCY MEDICINE
Payer: MEDICARE

## 2024-10-27 VITALS
RESPIRATION RATE: 19 BRPM | SYSTOLIC BLOOD PRESSURE: 146 MMHG | TEMPERATURE: 97.1 F | HEART RATE: 56 BPM | HEIGHT: 62 IN | WEIGHT: 152.56 LBS | DIASTOLIC BLOOD PRESSURE: 70 MMHG | BODY MASS INDEX: 28.07 KG/M2 | OXYGEN SATURATION: 98 %

## 2024-10-27 DIAGNOSIS — M79.661 RIGHT CALF PAIN: Primary | ICD-10-CM

## 2024-10-27 LAB
ALBUMIN SERPL BCG-MCNC: 3.7 G/DL (ref 3.5–5)
ALP SERPL-CCNC: 75 U/L (ref 34–104)
ALT SERPL W P-5'-P-CCNC: 25 U/L (ref 7–52)
ANION GAP SERPL CALCULATED.3IONS-SCNC: 5 MMOL/L (ref 4–13)
APTT PPP: 26 SECONDS (ref 23–34)
AST SERPL W P-5'-P-CCNC: 36 U/L (ref 13–39)
BASOPHILS # BLD AUTO: 0.01 THOUSANDS/ΜL (ref 0–0.1)
BASOPHILS NFR BLD AUTO: 0 % (ref 0–1)
BILIRUB SERPL-MCNC: 0.38 MG/DL (ref 0.2–1)
BUN SERPL-MCNC: 19 MG/DL (ref 5–25)
CALCIUM SERPL-MCNC: 8.9 MG/DL (ref 8.4–10.2)
CHLORIDE SERPL-SCNC: 105 MMOL/L (ref 96–108)
CO2 SERPL-SCNC: 30 MMOL/L (ref 21–32)
CREAT SERPL-MCNC: 0.73 MG/DL (ref 0.6–1.3)
D DIMER PPP FEU-MCNC: 0.61 UG/ML FEU
EOSINOPHIL # BLD AUTO: 0.22 THOUSAND/ΜL (ref 0–0.61)
EOSINOPHIL NFR BLD AUTO: 5 % (ref 0–6)
ERYTHROCYTE [DISTWIDTH] IN BLOOD BY AUTOMATED COUNT: 13.9 % (ref 11.6–15.1)
GFR SERPL CREATININE-BSD FRML MDRD: 85 ML/MIN/1.73SQ M
GLUCOSE SERPL-MCNC: 80 MG/DL (ref 65–140)
HCT VFR BLD AUTO: 38 % (ref 36.5–49.3)
HGB BLD-MCNC: 12.2 G/DL (ref 12–17)
IMM GRANULOCYTES # BLD AUTO: 0 THOUSAND/UL (ref 0–0.2)
IMM GRANULOCYTES NFR BLD AUTO: 0 % (ref 0–2)
INR PPP: 1.06 (ref 0.85–1.19)
LYMPHOCYTES # BLD AUTO: 1.79 THOUSANDS/ΜL (ref 0.6–4.47)
LYMPHOCYTES NFR BLD AUTO: 38 % (ref 14–44)
MCH RBC QN AUTO: 29 PG (ref 26.8–34.3)
MCHC RBC AUTO-ENTMCNC: 32.1 G/DL (ref 31.4–37.4)
MCV RBC AUTO: 90 FL (ref 82–98)
MONOCYTES # BLD AUTO: 0.52 THOUSAND/ΜL (ref 0.17–1.22)
MONOCYTES NFR BLD AUTO: 11 % (ref 4–12)
NEUTROPHILS # BLD AUTO: 2.15 THOUSANDS/ΜL (ref 1.85–7.62)
NEUTS SEG NFR BLD AUTO: 46 % (ref 43–75)
NRBC BLD AUTO-RTO: 0 /100 WBCS
PLATELET # BLD AUTO: 183 THOUSANDS/UL (ref 149–390)
PMV BLD AUTO: 9.9 FL (ref 8.9–12.7)
POTASSIUM SERPL-SCNC: 4 MMOL/L (ref 3.5–5.3)
PROT SERPL-MCNC: 6.4 G/DL (ref 6.4–8.4)
PROTHROMBIN TIME: 14.2 SECONDS (ref 12.3–15)
RBC # BLD AUTO: 4.21 MILLION/UL (ref 3.88–5.62)
SODIUM SERPL-SCNC: 140 MMOL/L (ref 135–147)
WBC # BLD AUTO: 4.69 THOUSAND/UL (ref 4.31–10.16)

## 2024-10-27 PROCEDURE — 99284 EMERGENCY DEPT VISIT MOD MDM: CPT

## 2024-10-27 PROCEDURE — 36415 COLL VENOUS BLD VENIPUNCTURE: CPT | Performed by: EMERGENCY MEDICINE

## 2024-10-27 PROCEDURE — 80053 COMPREHEN METABOLIC PANEL: CPT | Performed by: EMERGENCY MEDICINE

## 2024-10-27 PROCEDURE — 85025 COMPLETE CBC W/AUTO DIFF WBC: CPT | Performed by: EMERGENCY MEDICINE

## 2024-10-27 PROCEDURE — 99284 EMERGENCY DEPT VISIT MOD MDM: CPT | Performed by: EMERGENCY MEDICINE

## 2024-10-27 PROCEDURE — 85379 FIBRIN DEGRADATION QUANT: CPT | Performed by: EMERGENCY MEDICINE

## 2024-10-27 PROCEDURE — 93005 ELECTROCARDIOGRAM TRACING: CPT

## 2024-10-27 PROCEDURE — 85610 PROTHROMBIN TIME: CPT | Performed by: EMERGENCY MEDICINE

## 2024-10-27 PROCEDURE — 85730 THROMBOPLASTIN TIME PARTIAL: CPT | Performed by: EMERGENCY MEDICINE

## 2024-10-28 ENCOUNTER — HOSPITAL ENCOUNTER (OUTPATIENT)
Dept: NON INVASIVE DIAGNOSTICS | Facility: CLINIC | Age: 84
Discharge: HOME | End: 2024-10-28
Payer: MEDICARE

## 2024-10-28 DIAGNOSIS — M79.661 RIGHT CALF PAIN: ICD-10-CM

## 2024-10-28 PROCEDURE — 93971 EXTREMITY STUDY: CPT

## 2024-10-28 PROCEDURE — 93971 EXTREMITY STUDY: CPT | Performed by: SURGERY

## 2024-10-28 NOTE — ED PROVIDER NOTES
Time reflects when diagnosis was documented in both MDM as applicable and the Disposition within this note       Time User Action Codes Description Comment    10/27/2024  9:01 PM Bea Murphy Add [M79.661] Right calf pain           ED Disposition       ED Disposition   Discharge    Condition   --    Date/Time   Sun Oct 27, 2024  9:24 PM    Comment   Carlos Randolph discharge to home/self care.                   Assessment & Plan       Medical Decision Making  84-year-old male presented to the emergency department for evaluation of an episode of right calf pain.  On arrival patient awake, alert, asymptomatic and in no acute distress.  Physical exam was benign.  The patient arrived to the emergency department with a caretaker.  I spoke with the patient's daughter on the phone who expressed concern of a DVT because of the calf pain and recent overseas travel.  I explained to the patient and the patient's daughter that duplex ultrasound would not be able to be performed here in the emergency department [facility] has no vascular tech on-call at this time.  Recommendation was made for blood work including D-dimer.  The patient and patient's daughter in agreement with plan.  Blood work grossly unremarkable including an age-adjusted D-dimer within normal limits.  All diagnostic studies were discussed with the patient and the patient's daughter over the phone.  The patient was provided with a prescription for a duplex ultrasound with recommendation to follow-up tomorrow.  No indication for anticoagulation at this time.    Amount and/or Complexity of Data Reviewed  Labs: ordered. Decision-making details documented in ED Course.        ED Course as of 10/28/24 0434   Sun Oct 27, 2024   2057 D-Dimer, Quant(!): 0.61  Negative age-adjusted D-dimer       Medications - No data to display    ED Risk Strat Scores                                 Wells' Criteria for DVT      Flowsheet Row Most Recent Value   Wells' Criteria for DVT     Active cancer Treatment or palliation within 6 months 0 Filed at: 10/27/2024 2030   Bedridden recently >3 days or major surgery within 12 weeks 0 Filed at: 10/27/2024 2030   Calf swelling >3 cm compared to the other leg 0 Filed at: 10/27/2024 2030   Entire leg swollen 0 Filed at: 10/27/2024 2030   Collateral (nonvaricose) superficial veins present 0 Filed at: 10/27/2024 2030   Localized tenderness along the deep venous system 0 Filed at: 10/27/2024 2030   Pitting edema, confined to symptomatic leg 0 Filed at: 10/27/2024 2030   Paralysis, paresis, or recent plaster immobilization of the lower extremity 0 Filed at: 10/27/2024 2030   Previously documented DVT 0 Filed at: 10/27/2024 2030   Alternative diagnosis to DVT as likely or more likely 0 Filed at: 10/27/2024 2030   Wells DVT Critera Score 0 Filed at: 10/27/2024 2030                      History of Present Illness       Chief Complaint   Patient presents with    Leg Pain     c/o pain RLE earlier this evening, since resolved, concerned for DVT d/t recent internation travel       Past Medical History:   Diagnosis Date    Carotid artery disease (HCC)     Claudication in peripheral vascular disease (HCC)     High cholesterol       Past Surgical History:   Procedure Laterality Date    BYPASS GRAFT  1996    CAROTID STENT  2012    COLONOSCOPY      CORONARY ARTERY BYPASS GRAFT  1995    IR AORTAGRAM WITH RUN-OFF  7/2/2020    [facility] CATHJ 3RD+ ORD SLCTV ABDL PEL/LXTR BRNCH Right 7/2/2020    Procedure: ARTERIOGRAM, RIGHT LEG ANGIOGRAM, BALLOON ANGIOPLASTY AND STENTING OF RIGHT SFA;  Surgeon: Brigitte Platt MD;  Location: [facility];  Service: Vascular    VASCULAR SURGERY        Family History   Problem Relation Age of Onset    Hypertension Family     Heart disease Family     Stroke Family     No Known Problems Mother     No Known Problems Father       Social History     Tobacco Use    Smoking status: Former     Current packs/day: 0.00     Types: Cigarettes     Quit date:  1964     Years since quittin.8    Smokeless tobacco: Never   Vaping Use    Vaping status: Never Used   Substance Use Topics    Alcohol use: Yes     Comment: social     Drug use: Never      E-Cigarette/Vaping    E-Cigarette Use Never User       E-Cigarette/Vaping Substances    Nicotine No     THC No     CBD No     Flavoring No     Other No     Unknown No       I have reviewed and agree with the history as documented.     84-year-old male presents to the emergency department for evaluation of right leg pain.  The patient reports approximately 2 hours prior to arrival having an episode of right calf pain.  The patient states that the pain has resolved but is concerned for a DVT because he returned from a trip to [place] 2 days ago.  He denies prior history of DVT or PE.  He reports that he takes a daily aspirin.  Does not take any other antiplatelet or anticoagulation medications.  No chest pain, shortness of breath.  No fevers, chills, nausea, vomiting or diarrhea.        Review of Systems   Constitutional:  Negative for chills and fever.   HENT:  Negative for ear pain and sore throat.    Eyes:  Negative for pain and visual disturbance.   Respiratory:  Negative for cough and shortness of breath.    Cardiovascular:  Negative for chest pain and palpitations.   Gastrointestinal:  Negative for abdominal pain and vomiting.   Genitourinary:  Negative for dysuria and hematuria.   Musculoskeletal:  Negative for arthralgias and back pain.   Skin:  Negative for color change and rash.   Neurological:  Negative for seizures and syncope.   All other systems reviewed and are negative.          Objective       ED Triage Vitals   Temperature Pulse Blood Pressure Respirations SpO2 Patient Position - Orthostatic VS   10/27/24 1950 10/27/24 1947 10/27/24 1947 10/27/24 1947 10/27/24 1947 10/27/24 1947   (!) 97.1 °F (36.2 °C) 67 151/70 18 96 % Lying      Temp Source Heart Rate Source BP Location FiO2 (%) Pain Score    10/27/24 1947  10/27/24 1947 10/27/24 1947 -- 10/27/24 1947    Oral Monitor Left arm  No Pain      Vitals      Date and Time Temp Pulse SpO2 Resp BP Pain Score FACES Pain Rating User   10/27/24 2100 -- 56 98 % 19 146/70 -- -- ANDREW   10/27/24 2030 -- 60 96 % 17 150/72 -- -- ANDREW   10/27/24 1950 97.1 °F (36.2 °C) -- -- -- -- -- -- AM   10/27/24 1947 -- 67 96 % 18 151/70 No Pain -- EJN            Physical Exam  Vitals and nursing note reviewed.   Constitutional:       General: He is not in acute distress.     Appearance: He is well-developed.   HENT:      Head: Normocephalic and atraumatic.   Eyes:      Conjunctiva/sclera: Conjunctivae normal.   Cardiovascular:      Rate and Rhythm: Normal rate and regular rhythm.      Pulses:           Dorsalis pedis pulses are 2+ on the right side and 2+ on the left side.   Pulmonary:      Effort: Pulmonary effort is normal. No respiratory distress.      Breath sounds: Normal breath sounds.   Abdominal:      Palpations: Abdomen is soft.      Tenderness: There is no abdominal tenderness.   Musculoskeletal:         General: No swelling.      Cervical back: Neck supple.      Right lower leg: No tenderness. No edema.      Left lower leg: No tenderness. No edema.      Comments: Distal lower extremity pulse, motor and sensation intact and symmetric bilaterally.   Skin:     General: Skin is warm and dry.      Capillary Refill: Capillary refill takes less than 2 seconds.   Neurological:      Mental Status: He is alert.   Psychiatric:         Mood and Affect: Mood normal.         Results Reviewed       Procedure Component Value Units Date/Time    Comprehensive metabolic panel [[id number]] Collected: 10/27/24 2022    Lab Status: Final result Specimen: Blood from Arm, Left Updated: 10/27/24 2054     Sodium 140 mmol/L      Potassium 4.0 mmol/L      Chloride 105 mmol/L      CO2 30 mmol/L      ANION GAP 5 mmol/L      BUN 19 mg/dL      Creatinine 0.73 mg/dL      Glucose 80 mg/dL      Calcium 8.9 mg/dL      AST 36 U/L       ALT 25 U/L      Alkaline Phosphatase 75 U/L      Total Protein 6.4 g/dL      Albumin 3.7 g/dL      Total Bilirubin 0.38 mg/dL      eGFR 85 ml/min/1.73sq m     Narrative:      National Kidney Disease Foundation guidelines for Chronic Kidney Disease (CKD):     Stage 1 with normal or high GFR (GFR > 90 mL/min/1.73 square meters)    Stage 2 Mild CKD (GFR = 60-89 mL/min/1.73 square meters)    Stage 3A Moderate CKD (GFR = 45-59 mL/min/1.73 square meters)    Stage 3B Moderate CKD (GFR = 30-44 mL/min/1.73 square meters)    Stage 4 Severe CKD (GFR = 15-29 mL/min/1.73 square meters)    Stage 5 End Stage CKD (GFR <15 mL/min/1.73 square meters)  Note: GFR calculation is accurate only with a steady state creatinine    D-Dimer [[id number]]  (Abnormal) Collected: 10/27/24 2022    Lab Status: Final result Specimen: Blood from Arm, Left Updated: 10/27/24 2053     D-Dimer, Quant 0.61 ug/ml FEU     Narrative:      In the evaluation for possible pulmonary embolism, in the appropriate (Well's Score of 4 or less) patient, the age adjusted d-dimer cutoff for this patient can be calculated as:    Age x 0.01 (in ug/mL) for Age-adjusted D-dimer exclusion threshold for a patient over 50 years.    Protime-INR [[id number]]  (Normal) Collected: 10/27/24 2022    Lab Status: Final result Specimen: Blood from Arm, Left Updated: 10/27/24 2038     Protime 14.2 seconds      INR 1.06    Narrative:      INR Therapeutic Range    Indication                                             INR Range      Atrial Fibrillation                                               2.0-3.0  Hypercoagulable State                                    2.0.2.3  Left Ventricular Asist Device                            2.0-3.0  Mechanical Heart Valve                                  -    Aortic(with afib, MI, embolism, HF, LA enlargement,    and/or coagulopathy)                                     2.0-3.0 (2.5-3.5)     Mitral                                                              2.5-3.5  Prosthetic/Bioprosthetic Heart Valve               2.0-3.0  Venous thromboembolism (VTE: VT, PE        2.0-3.0    APTT [[id number]]  (Normal) Collected: 10/27/24 2022    Lab Status: Final result Specimen: Blood from Arm, Left Updated: 10/27/24 2038     PTT 26 seconds     CBC and differential [[id number]] Collected: 10/27/24 2022    Lab Status: Final result Specimen: Blood from Arm, Left Updated: 10/27/24 2030     WBC 4.69 Thousand/uL      RBC 4.21 Million/uL      Hemoglobin 12.2 g/dL      Hematocrit 38.0 %      MCV 90 fL      MCH 29.0 pg      MCHC 32.1 g/dL      RDW 13.9 %      MPV 9.9 fL      Platelets 183 Thousands/uL      nRBC 0 /100 WBCs      Segmented % 46 %      Immature Grans % 0 %      Lymphocytes % 38 %      Monocytes % 11 %      Eosinophils Relative 5 %      Basophils Relative 0 %      Absolute Neutrophils 2.15 Thousands/µL      Absolute Immature Grans 0.00 Thousand/uL      Absolute Lymphocytes 1.79 Thousands/µL      Absolute Monocytes 0.52 Thousand/µL      Eosinophils Absolute 0.22 Thousand/µL      Basophils Absolute 0.01 Thousands/µL             VAS VENOUS DUPLEX -LOWER LIMB UNILATERAL    (Results Pending)       ECG 12 Lead Documentation Only    Date/Time: 10/27/2024 8:18 PM    Performed by: Murphy Figueredo MD  Authorized by: Murphy Figueredo MD    ECG reviewed by me, the ED Provider: yes    Patient location:  ED  Previous ECG:     Previous ECG:  Compared to current    Similarity:  Changes noted    Comparison to cardiac monitor: Yes    Interpretation:     Interpretation: abnormal    Rate:     ECG rate assessment: normal    Rhythm:     Rhythm: sinus rhythm    Ectopy:     Ectopy: none    QRS:     QRS axis:  Left  Conduction:     Conduction: abnormal      Abnormal conduction: non-specific intraventricular conduction delay    ST segments:     ST segments:  Normal  T waves:     T waves: normal        ED Medication and Procedure Management   Prior to Admission Medications   Prescriptions  Last Dose Informant Patient Reported? Taking?   Apoaequorin (PREVAGEN PO)  Self Yes No   Sig: Take by mouth   Patient not taking: Reported on 5/17/2024   FOLBIC 2.5-25-2 MG  Self Yes No   Sig: Take 1 tablet by mouth daily   Vitamin E 500 UNIT/GM POWD   Yes No   Sig: Use   aspirin 81 mg chewable tablet  Self No No   Sig: Chew 1 tablet (81 mg total) daily   atorvastatin (LIPITOR) 40 mg tablet   No No   Sig: Take 1 tablet (40 mg total) by mouth every evening   carvedilol (COREG) 6.25 mg tablet  Self No No   Sig: Take 1 tablet (6.25 mg total) by mouth every 12 (twelve) hours   Patient not taking: Reported on 5/17/2024   famotidine (PEPCID) 40 MG tablet  Self No No   Sig: Take 1 tablet (40 mg total) by mouth daily   isosorbide mononitrate (IMDUR) 30 mg 24 hr tablet  Self No No   Sig: Take 1 tablet by mouth once daily   niacin (NIASPAN) 1000 MG CR tablet  Self Yes No   Sig: daily    Patient not taking: Reported on 5/17/2024   nitroglycerin (NITROSTAT) 0.4 mg SL tablet  Self No No   Sig: Place 1 tablet (0.4 mg total) under the tongue every 5 (five) minutes as needed for chest pain   Patient not taking: Reported on 5/17/2024   omega-3-acid ethyl esters (LOVAZA) 1 g capsule  Self Yes No   Sig: Take 2 g by mouth 2 (two) times a day   omeprazole (PriLOSEC) 40 MG capsule   Yes No   Sig: Take 40 mg by mouth daily   pantoprazole (PROTONIX) 20 mg tablet  Self Yes No   Sig: Take 20 mg by mouth daily   Patient not taking: Reported on 5/17/2024      Facility-Administered Medications: None     Discharge Medication List as of 10/27/2024  9:05 PM        CONTINUE these medications which have NOT CHANGED    Details   Apoaequorin (PREVAGEN PO) Take by mouth, Historical Med      aspirin 81 mg chewable tablet Chew 1 tablet (81 mg total) daily, Starting Sat 6/19/2021, Print      atorvastatin (LIPITOR) 40 mg tablet Take 1 tablet (40 mg total) by mouth every evening, Starting Fri 5/24/2024, Until Tue 2/18/2025, Normal      carvedilol (COREG)  6.25 mg tablet Take 1 tablet (6.25 mg total) by mouth every 12 (twelve) hours, Starting Fri 6/18/2021, Normal      famotidine (PEPCID) 40 MG tablet Take 1 tablet (40 mg total) by mouth daily, Starting Tue 4/26/2022, Until Mon 11/14/2022, Normal      FOLBIC 2.5-25-2 MG Take 1 tablet by mouth daily, Starting Wed 6/26/2019, Historical Med      isosorbide mononitrate (IMDUR) 30 mg 24 hr tablet Take 1 tablet by mouth once daily, Normal      niacin (NIASPAN) 1000 MG CR tablet daily , Starting Fri 5/3/2019, Historical Med      nitroglycerin (NITROSTAT) 0.4 mg SL tablet Place 1 tablet (0.4 mg total) under the tongue every 5 (five) minutes as needed for chest pain, Starting Fri 6/4/2021, Normal      omega-3-acid ethyl esters (LOVAZA) 1 g capsule Take 2 g by mouth 2 (two) times a day, Historical Med      omeprazole (PriLOSEC) 40 MG capsule Take 40 mg by mouth daily, Historical Med      pantoprazole (PROTONIX) 20 mg tablet Take 20 mg by mouth daily, Starting Wed 5/25/2022, Historical Med      Vitamin E 500 UNIT/GM POWD Use, Historical Med           Outpatient Discharge Orders   VAS VENOUS DUPLEX -LOWER LIMB UNILATERAL   Standing Status: Future Standing Exp. Date: 10/27/28     ED SEPSIS DOCUMENTATION   Time reflects when diagnosis was documented in both MDM as applicable and the Disposition within this note       Time User Action Codes Description Comment    10/27/2024  9:01 PM Murphy Figueredo Add [M79.661] Right calf pain                  Murphy Figueredo MD  10/28/24 0434

## 2024-10-29 LAB
ATRIAL RATE: 60 BPM
P AXIS: 62 DEGREES
PR INTERVAL: 158 MS
QRS AXIS: 2 DEGREES
QRSD INTERVAL: 134 MS
QT INTERVAL: 476 MS
QTC INTERVAL: 476 MS
T WAVE AXIS: 73 DEGREES
VENTRICULAR RATE: 60 BPM

## 2024-10-29 PROCEDURE — 93010 ELECTROCARDIOGRAM REPORT: CPT | Performed by: INTERNAL MEDICINE

## 2024-10-31 ENCOUNTER — TELEPHONE (OUTPATIENT)
Age: 84
End: 2024-10-31

## 2024-10-31 NOTE — TELEPHONE ENCOUNTER
I called the patient's daughter, Abena about the patient. She wants a re-evaluation of her father for a new baseline. Patient hasn't been seen in two years and the provider isn't in the [place] office anymore. So the patient needs to be scheduled for a new patient appointment.

## 2024-10-31 NOTE — TELEPHONE ENCOUNTER
Patient's daughter called looking for basic reassessment. She states her father has 24/7 care now and they would like to know his new baselines. She is requesting a call back with further recommendations on how to move forward.

## 2024-11-08 ENCOUNTER — APPOINTMENT (EMERGENCY)
Dept: CT IMAGING | Facility: HOSPITAL | Age: 84
DRG: 871 | End: 2024-11-08
Payer: MEDICARE

## 2024-11-08 ENCOUNTER — HOSPITAL ENCOUNTER (INPATIENT)
Facility: HOSPITAL | Age: 84
LOS: 3 days | Discharge: HOME | DRG: 871 | End: 2024-11-12
Attending: EMERGENCY MEDICINE | Admitting: INTERNAL MEDICINE
Payer: MEDICARE

## 2024-11-08 ENCOUNTER — OFFICE VISIT (OUTPATIENT)
Dept: URGENT CARE | Facility: CLINIC | Age: 84
End: 2024-11-08
Payer: MEDICARE

## 2024-11-08 ENCOUNTER — APPOINTMENT (EMERGENCY)
Dept: RADIOLOGY | Facility: HOSPITAL | Age: 84
DRG: 871 | End: 2024-11-08
Payer: MEDICARE

## 2024-11-08 VITALS
RESPIRATION RATE: 16 BRPM | SYSTOLIC BLOOD PRESSURE: 128 MMHG | OXYGEN SATURATION: 94 % | TEMPERATURE: 102.7 F | DIASTOLIC BLOOD PRESSURE: 60 MMHG | HEART RATE: 107 BPM

## 2024-11-08 DIAGNOSIS — K59.09 OTHER CONSTIPATION: ICD-10-CM

## 2024-11-08 DIAGNOSIS — R41.82 ALTERED MENTAL STATUS, UNSPECIFIED: Primary | ICD-10-CM

## 2024-11-08 DIAGNOSIS — R50.9 FEVER, UNSPECIFIED FEVER CAUSE: Primary | ICD-10-CM

## 2024-11-08 DIAGNOSIS — I82.491 ACUTE DEEP VEIN THROMBOSIS (DVT) OF OTHER SPECIFIED VEIN OF RIGHT LOWER EXTREMITY (HCC): ICD-10-CM

## 2024-11-08 PROBLEM — J06.9 URI (UPPER RESPIRATORY INFECTION): Status: ACTIVE | Noted: 2024-11-08

## 2024-11-08 PROBLEM — A41.9 SEPSIS (HCC): Status: ACTIVE | Noted: 2024-11-08

## 2024-11-08 LAB
2HR DELTA HS TROPONIN: 0 NG/L
ALBUMIN SERPL BCG-MCNC: 4.3 G/DL (ref 3.5–5)
ALP SERPL-CCNC: 85 U/L (ref 34–104)
ALT SERPL W P-5'-P-CCNC: 30 U/L (ref 7–52)
ANION GAP SERPL CALCULATED.3IONS-SCNC: 9 MMOL/L (ref 4–13)
APTT PPP: 38 SECONDS (ref 23–34)
AST SERPL W P-5'-P-CCNC: 42 U/L (ref 13–39)
ATRIAL RATE: 98 BPM
BASOPHILS # BLD AUTO: 0.01 THOUSANDS/ÂΜL (ref 0–0.1)
BASOPHILS NFR BLD AUTO: 0 % (ref 0–1)
BILIRUB SERPL-MCNC: 0.7 MG/DL (ref 0.2–1)
BILIRUB UR QL STRIP: NEGATIVE
BNP SERPL-MCNC: 89 PG/ML (ref 0–100)
BUN SERPL-MCNC: 20 MG/DL (ref 5–25)
CALCIUM SERPL-MCNC: 9.3 MG/DL (ref 8.4–10.2)
CARDIAC TROPONIN I PNL SERPL HS: 9 NG/L
CARDIAC TROPONIN I PNL SERPL HS: 9 NG/L
CHLORIDE SERPL-SCNC: 102 MMOL/L (ref 96–108)
CLARITY UR: CLEAR
CO2 SERPL-SCNC: 25 MMOL/L (ref 21–32)
COLOR UR: YELLOW
CREAT SERPL-MCNC: 0.95 MG/DL (ref 0.6–1.3)
EOSINOPHIL # BLD AUTO: 0.01 THOUSAND/ÂΜL (ref 0–0.61)
EOSINOPHIL NFR BLD AUTO: 0 % (ref 0–6)
ERYTHROCYTE [DISTWIDTH] IN BLOOD BY AUTOMATED COUNT: 14.4 % (ref 11.6–15.1)
FLUAV RNA RESP QL NAA+PROBE: NEGATIVE
FLUBV RNA RESP QL NAA+PROBE: NEGATIVE
GFR SERPL CREATININE-BSD FRML MDRD: 73 ML/MIN/1.73SQ M
GLUCOSE SERPL-MCNC: 96 MG/DL (ref 65–140)
GLUCOSE UR STRIP-MCNC: NEGATIVE MG/DL
HCT VFR BLD AUTO: 45 % (ref 36.5–49.3)
HGB BLD-MCNC: 14.5 G/DL (ref 12–17)
HGB UR QL STRIP.AUTO: NEGATIVE
IMM GRANULOCYTES # BLD AUTO: 0.02 THOUSAND/UL (ref 0–0.2)
IMM GRANULOCYTES NFR BLD AUTO: 0 % (ref 0–2)
INR PPP: 1.35 (ref 0.85–1.19)
KETONES UR STRIP-MCNC: NEGATIVE MG/DL
LACTATE SERPL-SCNC: 1.2 MMOL/L (ref 0.5–2)
LACTATE SERPL-SCNC: 3 MMOL/L (ref 0.5–2)
LEUKOCYTE ESTERASE UR QL STRIP: NEGATIVE
LYMPHOCYTES # BLD AUTO: 1.6 THOUSANDS/ÂΜL (ref 0.6–4.47)
LYMPHOCYTES NFR BLD AUTO: 18 % (ref 14–44)
MAGNESIUM SERPL-MCNC: 1.7 MG/DL (ref 1.9–2.7)
MCH RBC QN AUTO: 29.6 PG (ref 26.8–34.3)
MCHC RBC AUTO-ENTMCNC: 32.2 G/DL (ref 31.4–37.4)
MCV RBC AUTO: 92 FL (ref 82–98)
MONOCYTES # BLD AUTO: 1.19 THOUSAND/ÂΜL (ref 0.17–1.22)
MONOCYTES NFR BLD AUTO: 14 % (ref 4–12)
NEUTROPHILS # BLD AUTO: 5.94 THOUSANDS/ÂΜL (ref 1.85–7.62)
NEUTS SEG NFR BLD AUTO: 68 % (ref 43–75)
NITRITE UR QL STRIP: NEGATIVE
NRBC BLD AUTO-RTO: 0 /100 WBCS
P AXIS: 61 DEGREES
PH UR STRIP.AUTO: 6 [PH]
PHOSPHATE SERPL-MCNC: 3 MG/DL (ref 2.3–4.1)
PLATELET # BLD AUTO: 177 THOUSANDS/UL (ref 149–390)
PMV BLD AUTO: 10.7 FL (ref 8.9–12.7)
POTASSIUM SERPL-SCNC: 4.7 MMOL/L (ref 3.5–5.3)
PR INTERVAL: 160 MS
PROCALCITONIN SERPL-MCNC: 0.09 NG/ML
PROT SERPL-MCNC: 7.8 G/DL (ref 6.4–8.4)
PROT UR STRIP-MCNC: NEGATIVE MG/DL
PROTHROMBIN TIME: 17.1 SECONDS (ref 12.3–15)
QRS AXIS: -44 DEGREES
QRSD INTERVAL: 128 MS
QT INTERVAL: 372 MS
QTC INTERVAL: 474 MS
RBC # BLD AUTO: 4.9 MILLION/UL (ref 3.88–5.62)
RSV RNA RESP QL NAA+PROBE: NEGATIVE
S PYO DNA THROAT QL NAA+PROBE: NOT DETECTED
SARS-COV-2 RNA RESP QL NAA+PROBE: NEGATIVE
SODIUM SERPL-SCNC: 136 MMOL/L (ref 135–147)
SP GR UR STRIP.AUTO: <=1.005 (ref 1–1.03)
T WAVE AXIS: 77 DEGREES
UROBILINOGEN UR QL STRIP.AUTO: 0.2 E.U./DL
VENTRICULAR RATE: 98 BPM
WBC # BLD AUTO: 8.77 THOUSAND/UL (ref 4.31–10.16)

## 2024-11-08 PROCEDURE — 84100 ASSAY OF PHOSPHORUS: CPT

## 2024-11-08 PROCEDURE — 74177 CT ABD & PELVIS W/CONTRAST: CPT

## 2024-11-08 PROCEDURE — 81003 URINALYSIS AUTO W/O SCOPE: CPT | Performed by: PHYSICIAN ASSISTANT

## 2024-11-08 PROCEDURE — 96365 THER/PROPH/DIAG IV INF INIT: CPT

## 2024-11-08 PROCEDURE — 84484 ASSAY OF TROPONIN QUANT: CPT | Performed by: PHYSICIAN ASSISTANT

## 2024-11-08 PROCEDURE — 93005 ELECTROCARDIOGRAM TRACING: CPT

## 2024-11-08 PROCEDURE — 85025 COMPLETE CBC W/AUTO DIFF WBC: CPT | Performed by: PHYSICIAN ASSISTANT

## 2024-11-08 PROCEDURE — 99285 EMERGENCY DEPT VISIT HI MDM: CPT | Performed by: PHYSICIAN ASSISTANT

## 2024-11-08 PROCEDURE — 36415 COLL VENOUS BLD VENIPUNCTURE: CPT | Performed by: PHYSICIAN ASSISTANT

## 2024-11-08 PROCEDURE — 71045 X-RAY EXAM CHEST 1 VIEW: CPT

## 2024-11-08 PROCEDURE — 0202U NFCT DS 22 TRGT SARS-COV-2: CPT

## 2024-11-08 PROCEDURE — 85610 PROTHROMBIN TIME: CPT | Performed by: PHYSICIAN ASSISTANT

## 2024-11-08 PROCEDURE — 83605 ASSAY OF LACTIC ACID: CPT | Performed by: PHYSICIAN ASSISTANT

## 2024-11-08 PROCEDURE — 83880 ASSAY OF NATRIURETIC PEPTIDE: CPT | Performed by: PHYSICIAN ASSISTANT

## 2024-11-08 PROCEDURE — 71275 CT ANGIOGRAPHY CHEST: CPT

## 2024-11-08 PROCEDURE — 87154 CUL TYP ID BLD PTHGN 6+ TRGT: CPT | Performed by: PHYSICIAN ASSISTANT

## 2024-11-08 PROCEDURE — 70450 CT HEAD/BRAIN W/O DYE: CPT

## 2024-11-08 PROCEDURE — 94640 AIRWAY INHALATION TREATMENT: CPT

## 2024-11-08 PROCEDURE — 84145 PROCALCITONIN (PCT): CPT | Performed by: PHYSICIAN ASSISTANT

## 2024-11-08 PROCEDURE — 83735 ASSAY OF MAGNESIUM: CPT

## 2024-11-08 PROCEDURE — 96361 HYDRATE IV INFUSION ADD-ON: CPT

## 2024-11-08 PROCEDURE — 87040 BLOOD CULTURE FOR BACTERIA: CPT | Performed by: PHYSICIAN ASSISTANT

## 2024-11-08 PROCEDURE — G0463 HOSPITAL OUTPT CLINIC VISIT: HCPCS | Performed by: PHYSICIAN ASSISTANT

## 2024-11-08 PROCEDURE — 94760 N-INVAS EAR/PLS OXIMETRY 1: CPT

## 2024-11-08 PROCEDURE — 99285 EMERGENCY DEPT VISIT HI MDM: CPT

## 2024-11-08 PROCEDURE — 87651 STREP A DNA AMP PROBE: CPT | Performed by: PHYSICIAN ASSISTANT

## 2024-11-08 PROCEDURE — 80053 COMPREHEN METABOLIC PANEL: CPT | Performed by: PHYSICIAN ASSISTANT

## 2024-11-08 PROCEDURE — 94664 DEMO&/EVAL PT USE INHALER: CPT

## 2024-11-08 PROCEDURE — 99213 OFFICE O/P EST LOW 20 MIN: CPT | Performed by: PHYSICIAN ASSISTANT

## 2024-11-08 PROCEDURE — 85730 THROMBOPLASTIN TIME PARTIAL: CPT | Performed by: PHYSICIAN ASSISTANT

## 2024-11-08 PROCEDURE — 0241U HB NFCT DS VIR RESP RNA 4 TRGT: CPT | Performed by: PHYSICIAN ASSISTANT

## 2024-11-08 RX ORDER — CEFTRIAXONE 1 G/50ML
1000 INJECTION, SOLUTION INTRAVENOUS EVERY 24 HOURS
Status: DISCONTINUED | OUTPATIENT
Start: 2024-11-09 | End: 2024-11-08

## 2024-11-08 RX ORDER — ONDANSETRON 2 MG/ML
4 INJECTION INTRAMUSCULAR; INTRAVENOUS EVERY 6 HOURS PRN
Status: DISCONTINUED | OUTPATIENT
Start: 2024-11-08 | End: 2024-11-12 | Stop reason: HOSPADM

## 2024-11-08 RX ORDER — FAMOTIDINE 20 MG/1
40 TABLET, FILM COATED ORAL DAILY
Status: DISCONTINUED | OUTPATIENT
Start: 2024-11-09 | End: 2024-11-12 | Stop reason: HOSPADM

## 2024-11-08 RX ORDER — ACETAMINOPHEN 10 MG/ML
1000 INJECTION, SOLUTION INTRAVENOUS ONCE
Status: COMPLETED | OUTPATIENT
Start: 2024-11-08 | End: 2024-11-08

## 2024-11-08 RX ORDER — MAGNESIUM SULFATE HEPTAHYDRATE 40 MG/ML
2 INJECTION, SOLUTION INTRAVENOUS ONCE
Status: COMPLETED | OUTPATIENT
Start: 2024-11-08 | End: 2024-11-09

## 2024-11-08 RX ORDER — CARVEDILOL 6.25 MG/1
6.25 TABLET ORAL EVERY 12 HOURS SCHEDULED
Status: DISCONTINUED | OUTPATIENT
Start: 2024-11-08 | End: 2024-11-12 | Stop reason: HOSPADM

## 2024-11-08 RX ORDER — ALBUTEROL SULFATE 0.83 MG/ML
2.5 SOLUTION RESPIRATORY (INHALATION) EVERY 6 HOURS PRN
Status: DISCONTINUED | OUTPATIENT
Start: 2024-11-08 | End: 2024-11-12 | Stop reason: HOSPADM

## 2024-11-08 RX ORDER — ISOSORBIDE MONONITRATE 30 MG/1
30 TABLET, EXTENDED RELEASE ORAL DAILY
Status: DISCONTINUED | OUTPATIENT
Start: 2024-11-09 | End: 2024-11-12 | Stop reason: HOSPADM

## 2024-11-08 RX ORDER — IPRATROPIUM BROMIDE AND ALBUTEROL SULFATE 2.5; .5 MG/3ML; MG/3ML
3 SOLUTION RESPIRATORY (INHALATION)
Status: DISCONTINUED | OUTPATIENT
Start: 2024-11-08 | End: 2024-11-08

## 2024-11-08 RX ORDER — HEPARIN SODIUM 5000 [USP'U]/ML
5000 INJECTION, SOLUTION INTRAVENOUS; SUBCUTANEOUS EVERY 8 HOURS SCHEDULED
Status: DISCONTINUED | OUTPATIENT
Start: 2024-11-08 | End: 2024-11-08

## 2024-11-08 RX ORDER — SODIUM CHLORIDE 9 MG/ML
100 INJECTION, SOLUTION INTRAVENOUS CONTINUOUS
Status: DISPENSED | OUTPATIENT
Start: 2024-11-08 | End: 2024-11-09

## 2024-11-08 RX ORDER — POLYETHYLENE GLYCOL 3350 17 G/17G
17 POWDER, FOR SOLUTION ORAL DAILY PRN
Status: DISCONTINUED | OUTPATIENT
Start: 2024-11-08 | End: 2024-11-12 | Stop reason: HOSPADM

## 2024-11-08 RX ORDER — NITROGLYCERIN 0.4 MG/1
0.4 TABLET SUBLINGUAL
Status: DISCONTINUED | OUTPATIENT
Start: 2024-11-08 | End: 2024-11-12 | Stop reason: HOSPADM

## 2024-11-08 RX ORDER — ENOXAPARIN SODIUM 100 MG/ML
40 INJECTION SUBCUTANEOUS
Status: DISCONTINUED | OUTPATIENT
Start: 2024-11-09 | End: 2024-11-09

## 2024-11-08 RX ORDER — ASPIRIN 81 MG/1
81 TABLET, CHEWABLE ORAL DAILY
Status: DISCONTINUED | OUTPATIENT
Start: 2024-11-09 | End: 2024-11-12 | Stop reason: HOSPADM

## 2024-11-08 RX ORDER — ACETAMINOPHEN 325 MG/1
650 TABLET ORAL EVERY 6 HOURS PRN
Status: DISCONTINUED | OUTPATIENT
Start: 2024-11-08 | End: 2024-11-12 | Stop reason: HOSPADM

## 2024-11-08 RX ORDER — CEFTRIAXONE 1 G/50ML
1000 INJECTION, SOLUTION INTRAVENOUS ONCE
Status: COMPLETED | OUTPATIENT
Start: 2024-11-08 | End: 2024-11-08

## 2024-11-08 RX ADMIN — CEFTRIAXONE 1000 MG: 1 INJECTION, SOLUTION INTRAVENOUS at 18:23

## 2024-11-08 RX ADMIN — MAGNESIUM SULFATE HEPTAHYDRATE 2 G: 40 INJECTION, SOLUTION INTRAVENOUS at 22:31

## 2024-11-08 RX ADMIN — SODIUM CHLORIDE 100 ML/HR: 0.9 INJECTION, SOLUTION INTRAVENOUS at 22:30

## 2024-11-08 RX ADMIN — ACETAMINOPHEN 1000 MG: 10 INJECTION INTRAVENOUS at 20:56

## 2024-11-08 RX ADMIN — IOHEXOL 100 ML: 350 INJECTION, SOLUTION INTRAVENOUS at 16:24

## 2024-11-08 RX ADMIN — SODIUM CHLORIDE 1000 ML: 0.9 INJECTION, SOLUTION INTRAVENOUS at 15:30

## 2024-11-08 RX ADMIN — IPRATROPIUM BROMIDE AND ALBUTEROL SULFATE 3 ML: 2.5; .5 SOLUTION RESPIRATORY (INHALATION) at 18:24

## 2024-11-08 RX ADMIN — ACETAMINOPHEN 1000 MG: 10 INJECTION INTRAVENOUS at 14:40

## 2024-11-08 NOTE — PROGRESS NOTES
[facility]        NAME: Carlos Randolph is a 84 y.o. male  : 1940    MRN: [id number]  DATE: 2024  TIME: 12:50 PM    /60   Pulse (!) 107   Temp (!) 102.7 °F (39.3 °C)   Resp 16   SpO2 94%     Assessment and Plan   Fever, unspecified fever cause [R50.9]  1. Fever, unspecified fever cause              Patient Instructions       Follow up with PCP in 3-5 days.  Proceed to  ER if symptoms worsen.    Chief Complaint     Chief Complaint   Patient presents with    URI     Cough, sore throat, swollen problems x 1 day          History of Present Illness       Pt with cough fever fatigue for several days pt states he has severe sore throat and feeling very tired    URI   Associated symptoms include coughing and a sore throat.       Review of Systems   Review of Systems   Constitutional:  Positive for fatigue and fever.   HENT:  Positive for sore throat.    Eyes: Negative.    Respiratory:  Positive for cough.    Cardiovascular: Negative.    Gastrointestinal: Negative.    Endocrine: Negative.    Genitourinary: Negative.    Musculoskeletal: Negative.    Skin: Negative.    Allergic/Immunologic: Negative.    Neurological: Negative.    Hematological: Negative.    Psychiatric/Behavioral: Negative.           Current Medications       Current Outpatient Medications:     Apoaequorin (PREVAGEN PO), Take by mouth, Disp: , Rfl:     aspirin 81 mg chewable tablet, Chew 1 tablet (81 mg total) daily, Disp: 30 tablet, Rfl: 11    atorvastatin (LIPITOR) 40 mg tablet, Take 1 tablet (40 mg total) by mouth every evening, Disp: 90 tablet, Rfl: 2    carvedilol (COREG) 6.25 mg tablet, Take 1 tablet (6.25 mg total) by mouth every 12 (twelve) hours, Disp: 60 tablet, Rfl: 4    FOLBIC 2.5-25-2 MG, Take 1 tablet by mouth daily, Disp: , Rfl: 4    isosorbide mononitrate (IMDUR) 30 mg 24 hr tablet, Take 1 tablet by mouth once daily, Disp: 30 tablet, Rfl: 0    niacin (NIASPAN) 1000 MG CR tablet, daily, Disp: , Rfl: 3     nitroglycerin (NITROSTAT) 0.4 mg SL tablet, Place 1 tablet (0.4 mg total) under the tongue every 5 (five) minutes as needed for chest pain, Disp: 25 tablet, Rfl: 11    omega-3-acid ethyl esters (LOVAZA) 1 g capsule, Take 2 g by mouth 2 (two) times a day, Disp: , Rfl:     omeprazole (PriLOSEC) 40 MG capsule, Take 40 mg by mouth daily, Disp: , Rfl:     pantoprazole (PROTONIX) 20 mg tablet, Take 20 mg by mouth daily, Disp: , Rfl:     Vitamin E 500 UNIT/GM POWD, Use, Disp: , Rfl:     famotidine (PEPCID) 40 MG tablet, Take 1 tablet (40 mg total) by mouth daily, Disp: 90 tablet, Rfl: 1    Current Allergies     Allergies as of 11/08/2024    (No Known Allergies)            The following portions of the patient's history were reviewed and updated as appropriate: allergies, current medications, past family history, past medical history, past social history, past surgical history and problem list.     Past Medical History:   Diagnosis Date    Carotid artery disease (HCC)     Claudication in peripheral vascular disease (HCC)     High cholesterol        Past Surgical History:   Procedure Laterality Date    BYPASS GRAFT  1996    CAROTID STENT  2012    COLONOSCOPY      CORONARY ARTERY BYPASS GRAFT  1995    IR AORTAGRAM WITH RUN-OFF  7/2/2020    [place] [facility] CATHJ 3RD+ ORD SLCTV ABDL PEL/LXTR BRNCH Right 7/2/2020    Procedure: ARTERIOGRAM, RIGHT LEG ANGIOGRAM, BALLOON ANGIOPLASTY AND STENTING OF RIGHT SFA;  Surgeon: Brigitte Platt MD;  Location: [facility];  Service: Vascular    VASCULAR SURGERY         Family History   Problem Relation Age of Onset    Hypertension Family     Heart disease Family     Stroke Family     No Known Problems Mother     No Known Problems Father          Medications have been verified.        Objective   /60   Pulse (!) 107   Temp (!) 102.7 °F (39.3 °C)   Resp 16   SpO2 94%        Physical Exam     Physical Exam  Vitals and nursing note reviewed.   Constitutional:       Appearance: He is normal  weight. He is ill-appearing.      Comments: Pt here with health care giver and called daughter on phone,  with fever and fatigue and cough will go to er for possible sepsis evaluation    HENT:      Head: Normocephalic and atraumatic.      Nose: Nose normal.      Mouth/Throat:      Pharynx: Posterior oropharyngeal erythema present.   Eyes:      Extraocular Movements: Extraocular movements intact.      Conjunctiva/sclera: Conjunctivae normal.      Pupils: Pupils are equal, round, and reactive to light.   Cardiovascular:      Rate and Rhythm: Normal rate and regular rhythm.      Pulses: Normal pulses.      Heart sounds: Normal heart sounds.   Pulmonary:      Effort: Pulmonary effort is normal.      Comments: Minor coarse sounds   Abdominal:      General: Bowel sounds are normal.      Palpations: Abdomen is soft.   Musculoskeletal:         General: Normal range of motion.      Cervical back: Normal range of motion and neck supple.   Neurological:      Mental Status: He is alert.

## 2024-11-09 PROBLEM — I82.409 DEEP VEIN THROMBOSIS (DVT) OF LOWER EXTREMITY (HCC): Status: ACTIVE | Noted: 2024-11-09

## 2024-11-09 LAB
ANION GAP SERPL CALCULATED.3IONS-SCNC: 11 MMOL/L (ref 4–13)
B PARAP IS1001 DNA NPH QL NAA+NON-PROBE: NOT DETECTED
B PERT.PT PRMT NPH QL NAA+NON-PROBE: NOT DETECTED
BASOPHILS # BLD AUTO: 0.01 THOUSANDS/ÂΜL (ref 0–0.1)
BASOPHILS NFR BLD AUTO: 0 % (ref 0–1)
BUN SERPL-MCNC: 15 MG/DL (ref 5–25)
C PNEUM DNA NPH QL NAA+NON-PROBE: NOT DETECTED
CALCIUM SERPL-MCNC: 8.8 MG/DL (ref 8.4–10.2)
CHLORIDE SERPL-SCNC: 108 MMOL/L (ref 96–108)
CO2 SERPL-SCNC: 24 MMOL/L (ref 21–32)
CREAT SERPL-MCNC: 0.82 MG/DL (ref 0.6–1.3)
EOSINOPHIL # BLD AUTO: 0 THOUSAND/ÂΜL (ref 0–0.61)
EOSINOPHIL NFR BLD AUTO: 0 % (ref 0–6)
ERYTHROCYTE [DISTWIDTH] IN BLOOD BY AUTOMATED COUNT: 14.5 % (ref 11.6–15.1)
FLUAV RNA NPH QL NAA+NON-PROBE: NOT DETECTED
FLUBV RNA NPH QL NAA+NON-PROBE: NOT DETECTED
GFR SERPL CREATININE-BSD FRML MDRD: 81 ML/MIN/1.73SQ M
GLUCOSE P FAST SERPL-MCNC: 107 MG/DL (ref 65–99)
GLUCOSE SERPL-MCNC: 107 MG/DL (ref 65–140)
HADV DNA NPH QL NAA+NON-PROBE: NOT DETECTED
HCOV 229E RNA NPH QL NAA+NON-PROBE: NOT DETECTED
HCOV HKU1 RNA NPH QL NAA+NON-PROBE: NOT DETECTED
HCOV NL63 RNA NPH QL NAA+NON-PROBE: NOT DETECTED
HCOV OC43 RNA NPH QL NAA+NON-PROBE: NOT DETECTED
HCT VFR BLD AUTO: 41.3 % (ref 36.5–49.3)
HGB BLD-MCNC: 13.4 G/DL (ref 12–17)
HMPV RNA NPH QL NAA+NON-PROBE: NOT DETECTED
HPIV1 RNA NPH QL NAA+NON-PROBE: NOT DETECTED
HPIV2 RNA NPH QL NAA+NON-PROBE: DETECTED
HPIV3 RNA NPH QL NAA+NON-PROBE: NOT DETECTED
HPIV4 RNA NPH QL NAA+NON-PROBE: NOT DETECTED
IMM GRANULOCYTES # BLD AUTO: 0.01 THOUSAND/UL (ref 0–0.2)
IMM GRANULOCYTES NFR BLD AUTO: 0 % (ref 0–2)
LYMPHOCYTES # BLD AUTO: 0.63 THOUSANDS/ÂΜL (ref 0.6–4.47)
LYMPHOCYTES NFR BLD AUTO: 9 % (ref 14–44)
M PNEUMO DNA NPH QL NAA+NON-PROBE: NOT DETECTED
MAGNESIUM SERPL-MCNC: 2.2 MG/DL (ref 1.9–2.7)
MCH RBC QN AUTO: 29 PG (ref 26.8–34.3)
MCHC RBC AUTO-ENTMCNC: 32.4 G/DL (ref 31.4–37.4)
MCV RBC AUTO: 89 FL (ref 82–98)
MONOCYTES # BLD AUTO: 0.59 THOUSAND/ÂΜL (ref 0.17–1.22)
MONOCYTES NFR BLD AUTO: 8 % (ref 4–12)
NEUTROPHILS # BLD AUTO: 5.83 THOUSANDS/ÂΜL (ref 1.85–7.62)
NEUTS SEG NFR BLD AUTO: 83 % (ref 43–75)
NRBC BLD AUTO-RTO: 0 /100 WBCS
PLATELET # BLD AUTO: 150 THOUSANDS/UL (ref 149–390)
PMV BLD AUTO: 10.5 FL (ref 8.9–12.7)
POTASSIUM SERPL-SCNC: 3.5 MMOL/L (ref 3.5–5.3)
RBC # BLD AUTO: 4.62 MILLION/UL (ref 3.88–5.62)
RSV RNA NPH QL NAA+NON-PROBE: NOT DETECTED
RV+EV RNA NPH QL NAA+NON-PROBE: NOT DETECTED
S EPIDERMIDIS DNA BLD POS QL NAA+NON-PRB: DETECTED
SARS-COV-2 RNA NPH QL NAA+NON-PROBE: NOT DETECTED
SODIUM SERPL-SCNC: 143 MMOL/L (ref 135–147)
WBC # BLD AUTO: 7.07 THOUSAND/UL (ref 4.31–10.16)

## 2024-11-09 PROCEDURE — 99232 SBSQ HOSP IP/OBS MODERATE 35: CPT | Performed by: INTERNAL MEDICINE

## 2024-11-09 PROCEDURE — 85025 COMPLETE CBC W/AUTO DIFF WBC: CPT

## 2024-11-09 PROCEDURE — 94760 N-INVAS EAR/PLS OXIMETRY 1: CPT

## 2024-11-09 PROCEDURE — 80048 BASIC METABOLIC PNL TOTAL CA: CPT

## 2024-11-09 PROCEDURE — 94640 AIRWAY INHALATION TREATMENT: CPT

## 2024-11-09 PROCEDURE — 87040 BLOOD CULTURE FOR BACTERIA: CPT | Performed by: INTERNAL MEDICINE

## 2024-11-09 PROCEDURE — 83735 ASSAY OF MAGNESIUM: CPT

## 2024-11-09 RX ORDER — ENOXAPARIN SODIUM 100 MG/ML
1 INJECTION SUBCUTANEOUS EVERY 12 HOURS SCHEDULED
Status: DISCONTINUED | OUTPATIENT
Start: 2024-11-09 | End: 2024-11-09

## 2024-11-09 RX ORDER — DEXAMETHASONE SODIUM PHOSPHATE 10 MG/ML
6 INJECTION, SOLUTION INTRAMUSCULAR; INTRAVENOUS ONCE
Status: COMPLETED | OUTPATIENT
Start: 2024-11-09 | End: 2024-11-09

## 2024-11-09 RX ORDER — GUAIFENESIN 600 MG/1
600 TABLET, EXTENDED RELEASE ORAL EVERY 12 HOURS SCHEDULED
Status: DISCONTINUED | OUTPATIENT
Start: 2024-11-09 | End: 2024-11-12 | Stop reason: HOSPADM

## 2024-11-09 RX ADMIN — ACETAMINOPHEN 650 MG: 325 TABLET, FILM COATED ORAL at 21:12

## 2024-11-09 RX ADMIN — APIXABAN 5 MG: 5 TABLET, FILM COATED ORAL at 18:22

## 2024-11-09 RX ADMIN — SODIUM CHLORIDE 100 ML/HR: 0.9 INJECTION, SOLUTION INTRAVENOUS at 10:50

## 2024-11-09 RX ADMIN — DEXAMETHASONE SODIUM PHOSPHATE 6 MG: 10 INJECTION, SOLUTION INTRAMUSCULAR; INTRAVENOUS at 21:13

## 2024-11-09 RX ADMIN — ISOSORBIDE MONONITRATE 30 MG: 30 TABLET, EXTENDED RELEASE ORAL at 10:39

## 2024-11-09 RX ADMIN — ASPIRIN 81 MG 81 MG: 81 TABLET ORAL at 10:38

## 2024-11-09 RX ADMIN — FAMOTIDINE 40 MG: 20 TABLET, FILM COATED ORAL at 10:38

## 2024-11-09 RX ADMIN — GUAIFENESIN 600 MG: 600 TABLET ORAL at 12:43

## 2024-11-09 RX ADMIN — ALBUTEROL SULFATE 2.5 MG: 2.5 SOLUTION RESPIRATORY (INHALATION) at 15:34

## 2024-11-09 RX ADMIN — GUAIFENESIN 600 MG: 600 TABLET ORAL at 21:13

## 2024-11-09 RX ADMIN — CARVEDILOL 6.25 MG: 6.25 TABLET, FILM COATED ORAL at 21:13

## 2024-11-09 RX ADMIN — ENOXAPARIN SODIUM 70 MG: 80 INJECTION SUBCUTANEOUS at 10:39

## 2024-11-09 RX ADMIN — CARVEDILOL 6.25 MG: 6.25 TABLET, FILM COATED ORAL at 10:38

## 2024-11-09 NOTE — ASSESSMENT & PLAN NOTE
History of CAD status post CABG x 3 in 1995  PVD status post SFA stent  Follows with cardiology  Last cardiac cath reviewed  EF normal on last nuclear stress test in 2021  Being managed medically with ASA/statin, antianginals  Currently euvolemic appearing  Plan: Continue home ASA/statin, Imdur, as needed nitroglycerin for angina

## 2024-11-09 NOTE — PLAN OF CARE
Problem: Prexisting or High Potential for Compromised Skin Integrity  Goal: Skin integrity is maintained or improved  Description: INTERVENTIONS:  - Identify patients at risk for skin breakdown  - Assess and monitor skin integrity  - Assess and monitor nutrition and hydration status  - Monitor labs   - Assess for incontinence   - Turn and reposition patient  - Assist with mobility/ambulation  - Relieve pressure over bony prominences  - Avoid friction and shearing  - Provide appropriate hygiene as needed including keeping skin clean and dry  - Evaluate need for skin moisturizer/barrier cream  - Collaborate with interdisciplinary team   - Patient/family teaching  - Consider wound care consult   Outcome: Progressing     Problem: PAIN - ADULT  Goal: Verbalizes/displays adequate comfort level or baseline comfort level  Description: Interventions:  - Encourage patient to monitor pain and request assistance  - Assess pain using appropriate pain scale  - Administer analgesics based on type and severity of pain and evaluate response  - Implement non-pharmacological measures as appropriate and evaluate response  - Consider cultural and social influences on pain and pain management  - Notify physician/advanced practitioner if interventions unsuccessful or patient reports new pain  Outcome: Progressing     Problem: INFECTION - ADULT  Goal: Absence or prevention of progression during hospitalization  Description: INTERVENTIONS:  - Assess and monitor for signs and symptoms of infection  - Monitor lab/diagnostic results  - Monitor all insertion sites, i.e. indwelling lines, tubes, and drains  - Monitor endotracheal if appropriate and nasal secretions for changes in amount and color  - [place] appropriate cooling/warming therapies per order  - Administer medications as ordered  - Instruct and encourage patient and family to use good hand hygiene technique  - Identify and instruct in appropriate isolation precautions for  Patient calling, would like to know if there was anything else she was to do. Would like to speak with a nurse. Message confirmed with caller.     identified infection/condition  Outcome: Progressing  Goal: Absence of fever/infection during neutropenic period  Description: INTERVENTIONS:  - Monitor WBC    Outcome: Progressing     Problem: SAFETY ADULT  Goal: Patient will remain free of falls  Description: INTERVENTIONS:  - Educate patient/family on patient safety including physical limitations  - Instruct patient to call for assistance with activity   - Consult OT/PT to assist with strengthening/mobility   - Keep Call bell within reach  - Keep bed low and locked with side rails adjusted as appropriate  - Keep care items and personal belongings within reach  - Initiate and maintain comfort rounds  - Make Fall Risk Sign visible to staff  - Offer Toileting every 2 Hours, in advance of need  - Initiate/Maintain bed alarm  - Obtain necessary fall risk management equipment: walker,floor cushion  - Apply yellow socks and bracelet for high fall risk patients  - Consider moving patient to room near nurses station  Outcome: Progressing     Problem: DISCHARGE PLANNING  Goal: Discharge to home or other facility with appropriate resources  Description: INTERVENTIONS:  - Identify barriers to discharge w/patient and caregiver  - Arrange for needed discharge resources and transportation as appropriate  - Identify discharge learning needs (meds, wound care, etc.)  - Arrange for interpretive services to assist at discharge as needed  - Refer to Case Management Department for coordinating discharge planning if the patient needs post-hospital services based on physician/advanced practitioner order or complex needs related to functional status, cognitive ability, or social support system  Outcome: Progressing     Problem: Knowledge Deficit  Goal: Patient/family/caregiver demonstrates understanding of disease process, treatment plan, medications, and discharge instructions  Description: Complete learning assessment and assess knowledge base.  Interventions:  - Provide teaching at  level of understanding  - Provide teaching via preferred learning methods  Outcome: Progressing     Problem: NEUROSENSORY - ADULT  Goal: Achieves stable or improved neurological status  Description: INTERVENTIONS  - Monitor and report changes in neurological status  - Monitor vital signs such as temperature, blood pressure, glucose, and any other labs ordered   - Initiate measures to prevent increased intracranial pressure  - Monitor for seizure activity and implement precautions if appropriate      Outcome: Progressing     Problem: RESPIRATORY - ADULT  Goal: Achieves optimal ventilation and oxygenation  Description: INTERVENTIONS:  - Assess for changes in respiratory status  - Assess for changes in mentation and behavior  - Position to facilitate oxygenation and minimize respiratory effort  - Oxygen administered by appropriate delivery if ordered  - Initiate smoking cessation education as indicated  - Encourage broncho-pulmonary hygiene including cough, deep breathe, Incentive Spirometry  - Assess the need for suctioning and aspirate as needed  - Assess and instruct to report SOB or any respiratory difficulty  - Respiratory Therapy support as indicated  Outcome: Progressing     Problem: GENITOURINARY - ADULT  Goal: Maintains or returns to baseline urinary function  Description: INTERVENTIONS:  - Assess urinary function  - Encourage oral fluids to ensure adequate hydration if ordered  - Administer IV fluids as ordered to ensure adequate hydration  - Administer ordered medications as needed  - Offer frequent toileting  - Follow urinary retention protocol if ordered  Outcome: Progressing  Goal: Absence of urinary retention  Description: INTERVENTIONS:  - Assess patient’s ability to void and empty bladder  - Monitor I/O  - Bladder scan as needed  - Discuss with physician/AP medications to alleviate retention as needed  - Discuss catheterization for long term situations as appropriate  Outcome: Progressing     Problem:  METABOLIC, FLUID AND ELECTROLYTES - ADULT  Goal: Electrolytes maintained within normal limits  Description: INTERVENTIONS:  - Monitor labs and assess patient for signs and symptoms of electrolyte imbalances  - Administer electrolyte replacement as ordered  - Monitor response to electrolyte replacements, including repeat lab results as appropriate  - Instruct patient on fluid and nutrition as appropriate  Outcome: Progressing

## 2024-11-09 NOTE — ASSESSMENT & PLAN NOTE
Recently diagnosed in setting of travel  Outpatient VAS duplex 10/28 showing evidence suggestive of acute occlusive deep vein thrombosis noted in a short segment of the posterior tibial vein at the proximal/mid calf.  Per caregiver, patient reportedly taking Eliquis 5mg BID as prescribed by PCP in [place]  Plan: switch to Lovenox 1mg/kg bid dosing as patient failed bedside dysphagia screen; awaiting speech eval

## 2024-11-09 NOTE — PROGRESS NOTES
Progress Note - Hospitalist   Name: Carlos Randolph 84 y.o. male I MRN: [id number]  Unit/Bed#: -01 I Date of Admission: 11/8/2024   Date of Service: 11/9/2024 I Hospital Day: 0    Assessment & Plan  URI (upper respiratory infection)  Patient presenting with URI symptoms progressively worsened today with sore throat, fevers and chills.  Seen at urgent care PTA, referred to ED for evaluation.  Febrile on admission to 102.4, tachycardic to 123, satting 88% requiring 4 L O2 NC  Initial workup largely unrevealing; CBC/CMP unremarkable, lactate 3.0 improved to 1.2 after IV fluids, procalc wnl; mag 1.7  ECG sinus tach, rate 98. Hs trops negative. BNP wnl.  Flu/RSV/COVID-negative  Strep a PCR negative  CTA PE 11/8 negative, no acute cardiopulmonary disease; subsegmental atelectasis at the left lung base seen  Blood cultures obtained in ED, given IV ceftriaxone 1 g x 1  Presentation suggestive of URI, clinical signs and symptoms not suggestive of pneumonia in addition to normal white count, procalcitonin, and any consolidation on imaging.  Tachycardia likely due to acute illness/dehydration  Plan:   Monitor off antibiotics  Await blood cultures  Obtain respiratory viral panel  Follow fever curve  Supportive care including mIVF, prn antipyretic, antiemesis  Replete magnesium  Maintain Npo pending dysphagia screen.    Respiratory protocol  Aspiration precautions.    Encourage incentive spirometry use when able.      Dementia (HCC)  History of dementia noted, requires 24-hour caregiving assistance at home  Previously followed by geriatrics outpatient  Plan: failed dysphagia screen, awaiting speech eval, delirium precautions, fall precautions    Coronary artery disease involving native coronary artery  History of CAD status post CABG x 3 in 1995  PVD status post SFA stent  Follows with cardiology  Last cardiac cath reviewed  EF normal on last nuclear stress test in 2021  Being managed medically with ASA/statin,  "antianginals  Currently euvolemic appearing  Plan: Continue home ASA/statin, Imdur, as needed nitroglycerin for angina      Sepsis (HCC)  Meeting SIRS on admission with max 1-2.4, tachycardia 123, respirations 20  Lactate elevated 3.0 on her ED arrival improved to 1.7 after IV fluid bolus.  Blood cultures obtained in ED, given IV ceftriaxone 1g x 1  Source likely URI driven  Plan: See assessment and plan for \"URI\"    Deep vein thrombosis (DVT) of lower extremity (HCC)  Recently diagnosed in setting of travel  Outpatient VAS duplex 10/28 showing evidence suggestive of acute occlusive deep vein thrombosis noted in a short segment of the posterior tibial vein at the proximal/mid calf.  Per caregiver, patient reportedly taking Eliquis 5mg BID as prescribed by PCP in [place]  Plan: patient tolerating oral intake will resume eliquis    VTE Pharmacologic Prophylaxis:   Moderate Risk (Score 3-4) - Pharmacological DVT Prophylaxis Ordered: apixaban (Eliquis).    Mobility:   Basic Mobility Inpatient Raw Score: 7  JH-HLM Goal: 2: Bed activities/Dependent transfer  JH-HLM Achieved: 2: Bed activities/Dependent transfer  JH-HLM Goal achieved. Continue to encourage appropriate mobility.    Patient Centered Rounds: I performed bedside rounds with nursing staff today.   Discussions with Specialists or Other Care Team Provider:     Education and Discussions with Family / Patient: Updated  (daughter) at bedside.    Current Length of Stay: 0 day(s)  Current Patient Status: Observation   Certification Statement: The patient will continue to require additional inpatient hospital stay due to monitoring, speech eval pending  Discharge Plan: Anticipate discharge in 48 hrs to discharge location to be determined pending rehab evaluations.    Code Status: Level 1 - Full Code    Subjective   Patient reports congestion, having difficulty swallowing able to tolerating his pills today, plan of care discussed with his daughter at " bedside    Objective :  Temp:  [98.3 °F (36.8 °C)-102.7 °F (39.3 °C)] 98.3 °F (36.8 °C)  HR:  [] 89  BP: (115-192)/(60-91) 132/69  Resp:  [16-22] 22  SpO2:  [88 %-95 %] 95 %  O2 Device: Nasal cannula  Nasal Cannula O2 Flow Rate (L/min):  [4 L/min] 4 L/min    Body mass index is 28.24 kg/m².     Input and Output Summary (last 24 hours):     Intake/Output Summary (Last 24 hours) at 11/9/2024 1108  Last data filed at 11/9/2024 0601  Gross per 24 hour   Intake 1170 ml   Output 260 ml   Net 910 ml       Physical Exam  Vitals and nursing note reviewed.   Constitutional:       General: He is not in acute distress.     Appearance: He is well-developed. He is not toxic-appearing or diaphoretic.   HENT:      Head: Normocephalic and atraumatic.   Eyes:      General: No scleral icterus.     Conjunctiva/sclera: Conjunctivae normal.   Cardiovascular:      Rate and Rhythm: Normal rate and regular rhythm.      Heart sounds: No murmur heard.     No friction rub. No gallop.   Pulmonary:      Effort: Pulmonary effort is normal. No respiratory distress.      Breath sounds: No stridor. Rhonchi present. No wheezing or rales.   Chest:      Chest wall: No tenderness.   Abdominal:      General: There is no distension.      Palpations: Abdomen is soft. There is no mass.      Tenderness: There is no abdominal tenderness. There is no guarding or rebound.      Hernia: No hernia is present.   Musculoskeletal:         General: No swelling or tenderness.      Cervical back: Neck supple.   Skin:     General: Skin is warm and dry.      Capillary Refill: Capillary refill takes less than 2 seconds.   Neurological:      Mental Status: He is alert.   Psychiatric:         Mood and Affect: Mood normal.           Lines/Drains:  Lines/Drains/Airways       Active Status       Name Placement date Placement time Site Days    External Urinary Catheter Small 11/08/24  2345  -- less than 1                            Lab Results: I have reviewed the following  results:   Results from last 7 days   Lab Units 11/09/24  0452   WBC Thousand/uL 7.07   HEMOGLOBIN g/dL 13.4   HEMATOCRIT % 41.3   PLATELETS Thousands/uL 150   SEGS PCT % 83*   LYMPHO PCT % 9*   MONO PCT % 8   EOS PCT % 0     Results from last 7 days   Lab Units 11/09/24  0452 11/08/24  1424   SODIUM mmol/L 143 136   POTASSIUM mmol/L 3.5 4.7   CHLORIDE mmol/L 108 102   CO2 mmol/L 24 25   BUN mg/dL 15 20   CREATININE mg/dL 0.82 0.95   ANION GAP mmol/L 11 9   CALCIUM mg/dL 8.8 9.3   ALBUMIN g/dL  --  4.3   TOTAL BILIRUBIN mg/dL  --  0.70   ALK PHOS U/L  --  85   ALT U/L  --  30   AST U/L  --  42*   GLUCOSE RANDOM mg/dL 107 96     Results from last 7 days   Lab Units 11/08/24  1424   INR  1.35*             Results from last 7 days   Lab Units 11/08/24  1747 11/08/24  1424   LACTIC ACID mmol/L 1.2 3.0*   PROCALCITONIN ng/ml  --  0.09       Recent Cultures (last 7 days):   Results from last 7 days   Lab Units 11/08/24  1424   BLOOD CULTURE  Received in Microbiology Lab. Culture in Progress.  Received in Microbiology Lab. Culture in Progress.       Imaging Results Review: No pertinent imaging studies reviewed.  Other Study Results Review: No additional pertinent studies reviewed.    Last 24 Hours Medication List:     Current Facility-Administered Medications:     acetaminophen (TYLENOL) tablet 650 mg, Q6H PRN    albuterol inhalation solution 2.5 mg, Q6H PRN    aspirin chewable tablet 81 mg, Daily    carvedilol (COREG) tablet 6.25 mg, Q12H GUNNAR    enoxaparin (LOVENOX) subcutaneous injection 70 mg, Q12H GUNNAR    famotidine (PEPCID) tablet 40 mg, Daily    guaiFENesin (MUCINEX) 12 hr tablet 600 mg, Q12H GUNNAR    isosorbide mononitrate (IMDUR) 24 hr tablet 30 mg, Daily    nitroglycerin (NITROSTAT) SL tablet 0.4 mg, Q5 Min PRN    ondansetron (ZOFRAN) injection 4 mg, Q6H PRN    polyethylene glycol (MIRALAX) packet 17 g, Daily PRN    sodium chloride 0.9 % infusion, Continuous, Last Rate: 100 mL/hr (11/09/24 1050)    Administrative  Statements   Today, Patient Was Seen By: Omar Martinez DO      **Please Note: This note may have been constructed using a voice recognition system.**

## 2024-11-09 NOTE — ASSESSMENT & PLAN NOTE
Patient presenting with URI symptoms progressively worsened today with sore throat, fevers and chills.  Seen at urgent care PTA, referred to ED for evaluation.  Febrile on admission to 102.4, tachycardic to 123, satting 88% requiring 4 L O2 NC  Initial workup largely unrevealing; CBC/CMP unremarkable, lactate 3.0 improved to 1.2 after IV fluids, procalc wnl; mag 1.7  ECG sinus tach, rate 98. Hs trops negative. BNP wnl.  Flu/RSV/COVID-negative  Strep a PCR negative  CTA PE 11/8 negative, no acute cardiopulmonary disease; subsegmental atelectasis at the left lung base seen  Blood cultures obtained in ED, given IV ceftriaxone 1 g x 1  Presentation suggestive of URI, clinical signs and symptoms not suggestive of pneumonia in addition to normal white count, procalcitonin, and any consolidation on imaging.  Tachycardia likely due to acute illness/dehydration  Plan:   Monitor off antibiotics  Await blood cultures  Obtain respiratory viral panel  Follow fever curve  Supportive care including mIVF, prn antipyretic, antiemesis  Replete magnesium  Maintain Npo pending dysphagia screen.    Respiratory protocol  Aspiration precautions.    Encourage incentive spirometry use when able.

## 2024-11-09 NOTE — ASSESSMENT & PLAN NOTE
Recently diagnosed in setting of travel  Outpatient VAS duplex 10/28 showing evidence suggestive of acute occlusive deep vein thrombosis noted in a short segment of the posterior tibial vein at the proximal/mid calf.  Per caregiver, patient reportedly taking Eliquis 5mg BID as prescribed by PCP in [place]  Plan: patient tolerating oral intake will resume eliquis

## 2024-11-09 NOTE — ASSESSMENT & PLAN NOTE
"Meeting SIRS on admission with max 1-2.4, tachycardia 123, respirations 20  Lactate elevated 3.0 on her ED arrival improved to 1.7 after IV fluid bolus.  Blood cultures obtained in ED, given IV ceftriaxone 1g x 1  Source likely URI driven  Plan: See assessment and plan for \"URI\"    "

## 2024-11-09 NOTE — H&P
H&P - Hospitalist   Name: Carlos Randolph 84 y.o. male I MRN: [id number]  Unit/Bed#: -01 I Date of Admission: 11/8/2024   Date of Service: 11/9/2024 I Hospital Day: 0     Assessment & Plan  URI (upper respiratory infection)  Patient presenting with URI symptoms progressively worsened today with sore throat, fevers and chills.  Seen at urgent care PTA, referred to ED for evaluation.  Febrile on admission to 102.4, tachycardic to 123, satting 88% requiring 4 L O2 NC  Initial workup largely unrevealing; CBC/CMP unremarkable, lactate 3.0 improved to 1.2 after IV fluids, procalc wnl; mag 1.7  ECG sinus tach, rate 98. Hs trops negative. BNP wnl.  Flu/RSV/COVID-negative  Strep a PCR negative  CTA PE 11/8 negative, no acute cardiopulmonary disease; subsegmental atelectasis at the left lung base seen  Blood cultures obtained in ED, given IV ceftriaxone 1 g x 1  Presentation suggestive of URI, clinical signs and symptoms not suggestive of pneumonia in addition to normal white count, procalcitonin, and any consolidation on imaging.  Tachycardia likely due to acute illness/dehydration  Plan:   Monitor off antibiotics  Await blood cultures  Obtain respiratory viral panel  Follow fever curve  Supportive care including mIVF, prn antipyretic, antiemesis  Replete magnesium  Maintain Npo pending dysphagia screen.    Respiratory protocol  Aspiration precautions.    Encourage incentive spirometry use when able.      Dementia (HCC)  History of dementia noted, requires 24-hour caregiving assistance at home  Previously followed by geriatrics outpatient  Plan: failed dysphagia screen, awaiting speech eval, delirium precautions, fall precautions    Coronary artery disease involving native coronary artery  History of CAD status post CABG x 3 in 1995  PVD status post SFA stent  Follows with cardiology  Last cardiac cath reviewed  EF normal on last nuclear stress test in 2021  Being managed medically with ASA/statin,  "antianginals  Currently euvolemic appearing  Plan: Continue home ASA/statin, Imdur, as needed nitroglycerin for angina      Sepsis (HCC)  Meeting SIRS on admission with max 1-2.4, tachycardia 123, respirations 20  Lactate elevated 3.0 on her ED arrival improved to 1.7 after IV fluid bolus.  Blood cultures obtained in ED, given IV ceftriaxone 1g x 1  Source likely URI driven  Plan: See assessment and plan for \"URI\"    Deep vein thrombosis (DVT) of lower extremity (HCC)  Recently diagnosed in setting of travel  Outpatient VAS duplex 10/28 showing evidence suggestive of acute occlusive deep vein thrombosis noted in a short segment of the posterior tibial vein at the proximal/mid calf.  Per caregiver, patient reportedly taking Eliquis 5mg BID as prescribed by PCP in [place]  Plan: switch to Lovenox 1mg/kg bid dosing as patient failed bedside dysphagia screen; awaiting speech eval      VTE Pharmacologic Prophylaxis:   Moderate Risk (Score 3-4) - Pharmacological DVT Prophylaxis Ordered: heparin.  Code Status: Level 1 - Full Code confirmed with POA (daughter)  Discussion with family: Updated  (daughter) at bedside.    Anticipated Length of Stay: Patient will be admitted on an inpatient basis with an anticipated length of stay of greater than 2 midnights secondary to patient evaluation and management.    History of Present Illness   Chief Complaint: sore throat    Carlos Randolph is a 84 y.o. male with a PMH of CAD status post CABG in remote past, PVD, dementia who presents to ED upon recommendation of urgent care provider for evaluation of several days of URI symptoms including cough, subjective fevers, and sore throat which he endorsed today to his caregiver.  Possible reported sick contacts with caregiver/daughter.  Patient was otherwise in normal state of health prior to symptom onset.  He denies any nausea/vomiting/diarrhea/abdominal pain.  No orthopnea/leg swelling reported.  History primarily provided by " daughter and caregiver at bedside.  Per caregiver, patient requires 24-hour assistance however patient is reported to be somewhat independent with daily ADLs at home.  No recent illnesses reported.    Review of Systems  All review of systems negative unless otherwise specified in HPI.    Historical Information   Past Medical History:   Diagnosis Date    Carotid artery disease (HCC)     Claudication in peripheral vascular disease (HCC)     High cholesterol      Past Surgical History:   Procedure Laterality Date    BYPASS GRAFT      CAROTID STENT      COLONOSCOPY      CORONARY ARTERY BYPASS GRAFT      IR AORTAGRAM WITH RUN-OFF  2020    [facility] SLCTV CATHJ 3RD+ ORD SLCTV ABDL PEL/LXTR BRNCH Right 2020    Procedure: ARTERIOGRAM, RIGHT LEG ANGIOGRAM, BALLOON ANGIOPLASTY AND STENTING OF RIGHT SFA;  Surgeon: Brigitte Platt MD;  Location: [facility];  Service: Vascular    VASCULAR SURGERY       Social History     Tobacco Use    Smoking status: Former     Current packs/day: 0.00     Types: Cigarettes     Quit date:      Years since quittin.8    Smokeless tobacco: Never   Vaping Use    Vaping status: Never Used   Substance and Sexual Activity    Alcohol use: Not Currently     Comment: social     Drug use: Never    Sexual activity: Not Currently     E-Cigarette/Vaping    E-Cigarette Use Never User      E-Cigarette/Vaping Substances    Nicotine No     THC No     CBD No     Flavoring No     Other No     Unknown No        Social History:  Marital Status:    Occupation: Retired  Patient Pre-hospital Living Situation: Home  Patient Pre-hospital Level of Mobility: walks  Patient Pre-hospital Diet Restrictions: None    Meds/Allergies   I have reviewed home medications with patient family member.  Prior to Admission medications    Medication Sig Start Date End Date Taking? Authorizing Provider   Apoaequorin (PREVAGEN PO) Take by mouth    Historical Provider, MD   aspirin 81 mg chewable tablet Chew 1  tablet (81 mg total) daily 6/19/21   JACOBO Guerra   atorvastatin (LIPITOR) 40 mg tablet Take 1 tablet (40 mg total) by mouth every evening 5/24/24 2/18/25  JACOBO Godinez   carvedilol (COREG) 6.25 mg tablet Take 1 tablet (6.25 mg total) by mouth every 12 (twelve) hours 6/18/21   JACOBO Guerra   famotidine (PEPCID) 40 MG tablet Take 1 tablet (40 mg total) by mouth daily 4/26/22 11/14/22  Jennifer Matthews PA-C   FOLBIC 2.5-25-2 MG Take 1 tablet by mouth daily 6/26/19   Historical Provider, MD   isosorbide mononitrate (IMDUR) 30 mg 24 hr tablet Take 1 tablet by mouth once daily 6/7/22   Carlos Hernandez MD   niacin (NIASPAN) 1000 MG CR tablet daily 5/3/19   Historical Provider, MD   nitroglycerin (NITROSTAT) 0.4 mg SL tablet Place 1 tablet (0.4 mg total) under the tongue every 5 (five) minutes as needed for chest pain 6/4/21   Luke Guardado MD   dlqiq-8-dkvq ethyl esters (LOVAZA) 1 g capsule Take 2 g by mouth 2 (two) times a day    Historical Provider, MD   omeprazole (PriLOSEC) 40 MG capsule Take 40 mg by mouth daily    Historical Provider, MD   pantoprazole (PROTONIX) 20 mg tablet Take 20 mg by mouth daily 5/25/22   Historical Provider, MD   Vitamin E 500 UNIT/GM POWD Use    Historical Provider, MD     No Known Allergies    Objective :  Temp:  [99 °F (37.2 °C)-102.7 °F (39.3 °C)] (P) 99.4 °F (37.4 °C)  HR:  [] (P) 99  BP: (128-192)/(60-91) (P) 115/73  Resp:  [16-22] (P) 20  SpO2:  [88 %-94 %] 90 %  O2 Device: Nasal cannula  Nasal Cannula O2 Flow Rate (L/min):  [4 L/min] 4 L/min    Physical Exam  Vitals and nursing note reviewed.   Constitutional:       General: He is not in acute distress.     Appearance: He is well-developed. He is ill-appearing.   HENT:      Head: Normocephalic and atraumatic.   Eyes:      Conjunctiva/sclera: Conjunctivae normal.   Cardiovascular:      Rate and Rhythm: Regular rhythm. Tachycardia present.      Heart sounds: No murmur heard.  Pulmonary:       Effort: Pulmonary effort is normal. No respiratory distress.      Breath sounds: Normal breath sounds.      Comments: 4 L O2  Abdominal:      Palpations: Abdomen is soft.      Tenderness: There is no abdominal tenderness.   Musculoskeletal:         General: No swelling.      Cervical back: Neck supple.   Skin:     General: Skin is warm and dry.      Capillary Refill: Capillary refill takes less than 2 seconds.   Neurological:      Mental Status: He is alert.   Psychiatric:         Mood and Affect: Mood normal.          Lines/Drains:  Lines/Drains/Airways       Active Status       Name Placement date Placement time Site Days    External Urinary Catheter Small 11/08/24  2345  -- less than 1                Peripheral IV x 2 (R, L)          Lab Results: I have reviewed the following results:  Results from last 7 days   Lab Units 11/08/24  1424   WBC Thousand/uL 8.77   HEMOGLOBIN g/dL 14.5   HEMATOCRIT % 45.0   PLATELETS Thousands/uL 177   SEGS PCT % 68   LYMPHO PCT % 18   MONO PCT % 14*   EOS PCT % 0     Results from last 7 days   Lab Units 11/08/24  1424   SODIUM mmol/L 136   POTASSIUM mmol/L 4.7   CHLORIDE mmol/L 102   CO2 mmol/L 25   BUN mg/dL 20   CREATININE mg/dL 0.95   ANION GAP mmol/L 9   CALCIUM mg/dL 9.3   ALBUMIN g/dL 4.3   TOTAL BILIRUBIN mg/dL 0.70   ALK PHOS U/L 85   ALT U/L 30   AST U/L 42*   GLUCOSE RANDOM mg/dL 96     Results from last 7 days   Lab Units 11/08/24  1424   INR  1.35*         Lab Results   Component Value Date    HGBA1C 5.6 02/16/2021     Results from last 7 days   Lab Units 11/08/24  1747 11/08/24  1424   LACTIC ACID mmol/L 1.2 3.0*   PROCALCITONIN ng/ml  --  0.09       CT pe study w abdomen pelvis w contrast   Final Result by Lc Wilkerson MD (11/08 1722)         1. No evidence of acute pulmonary embolus or thoracic aortic aneurysm. No acute cardiopulmonary process.   2. Findings compatible with constipation. No evidence of colitis, diverticulitis or appendicitis.                      Workstation performed: [id number]         CT head without contrast   Final Result by Foreign Stanton MD (11/08 1630)      No acute intracranial abnormality.      Similar mild chronic microangiopathy.            Workstation performed: [id number]         XR chest portable   Final Result by Noah Heredia MD (11/08 1540)      Right infrahilar opacity can represent pneumonia in the appropriate clinical setting.            Workstation performed: [id number]               Administrative Statements       ** Please Note: This note has been constructed using a voice recognition system. **

## 2024-11-09 NOTE — RESPIRATORY THERAPY NOTE
RT Protocol Note  Carlos Randolph 84 y.o. male MRN: [id number]  Unit/Bed#: -01 Encounter: [id number]    Assessment    Principal Problem:    URI (upper respiratory infection)  Active Problems:    Dementia (HCC)    Coronary artery disease involving native coronary artery    Sepsis (HCC)      Home Pulmonary Medications:  NONE   11/08/24 2142   Respiratory Protocol   Protocol Initiated? Yes   Protocol Selection Respiratory   Language Barrier? Yes  (pt caretacker provided answers)   Medical & Social History Reviewed? Yes   Diagnostic Studies Reviewed? Yes   Physical Assessment Performed? Yes   Respiratory Plan No distress/Pulmonary history   Respiratory Assessment   Assessment Type Assess only   General Appearance Sleeping   Respiratory Pattern Normal   Chest Assessment Chest expansion symmetrical   Bilateral Breath Sounds Diminished;Coarse   Cough None   Resp Comments Pt assessed for Respiratory Protocol.  BS diminished, coarse, SP2 90% on room air. Pt without pulmonary history. Will add Alb Q6 prn.   O2 Device 4L   Additional Assessments   Pulse (!) 124   Respirations 20   SpO2 90 %            Past Medical History:   Diagnosis Date    Carotid artery disease (HCC)     Claudication in peripheral vascular disease (HCC)     High cholesterol                     Objective    Physical Exam:   Assessment Type: Assess only  General Appearance: Sleeping  Respiratory Pattern: Normal  Chest Assessment: Chest expansion symmetrical  Bilateral Breath Sounds: Diminished, Coarse  Cough: None  O2 Device: 4L    Vitals:  Blood pressure 162/69, pulse (!) 124, temperature (!) 102.4 °F (39.1 °C), temperature source Oral, resp. rate 20, SpO2 90%.          Imaging and other studies: Results Review Statement: I reviewed radiology reports from this admission including: chest xray.    O2 Device: 4L     Plan    Respiratory Plan: No distress/Pulmonary history        Resp Comments: Pt assessed for Respiratory Protocol.  BS diminished, coarse,  SP2 90% on room air. Pt without pulmonary history. Will add Alb Q6 prn.

## 2024-11-09 NOTE — PLAN OF CARE
Problem: Prexisting or High Potential for Compromised Skin Integrity  Goal: Skin integrity is maintained or improved  Description: INTERVENTIONS:  - Identify patients at risk for skin breakdown  - Assess and monitor skin integrity  - Assess and monitor nutrition and hydration status  - Monitor labs   - Assess for incontinence   - Turn and reposition patient  - Assist with mobility/ambulation  - Relieve pressure over bony prominences  - Avoid friction and shearing  - Provide appropriate hygiene as needed including keeping skin clean and dry  - Evaluate need for skin moisturizer/barrier cream  - Collaborate with interdisciplinary team   - Patient/family teaching  - Consider wound care consult   Outcome: Progressing     Problem: PAIN - ADULT  Goal: Verbalizes/displays adequate comfort level or baseline comfort level  Description: Interventions:  - Encourage patient to monitor pain and request assistance  - Assess pain using appropriate pain scale  - Administer analgesics based on type and severity of pain and evaluate response  - Implement non-pharmacological measures as appropriate and evaluate response  - Consider cultural and social influences on pain and pain management  - Notify physician/advanced practitioner if interventions unsuccessful or patient reports new pain  Outcome: Progressing     Problem: INFECTION - ADULT  Goal: Absence or prevention of progression during hospitalization  Description: INTERVENTIONS:  - Assess and monitor for signs and symptoms of infection  - Monitor lab/diagnostic results  - Monitor all insertion sites, i.e. indwelling lines, tubes, and drains  - Monitor endotracheal if appropriate and nasal secretions for changes in amount and color  - [place] appropriate cooling/warming therapies per order  - Administer medications as ordered  - Instruct and encourage patient and family to use good hand hygiene technique  - Identify and instruct in appropriate isolation precautions for  identified infection/condition  Outcome: Progressing     Problem: SAFETY ADULT  Goal: Patient will remain free of falls  Description: INTERVENTIONS:  - Educate patient/family on patient safety including physical limitations  - Instruct patient to call for assistance with activity   - Consult OT/PT to assist with strengthening/mobility   - Keep Call bell within reach  - Keep bed low and locked with side rails adjusted as appropriate  - Keep care items and personal belongings within reach  - Initiate and maintain comfort rounds  - Make Fall Risk Sign visible to staff  - Offer Toileting every 2 Hours, in advance of need  - Initiate/Maintain alarm  - Obtain necessary fall risk management equipment:   - Apply yellow socks and bracelet for high fall risk patients  - Consider moving patient to room near nurses station  Outcome: Progressing     Problem: DISCHARGE PLANNING  Goal: Discharge to home or other facility with appropriate resources  Description: INTERVENTIONS:  - Identify barriers to discharge w/patient and caregiver  - Arrange for needed discharge resources and transportation as appropriate  - Identify discharge learning needs (meds, wound care, etc.)  - Arrange for interpretive services to assist at discharge as needed  - Refer to Case Management Department for coordinating discharge planning if the patient needs post-hospital services based on physician/advanced practitioner order or complex needs related to functional status, cognitive ability, or social support system  Outcome: Progressing     Problem: Knowledge Deficit  Goal: Patient/family/caregiver demonstrates understanding of disease process, treatment plan, medications, and discharge instructions  Description: Complete learning assessment and assess knowledge base.  Interventions:  - Provide teaching at level of understanding  - Provide teaching via preferred learning methods  Outcome: Progressing     Problem: NEUROSENSORY - ADULT  Goal: Achieves stable  or improved neurological status  Description: INTERVENTIONS  - Monitor and report changes in neurological status  - Monitor vital signs such as temperature, blood pressure, glucose, and any other labs ordered   - Initiate measures to prevent increased intracranial pressure  - Monitor for seizure activity and implement precautions if appropriate      Outcome: Progressing     Problem: RESPIRATORY - ADULT  Goal: Achieves optimal ventilation and oxygenation  Description: INTERVENTIONS:  - Assess for changes in respiratory status  - Assess for changes in mentation and behavior  - Position to facilitate oxygenation and minimize respiratory effort  - Oxygen administered by appropriate delivery if ordered  - Initiate smoking cessation education as indicated  - Encourage broncho-pulmonary hygiene including cough, deep breathe, Incentive Spirometry  - Assess the need for suctioning and aspirate as needed  - Assess and instruct to report SOB or any respiratory difficulty  - Respiratory Therapy support as indicated  Outcome: Progressing     Problem: GENITOURINARY - ADULT  Goal: Maintains or returns to baseline urinary function  Description: INTERVENTIONS:  - Assess urinary function  - Encourage oral fluids to ensure adequate hydration if ordered  - Administer IV fluids as ordered to ensure adequate hydration  - Administer ordered medications as needed  - Offer frequent toileting  - Follow urinary retention protocol if ordered  Outcome: Progressing  Goal: Absence of urinary retention  Description: INTERVENTIONS:  - Assess patient’s ability to void and empty bladder  - Monitor I/O  - Bladder scan as needed  - Discuss with physician/AP medications to alleviate retention as needed  - Discuss catheterization for long term situations as appropriate  Outcome: Progressing     Problem: METABOLIC, FLUID AND ELECTROLYTES - ADULT  Goal: Electrolytes maintained within normal limits  Description: INTERVENTIONS:  - Monitor labs and assess  patient for signs and symptoms of electrolyte imbalances  - Administer electrolyte replacement as ordered  - Monitor response to electrolyte replacements, including repeat lab results as appropriate  - Instruct patient on fluid and nutrition as appropriate  Outcome: Progressing

## 2024-11-09 NOTE — ASSESSMENT & PLAN NOTE
History of dementia noted, requires 24-hour caregiving assistance at home  Previously followed by geriatrics outpatient  Plan: failed dysphagia screen, awaiting speech eval, delirium precautions, fall precautions

## 2024-11-10 PROBLEM — G93.41 METABOLIC ENCEPHALOPATHY: Status: ACTIVE | Noted: 2024-11-10

## 2024-11-10 LAB
ALBUMIN SERPL BCG-MCNC: 3.4 G/DL (ref 3.5–5)
ALP SERPL-CCNC: 52 U/L (ref 34–104)
ALT SERPL W P-5'-P-CCNC: 40 U/L (ref 7–52)
ANION GAP SERPL CALCULATED.3IONS-SCNC: 8 MMOL/L (ref 4–13)
AST SERPL W P-5'-P-CCNC: 56 U/L (ref 13–39)
BASOPHILS # BLD AUTO: 0.01 THOUSANDS/ÂΜL (ref 0–0.1)
BASOPHILS NFR BLD AUTO: 0 % (ref 0–1)
BILIRUB SERPL-MCNC: 0.88 MG/DL (ref 0.2–1)
BUN SERPL-MCNC: 20 MG/DL (ref 5–25)
CALCIUM ALBUM COR SERPL-MCNC: 9.3 MG/DL (ref 8.3–10.1)
CALCIUM SERPL-MCNC: 8.8 MG/DL (ref 8.4–10.2)
CHLORIDE SERPL-SCNC: 107 MMOL/L (ref 96–108)
CO2 SERPL-SCNC: 22 MMOL/L (ref 21–32)
CREAT SERPL-MCNC: 0.74 MG/DL (ref 0.6–1.3)
EOSINOPHIL # BLD AUTO: 0.01 THOUSAND/ÂΜL (ref 0–0.61)
EOSINOPHIL NFR BLD AUTO: 0 % (ref 0–6)
ERYTHROCYTE [DISTWIDTH] IN BLOOD BY AUTOMATED COUNT: 14.7 % (ref 11.6–15.1)
GFR SERPL CREATININE-BSD FRML MDRD: 85 ML/MIN/1.73SQ M
GLUCOSE SERPL-MCNC: 166 MG/DL (ref 65–140)
HCT VFR BLD AUTO: 37.3 % (ref 36.5–49.3)
HGB BLD-MCNC: 11.7 G/DL (ref 12–17)
IMM GRANULOCYTES # BLD AUTO: 0.02 THOUSAND/UL (ref 0–0.2)
IMM GRANULOCYTES NFR BLD AUTO: 0 % (ref 0–2)
LYMPHOCYTES # BLD AUTO: 0.56 THOUSANDS/ÂΜL (ref 0.6–4.47)
LYMPHOCYTES NFR BLD AUTO: 8 % (ref 14–44)
MCH RBC QN AUTO: 29.2 PG (ref 26.8–34.3)
MCHC RBC AUTO-ENTMCNC: 31.4 G/DL (ref 31.4–37.4)
MCV RBC AUTO: 93 FL (ref 82–98)
MONOCYTES # BLD AUTO: 0.3 THOUSAND/ÂΜL (ref 0.17–1.22)
MONOCYTES NFR BLD AUTO: 4 % (ref 4–12)
NEUTROPHILS # BLD AUTO: 6.58 THOUSANDS/ÂΜL (ref 1.85–7.62)
NEUTS SEG NFR BLD AUTO: 88 % (ref 43–75)
NRBC BLD AUTO-RTO: 0 /100 WBCS
PLATELET # BLD AUTO: 130 THOUSANDS/UL (ref 149–390)
PMV BLD AUTO: 10.8 FL (ref 8.9–12.7)
POTASSIUM SERPL-SCNC: 4.2 MMOL/L (ref 3.5–5.3)
PROT SERPL-MCNC: 6.2 G/DL (ref 6.4–8.4)
RBC # BLD AUTO: 4.01 MILLION/UL (ref 3.88–5.62)
SODIUM SERPL-SCNC: 137 MMOL/L (ref 135–147)
WBC # BLD AUTO: 7.48 THOUSAND/UL (ref 4.31–10.16)

## 2024-11-10 PROCEDURE — 99232 SBSQ HOSP IP/OBS MODERATE 35: CPT | Performed by: INTERNAL MEDICINE

## 2024-11-10 PROCEDURE — 85025 COMPLETE CBC W/AUTO DIFF WBC: CPT | Performed by: INTERNAL MEDICINE

## 2024-11-10 PROCEDURE — 80053 COMPREHEN METABOLIC PANEL: CPT | Performed by: INTERNAL MEDICINE

## 2024-11-10 RX ORDER — IBUPROFEN 400 MG/1
400 TABLET, FILM COATED ORAL EVERY 6 HOURS PRN
Status: DISCONTINUED | OUTPATIENT
Start: 2024-11-10 | End: 2024-11-11

## 2024-11-10 RX ORDER — DEXAMETHASONE SODIUM PHOSPHATE 10 MG/ML
6 INJECTION, SOLUTION INTRAMUSCULAR; INTRAVENOUS ONCE
Status: COMPLETED | OUTPATIENT
Start: 2024-11-10 | End: 2024-11-10

## 2024-11-10 RX ADMIN — APIXABAN 5 MG: 5 TABLET, FILM COATED ORAL at 19:23

## 2024-11-10 RX ADMIN — APIXABAN 5 MG: 5 TABLET, FILM COATED ORAL at 09:42

## 2024-11-10 RX ADMIN — DEXAMETHASONE SODIUM PHOSPHATE 6 MG: 10 INJECTION, SOLUTION INTRAMUSCULAR; INTRAVENOUS at 10:46

## 2024-11-10 RX ADMIN — FAMOTIDINE 40 MG: 20 TABLET, FILM COATED ORAL at 09:42

## 2024-11-10 RX ADMIN — CARVEDILOL 6.25 MG: 6.25 TABLET, FILM COATED ORAL at 09:42

## 2024-11-10 RX ADMIN — ASPIRIN 81 MG 81 MG: 81 TABLET ORAL at 09:42

## 2024-11-10 RX ADMIN — GUAIFENESIN 600 MG: 600 TABLET ORAL at 20:06

## 2024-11-10 RX ADMIN — ISOSORBIDE MONONITRATE 30 MG: 30 TABLET, EXTENDED RELEASE ORAL at 09:42

## 2024-11-10 RX ADMIN — GUAIFENESIN 600 MG: 600 TABLET ORAL at 09:42

## 2024-11-10 NOTE — ASSESSMENT & PLAN NOTE
Patient presenting with URI symptoms progressively worsened today with sore throat, fevers and chills.  Seen at urgent care PTA, referred to ED for evaluation.  Febrile on admission to 102.4, tachycardic to 123, satting 88% requiring 4 L O2 NC  Initial workup largely unrevealing; CBC/CMP unremarkable, lactate 3.0 improved to 1.2 after IV fluids, procalc wnl; mag 1.7  ECG sinus tach, rate 98. Hs trops negative. BNP wnl.  Flu/RSV/COVID-negative  Strep a PCR negative  CTA PE 11/8 negative, no acute cardiopulmonary disease; subsegmental atelectasis at the left lung base seen  Blood cultures obtained in ED, given IV ceftriaxone 1 g x 1  Presentation suggestive of URI, clinical signs and symptoms not suggestive of pneumonia in addition to normal white count, procalcitonin, and any consolidation on imaging.  Tachycardia likely due to acute illness/dehydration  Plan:   Monitor off antibiotics  Positive for parainfluenza  Continue decadron  Wean oyxgen as tolerated

## 2024-11-10 NOTE — PLAN OF CARE
Problem: Prexisting or High Potential for Compromised Skin Integrity  Goal: Skin integrity is maintained or improved  Description: INTERVENTIONS:  - Identify patients at risk for skin breakdown  - Assess and monitor skin integrity  - Assess and monitor nutrition and hydration status  - Monitor labs   - Assess for incontinence   - Turn and reposition patient  - Assist with mobility/ambulation  - Relieve pressure over bony prominences  - Avoid friction and shearing  - Provide appropriate hygiene as needed including keeping skin clean and dry  - Evaluate need for skin moisturizer/barrier cream  - Collaborate with interdisciplinary team   - Patient/family teaching  - Consider wound care consult   Outcome: Progressing     Problem: PAIN - ADULT  Goal: Verbalizes/displays adequate comfort level or baseline comfort level  Description: Interventions:  - Encourage patient to monitor pain and request assistance  - Assess pain using appropriate pain scale  - Administer analgesics based on type and severity of pain and evaluate response  - Implement non-pharmacological measures as appropriate and evaluate response  - Consider cultural and social influences on pain and pain management  - Notify physician/advanced practitioner if interventions unsuccessful or patient reports new pain  Outcome: Progressing     Problem: INFECTION - ADULT  Goal: Absence or prevention of progression during hospitalization  Description: INTERVENTIONS:  - Assess and monitor for signs and symptoms of infection  - Monitor lab/diagnostic results  - Monitor all insertion sites, i.e. indwelling lines, tubes, and drains  - Monitor endotracheal if appropriate and nasal secretions for changes in amount and color  - [place] appropriate cooling/warming therapies per order  - Administer medications as ordered  - Instruct and encourage patient and family to use good hand hygiene technique  - Identify and instruct in appropriate isolation precautions for  identified infection/condition  Outcome: Progressing     Problem: SAFETY ADULT  Goal: Patient will remain free of falls  Description: INTERVENTIONS:  - Educate patient/family on patient safety including physical limitations  - Instruct patient to call for assistance with activity   - Consult OT/PT to assist with strengthening/mobility   - Keep Call bell within reach  - Keep bed low and locked with side rails adjusted as appropriate  - Keep care items and personal belongings within reach  - Initiate and maintain comfort rounds  - Make Fall Risk Sign visible to staff  - Offer Toileting every 2 Hours, in advance of need  - Initiate/Maintain alarm  - Obtain necessary fall risk management equipment:   - Apply yellow socks and bracelet for high fall risk patients  - Consider moving patient to room near nurses station  Outcome: Progressing     Problem: DISCHARGE PLANNING  Goal: Discharge to home or other facility with appropriate resources  Description: INTERVENTIONS:  - Identify barriers to discharge w/patient and caregiver  - Arrange for needed discharge resources and transportation as appropriate  - Identify discharge learning needs (meds, wound care, etc.)  - Arrange for interpretive services to assist at discharge as needed  - Refer to Case Management Department for coordinating discharge planning if the patient needs post-hospital services based on physician/advanced practitioner order or complex needs related to functional status, cognitive ability, or social support system  Outcome: Progressing     Problem: Knowledge Deficit  Goal: Patient/family/caregiver demonstrates understanding of disease process, treatment plan, medications, and discharge instructions  Description: Complete learning assessment and assess knowledge base.  Interventions:  - Provide teaching at level of understanding  - Provide teaching via preferred learning methods  Outcome: Progressing     Problem: NEUROSENSORY - ADULT  Goal: Achieves stable  or improved neurological status  Description: INTERVENTIONS  - Monitor and report changes in neurological status  - Monitor vital signs such as temperature, blood pressure, glucose, and any other labs ordered   - Initiate measures to prevent increased intracranial pressure  - Monitor for seizure activity and implement precautions if appropriate      Outcome: Progressing     Problem: RESPIRATORY - ADULT  Goal: Achieves optimal ventilation and oxygenation  Description: INTERVENTIONS:  - Assess for changes in respiratory status  - Assess for changes in mentation and behavior  - Position to facilitate oxygenation and minimize respiratory effort  - Oxygen administered by appropriate delivery if ordered  - Initiate smoking cessation education as indicated  - Encourage broncho-pulmonary hygiene including cough, deep breathe, Incentive Spirometry  - Assess the need for suctioning and aspirate as needed  - Assess and instruct to report SOB or any respiratory difficulty  - Respiratory Therapy support as indicated  Outcome: Progressing     Problem: GENITOURINARY - ADULT  Goal: Maintains or returns to baseline urinary function  Description: INTERVENTIONS:  - Assess urinary function  - Encourage oral fluids to ensure adequate hydration if ordered  - Administer IV fluids as ordered to ensure adequate hydration  - Administer ordered medications as needed  - Offer frequent toileting  - Follow urinary retention protocol if ordered  Outcome: Progressing  Goal: Absence of urinary retention  Description: INTERVENTIONS:  - Assess patient’s ability to void and empty bladder  - Monitor I/O  - Bladder scan as needed  - Discuss with physician/AP medications to alleviate retention as needed  - Discuss catheterization for long term situations as appropriate  Outcome: Progressing     Problem: METABOLIC, FLUID AND ELECTROLYTES - ADULT  Goal: Electrolytes maintained within normal limits  Description: INTERVENTIONS:  - Monitor labs and assess  patient for signs and symptoms of electrolyte imbalances  - Administer electrolyte replacement as ordered  - Monitor response to electrolyte replacements, including repeat lab results as appropriate  - Instruct patient on fluid and nutrition as appropriate  Outcome: Progressing

## 2024-11-10 NOTE — ASSESSMENT & PLAN NOTE
Metabolic enceophalopathy poa secondary to sepsis  Evidence by confusion in setting of known dementia   Patient improving , continue steroids

## 2024-11-10 NOTE — PROGRESS NOTES
Progress Note - Hospitalist   Name: Carlos Randolph 84 y.o. male I MRN: [id number]  Unit/Bed#: -01 I Date of Admission: 11/8/2024   Date of Service: 11/10/2024 I Hospital Day: 1    Assessment & Plan  URI (upper respiratory infection)  Patient presenting with URI symptoms progressively worsened today with sore throat, fevers and chills.  Seen at urgent care PTA, referred to ED for evaluation.  Febrile on admission to 102.4, tachycardic to 123, satting 88% requiring 4 L O2 NC  Initial workup largely unrevealing; CBC/CMP unremarkable, lactate 3.0 improved to 1.2 after IV fluids, procalc wnl; mag 1.7  ECG sinus tach, rate 98. Hs trops negative. BNP wnl.  Flu/RSV/COVID-negative  Strep a PCR negative  CTA PE 11/8 negative, no acute cardiopulmonary disease; subsegmental atelectasis at the left lung base seen  Blood cultures obtained in ED, given IV ceftriaxone 1 g x 1  Presentation suggestive of URI, clinical signs and symptoms not suggestive of pneumonia in addition to normal white count, procalcitonin, and any consolidation on imaging.  Tachycardia likely due to acute illness/dehydration  Plan:   Monitor off antibiotics  Positive for parainfluenza  Continue decadron  Wean oyxgen as tolerated    Dementia (HCC)  History of dementia noted, requires 24-hour caregiving assistance at home  Previously followed by geriatrics outpatient      Coronary artery disease involving native coronary artery  History of CAD status post CABG x 3 in 1995  PVD status post SFA stent  Follows with cardiology  Last cardiac cath reviewed  EF normal on last nuclear stress test in 2021  Being managed medically with ASA/statin, antianginals  Currently euvolemic appearing  Plan: Continue home ASA/statin, Imdur, as needed nitroglycerin for angina      Sepsis (HCC)  Meeting SIRS on admission with max 1-2.4, tachycardia 123, respirations 20  Lactate elevated 3.0 on her ED arrival improved to 1.7 after IV fluid bolus.  Blood cultures obtained in ED,  "given IV ceftriaxone 1g x 1  Source likely URI driven  Plan: See assessment and plan for \"URI\"    Deep vein thrombosis (DVT) of lower extremity (HCC)  Recently diagnosed in setting of travel  Outpatient VAS duplex 10/28 showing evidence suggestive of acute occlusive deep vein thrombosis noted in a short segment of the posterior tibial vein at the proximal/mid calf.  Per caregiver, patient reportedly taking Eliquis 5mg BID as prescribed by PCP in [place]  Plan: patient tolerating oral intake will resume eliquis  Metabolic encephalopathy  Metabolic enceophalopathy poa secondary to sepsis  Evidence by confusion in setting of known dementia   Patient improving , continue steroids    VTE Pharmacologic Prophylaxis:   Moderate Risk (Score 3-4) - Pharmacological DVT Prophylaxis Ordered: apixaban (Eliquis).    Mobility:   Basic Mobility Inpatient Raw Score: 15  JH-HLM Goal: 4: Move to chair/commode  JH-HLM Achieved: 2: Bed activities/Dependent transfer  JH-HLM Goal achieved. Continue to encourage appropriate mobility.    Patient Centered Rounds: I performed bedside rounds with nursing staff today.   Discussions with Specialists or Other Care Team Provider:     Education and Discussions with Family / Patient: Updated  (daughter) at bedside.    Current Length of Stay: 1 day(s)  Current Patient Status: Inpatient   Certification Statement: The patient will continue to require additional inpatient hospital stay due to oxygen requirmements  Discharge Plan: Anticipate discharge in 48 hrs to discharge location to be determined pending rehab evaluations.    Code Status: Level 1 - Full Code    Subjective   Patient awake and alert, had most of his breakfast, denies acute complaints    Objective :  Temp:  [97.6 °F (36.4 °C)-102.2 °F (39 °C)] 97.9 °F (36.6 °C)  HR:  [51-94] 51  BP: (122-136)/(54-81) 125/54  Resp:  [16-34] 16  SpO2:  [89 %-99 %] 94 %  O2 Device: Nasal cannula  Nasal Cannula O2 Flow Rate (L/min):  [3 L/min-5 " L/min] 4 L/min    Body mass index is 27.69 kg/m².     Input and Output Summary (last 24 hours):     Intake/Output Summary (Last 24 hours) at 11/10/2024 1125  Last data filed at 11/10/2024 0547  Gross per 24 hour   Intake 250 ml   Output 350 ml   Net -100 ml       Physical Exam  Vitals and nursing note reviewed.   Constitutional:       General: He is not in acute distress.     Appearance: He is well-developed. He is not toxic-appearing or diaphoretic.   HENT:      Head: Normocephalic and atraumatic.   Eyes:      General: No scleral icterus.     Conjunctiva/sclera: Conjunctivae normal.   Cardiovascular:      Rate and Rhythm: Normal rate and regular rhythm.      Heart sounds: No murmur heard.     No friction rub. No gallop.   Pulmonary:      Effort: Pulmonary effort is normal. No respiratory distress.      Breath sounds: No stridor. Rhonchi present. No wheezing or rales.   Chest:      Chest wall: No tenderness.   Abdominal:      General: There is no distension.      Palpations: Abdomen is soft. There is no mass.      Tenderness: There is no abdominal tenderness. There is no guarding or rebound.      Hernia: No hernia is present.   Musculoskeletal:         General: No swelling or tenderness.      Cervical back: Neck supple.   Skin:     General: Skin is warm and dry.      Capillary Refill: Capillary refill takes less than 2 seconds.   Neurological:      Mental Status: He is alert.   Psychiatric:         Mood and Affect: Mood normal.           Lines/Drains:  Lines/Drains/Airways       Active Status       Name Placement date Placement time Site Days    External Urinary Catheter Small 11/08/24  2345  -- 1                            Lab Results: I have reviewed the following results:   Results from last 7 days   Lab Units 11/09/24  0452   WBC Thousand/uL 7.07   HEMOGLOBIN g/dL 13.4   HEMATOCRIT % 41.3   PLATELETS Thousands/uL 150   SEGS PCT % 83*   LYMPHO PCT % 9*   MONO PCT % 8   EOS PCT % 0     Results from last 7 days    Lab Units 11/09/24  0452 11/08/24  1424   SODIUM mmol/L 143 136   POTASSIUM mmol/L 3.5 4.7   CHLORIDE mmol/L 108 102   CO2 mmol/L 24 25   BUN mg/dL 15 20   CREATININE mg/dL 0.82 0.95   ANION GAP mmol/L 11 9   CALCIUM mg/dL 8.8 9.3   ALBUMIN g/dL  --  4.3   TOTAL BILIRUBIN mg/dL  --  0.70   ALK PHOS U/L  --  85   ALT U/L  --  30   AST U/L  --  42*   GLUCOSE RANDOM mg/dL 107 96     Results from last 7 days   Lab Units 11/08/24  1424   INR  1.35*             Results from last 7 days   Lab Units 11/08/24  1747 11/08/24  1424   LACTIC ACID mmol/L 1.2 3.0*   PROCALCITONIN ng/ml  --  0.09       Recent Cultures (last 7 days):   Results from last 7 days   Lab Units 11/10/24  0101 11/08/24  1424   BLOOD CULTURE  Received in Microbiology Lab. Culture in Progress.  Received in Microbiology Lab. Culture in Progress. No Growth at 24 hrs.   GRAM STAIN RESULT   --  Gram positive cocci in clusters*       Imaging Results Review: No pertinent imaging studies reviewed.  Other Study Results Review: No additional pertinent studies reviewed.    Last 24 Hours Medication List:     Current Facility-Administered Medications:     acetaminophen (TYLENOL) tablet 650 mg, Q6H PRN    albuterol inhalation solution 2.5 mg, Q6H PRN    apixaban (ELIQUIS) tablet 5 mg, BID    aspirin chewable tablet 81 mg, Daily    carvedilol (COREG) tablet 6.25 mg, Q12H GUNNAR    famotidine (PEPCID) tablet 40 mg, Daily    guaiFENesin (MUCINEX) 12 hr tablet 600 mg, Q12H GUNNAR    ibuprofen (MOTRIN) tablet 400 mg, Q6H PRN    isosorbide mononitrate (IMDUR) 24 hr tablet 30 mg, Daily    nitroglycerin (NITROSTAT) SL tablet 0.4 mg, Q5 Min PRN    ondansetron (ZOFRAN) injection 4 mg, Q6H PRN    polyethylene glycol (MIRALAX) packet 17 g, Daily PRN    Administrative Statements   Today, Patient Was Seen By: Omar Martinez DO      **Please Note: This note may have been constructed using a voice recognition system.**

## 2024-11-10 NOTE — PLAN OF CARE
Problem: RESPIRATORY - ADULT  Goal: Achieves optimal ventilation and oxygenation  Description: INTERVENTIONS:  - Assess for changes in respiratory status  - Assess for changes in mentation and behavior  - Position to facilitate oxygenation and minimize respiratory effort  - Oxygen administered by appropriate delivery if ordered  - Initiate smoking cessation education as indicated  - Encourage broncho-pulmonary hygiene including cough, deep breathe, Incentive Spirometry  - Assess the need for suctioning and aspirate as needed  - Assess and instruct to report SOB or any respiratory difficulty  - Respiratory Therapy support as indicated  Outcome: Progressing     Problem: GENITOURINARY - ADULT  Goal: Absence of urinary retention  Description: INTERVENTIONS:  - Assess patient’s ability to void and empty bladder  - Monitor I/O  - Bladder scan as needed  - Discuss with physician/AP medications to alleviate retention as needed  - Discuss catheterization for long term situations as appropriate  Outcome: Progressing     Problem: NEUROSENSORY - ADULT  Goal: Achieves stable or improved neurological status  Description: INTERVENTIONS  - Monitor and report changes in neurological status  - Monitor vital signs such as temperature, blood pressure, glucose, and any other labs ordered   - Initiate measures to prevent increased intracranial pressure  - Monitor for seizure activity and implement precautions if appropriate      Outcome: Not Progressing

## 2024-11-10 NOTE — ED PROVIDER NOTES
Time reflects when diagnosis was documented in both MDM as applicable and the Disposition within this note       Time User Action Codes Description Comment    11/8/2024  6:49 PM Hang Panchal Add [R41.82] Altered mental status, unspecified           ED Disposition       ED Disposition   Admit    Condition   Stable    Date/Time   Fri Nov 8, 2024  6:49 PM    Comment   Case was discussed with Dr. Martinez and the patient's admission status was agreed to be Admission Status: observation status to the service of Dr. Martinez.               Assessment & Plan       Medical Decision Making  84-year-old male presenting to the emergency department today for worsening confusion.  This is associated with a fever, nasal congestion, and new onset urinary incontinence.  Vital signs initially show tachypnea.  Patient is initially afebrile.  On physical examination, the patient is alert and oriented to person only and per caretaker he is more confused than he normally is.  He is rhonchi to bilateral lung fields.  Patient required nasal cannula while here in the emergency department secondary to oxygen desaturation.  The patient was given a breathing treatment while here in the emergency department.  EKG shows normal sinus rhythm with a rate of 98.  Left bundle branch block noted.  I reviewed prior EKG.  Chest x-ray suspicious for left-sided pneumonia on my independent interpretation.  He has no leukocytosis.  Lactic acidosis initially 3.0.  CT PE study negative for any acute pulmonary embolus.  CT head negative for any acute intracranial abnormalities.  BNP is 89.  The patient has no leukocytosis.  Negative viral panel.  Urinalysis is reassuring.  Based upon worsening mental status associated with new onset oxygen requirement, will plan for admission.  Case was discussed with Dr. Martinez who accepts the patient for admission.  The patient and/or patient's proxy verify their understanding and agree to the plan at this time.  All  "questions answered to the patient and/or their proxy's satisfaction.  All labs reviewed and utilized in the medical decision making process (if labs were ordered).  Portions of the record may have been created with voice recognition software.  Occasional wrong word or \"sound a like\" substitutions may have occurred due to the inherent limitations of voice recognition software.  Read the chart carefully and recognize, using context, where substitutions have occurred.    I reviewed prior notes.  I reviewed prior labs.  I reviewed prior imaging.    Problems Addressed:  Altered mental status, unspecified: undiagnosed new problem with uncertain prognosis    Amount and/or Complexity of Data Reviewed  External Data Reviewed: labs, radiology, ECG and notes.  Labs: ordered. Decision-making details documented in ED Course.  Radiology: ordered and independent interpretation performed. Decision-making details documented in ED Course.     Details: CXR  ECG/medicine tests: ordered and independent interpretation performed. Decision-making details documented in ED Course.  Discussion of management or test interpretation with external provider(s): Dr. Michelle Downs  Prescription drug management.  Decision regarding hospitalization.      Results Reviewed       Procedure Component Value Units Date/Time    Blood culture #2 [[id number]] Collected: 11/08/24 1424    Lab Status: Preliminary result Specimen: Blood from Arm, Right Updated: 11/09/24 1901     Blood Culture No Growth at 24 hrs.    Blood Culture Identification Panel [[id number]]  (Abnormal) Collected: 11/08/24 1424    Lab Status: Preliminary result Specimen: Blood from Arm, Left Updated: 11/09/24 1757     Staphylococcus epidermidis Detected    Narrative:      Routine culture and susceptiblity to follow for confirmation.    Film Array panel tests for 11 gram positive organisms, 15 gram negative organisms, 7 yeast species and 10 resistance genes.     Respiratory Panel " 2.1(RP2)with COVID19 [[id number]]  (Abnormal) Collected: 11/08/24 2354    Lab Status: Final result Specimen: Nasopharyngeal Swab Updated: 11/09/24 1703     Adenovirus Not Detected     Bordetella parapertussis Not Detected     Bordetella pertussis Not Detected     Chlamydia pneumoniae Not Detected     SARS-CoV-2 Not Detected     Coronavirus 229E Not Detected     Coronavirus HKU1 Not Detected     Coronavirus NL63 Not Detected     Coronavirus OC43 Not Detected     Human Metapneumovirus Not Detected     Rhino/Enterovirus Not Detected     Influenza A Not Detected     Influenza B Not Detected     Mycoplasma pneumoniae Not Detected     Parainfluenza 1 Not Detected     Parainfluenza 2 Detected     Parainfluenza 3 Not Detected     Parainfluenza 4 Not Detected     Respiratory Syncytial Virus Not Detected    Blood culture #1 [[id number]]  (Abnormal) Collected: 11/08/24 1424    Lab Status: Preliminary result Specimen: Blood from Arm, Left Updated: 11/09/24 1640     Gram Stain Result Gram positive cocci in clusters    Basic metabolic panel [[id number]]  (Abnormal) Collected: 11/09/24 0452    Lab Status: Final result Specimen: Blood from Arm, Right Updated: 11/09/24 0621     Sodium 143 mmol/L      Potassium 3.5 mmol/L      Chloride 108 mmol/L      CO2 24 mmol/L      ANION GAP 11 mmol/L      BUN 15 mg/dL      Creatinine 0.82 mg/dL      Glucose 107 mg/dL      Glucose, Fasting 107 mg/dL      Calcium 8.8 mg/dL      eGFR 81 ml/min/1.73sq m     Narrative:      National Kidney Disease Foundation guidelines for Chronic Kidney Disease (CKD):     Stage 1 with normal or high GFR (GFR > 90 mL/min/1.73 square meters)    Stage 2 Mild CKD (GFR = 60-89 mL/min/1.73 square meters)    Stage 3A Moderate CKD (GFR = 45-59 mL/min/1.73 square meters)    Stage 3B Moderate CKD (GFR = 30-44 mL/min/1.73 square meters)    Stage 4 Severe CKD (GFR = 15-29 mL/min/1.73 square meters)    Stage 5 End Stage CKD (GFR <15 mL/min/1.73 square meters)  Note: GFR  calculation is accurate only with a steady state creatinine    Magnesium [[id number]]  (Normal) Collected: 11/09/24 0452    Lab Status: Final result Specimen: Blood from Arm, Right Updated: 11/09/24 0621     Magnesium 2.2 mg/dL     CBC and differential [[id number]]  (Abnormal) Collected: 11/09/24 0452    Lab Status: Final result Specimen: Blood from Arm, Right Updated: 11/09/24 0547     WBC 7.07 Thousand/uL      RBC 4.62 Million/uL      Hemoglobin 13.4 g/dL      Hematocrit 41.3 %      MCV 89 fL      MCH 29.0 pg      MCHC 32.4 g/dL      RDW 14.5 %      MPV 10.5 fL      Platelets 150 Thousands/uL      nRBC 0 /100 WBCs      Segmented % 83 %      Immature Grans % 0 %      Lymphocytes % 9 %      Monocytes % 8 %      Eosinophils Relative 0 %      Basophils Relative 0 %      Absolute Neutrophils 5.83 Thousands/µL      Absolute Immature Grans 0.01 Thousand/uL      Absolute Lymphocytes 0.63 Thousands/µL      Absolute Monocytes 0.59 Thousand/µL      Eosinophils Absolute 0.00 Thousand/µL      Basophils Absolute 0.01 Thousands/µL     Magnesium [[id number]]  (Abnormal) Collected: 11/08/24 1747    Lab Status: Final result Specimen: Blood from Arm, Left Updated: 11/08/24 2046     Magnesium 1.7 mg/dL     Phosphorus [[id number]]  (Normal) Collected: 11/08/24 1747    Lab Status: Final result Specimen: Blood from Arm, Left Updated: 11/08/24 2046     Phosphorus 3.0 mg/dL     HS Troponin I 2hr [[id number]]  (Normal) Collected: 11/08/24 1747    Lab Status: Final result Specimen: Blood from Arm, Left Updated: 11/08/24 1816     hs TnI 2hr 9 ng/L      Delta 2hr hsTnI 0 ng/L     Lactic acid 2 Hours [[id number]]  (Normal) Collected: 11/08/24 1747    Lab Status: Final result Specimen: Blood from Arm, Left Updated: 11/08/24 1807     LACTIC ACID 1.2 mmol/L     Narrative:      Result may be elevated if tourniquet was used during collection.    UA w Reflex to Microscopic w Reflex to Culture [[id number]]  (Normal) Collected: 11/08/24 1734    Lab Status:  Final result Specimen: Urine, Clean Catch Updated: 11/08/24 1738     Color, UA Yellow     Clarity, UA Clear     Specific Gravity, UA <=1.005     pH, UA 6.0     Leukocytes, UA Negative     Nitrite, UA Negative     Protein, UA Negative mg/dl      Glucose, UA Negative mg/dl      Ketones, UA Negative mg/dl      Urobilinogen, UA 0.2 E.U./dl      Bilirubin, UA Negative     Occult Blood, UA Negative    HS Troponin 0hr (reflex protocol) [[id number]]  (Normal) Collected: 11/08/24 1424    Lab Status: Final result Specimen: Blood from Arm, Left Updated: 11/08/24 1532     hs TnI 0hr 9 ng/L     FLU/RSV/COVID - if FLU/RSV clinically relevant [[id number]]  (Normal) Collected: 11/08/24 1424    Lab Status: Final result Specimen: Nares from Nose Updated: 11/08/24 1527     SARS-CoV-2 Negative     INFLUENZA A PCR Negative     INFLUENZA B PCR Negative     RSV PCR Negative    Narrative:      This test has been performed using the CoV-2/Flu/RSV plus assay on the [employer] GeneXpert platform. This test has been validated by the  and verified by the performing laboratory.     This test is designed to amplify and detect the following: nucleocapsid (N), envelope (E), and RNA-dependent RNA polymerase (RdRP) genes of the SARS-CoV-2 genome; matrix (M), basic polymerase (PB2), and acidic protein (PA) segments of the influenza A genome; matrix (M) and non-structural protein (NS) segments of the influenza B genome, and the nucleocapsid genes of RSV A and RSV B.     Positive results are indicative of the presence of Flu A, Flu B, RSV, and/or SARS-CoV-2 RNA. Positive results for SARS-CoV-2 or suspected novel influenza should be reported to state, local, or federal health departments according to local reporting requirements.      All results should be assessed in conjunction with clinical presentation and other laboratory markers for clinical management.     FOR PEDIATRIC PATIENTS - copy/paste COVID Guidelines URL to browser:  [email or web address]       Protime-INR [[id number]]  (Abnormal) Collected: 11/08/24 1424    Lab Status: Final result Specimen: Blood from Arm, Left Updated: 11/08/24 1516     Protime 17.1 seconds      INR 1.35    Narrative:      INR Therapeutic Range    Indication                                             INR Range      Atrial Fibrillation                                               2.0-3.0  Hypercoagulable State                                    2.0.2.3  Left Ventricular Asist Device                            2.0-3.0  Mechanical Heart Valve                                  -    Aortic(with afib, MI, embolism, HF, LA enlargement,    and/or coagulopathy)                                     2.0-3.0 (2.5-3.5)     Mitral                                                             2.5-3.5  Prosthetic/Bioprosthetic Heart Valve               2.0-3.0  Venous thromboembolism (VTE: VT, PE        2.0-3.0    APTT [[id number]]  (Abnormal) Collected: 11/08/24 1424    Lab Status: Final result Specimen: Blood from Arm, Left Updated: 11/08/24 1516     PTT 38 seconds     Strep A PCR [[id number]]  (Normal) Collected: 11/08/24 1424    Lab Status: Final result Specimen: Throat Updated: 11/08/24 1515     STREP A PCR Not Detected    Comprehensive metabolic panel [[id number]]  (Abnormal) Collected: 11/08/24 1424    Lab Status: Final result Specimen: Blood from Arm, Left Updated: 11/08/24 1507     Sodium 136 mmol/L      Potassium 4.7 mmol/L      Chloride 102 mmol/L      CO2 25 mmol/L      ANION GAP 9 mmol/L      BUN 20 mg/dL      Creatinine 0.95 mg/dL      Glucose 96 mg/dL      Calcium 9.3 mg/dL      AST 42 U/L      ALT 30 U/L      Alkaline Phosphatase 85 U/L      Total Protein 7.8 g/dL      Albumin 4.3 g/dL      Total Bilirubin 0.70 mg/dL      eGFR 73 ml/min/1.73sq m     Narrative:      National Kidney Disease Foundation guidelines for Chronic Kidney Disease (CKD):     Stage 1 with  normal or high GFR (GFR > 90 mL/min/1.73 square meters)    Stage 2 Mild CKD (GFR = 60-89 mL/min/1.73 square meters)    Stage 3A Moderate CKD (GFR = 45-59 mL/min/1.73 square meters)    Stage 3B Moderate CKD (GFR = 30-44 mL/min/1.73 square meters)    Stage 4 Severe CKD (GFR = 15-29 mL/min/1.73 square meters)    Stage 5 End Stage CKD (GFR <15 mL/min/1.73 square meters)  Note: GFR calculation is accurate only with a steady state creatinine    Lactic acid [[id number]]  (Abnormal) Collected: 11/08/24 1424    Lab Status: Final result Specimen: Blood from Arm, Left Updated: 11/08/24 1507     LACTIC ACID 3.0 mmol/L     Narrative:      Result may be elevated if tourniquet was used during collection.    Procalcitonin [[id number]]  (Normal) Collected: 11/08/24 1424    Lab Status: Final result Specimen: Blood from Arm, Left Updated: 11/08/24 1505     Procalcitonin 0.09 ng/ml     B-Type Natriuretic Peptide(BNP) [[id number]]  (Normal) Collected: 11/08/24 1424    Lab Status: Final result Specimen: Blood from Arm, Left Updated: 11/08/24 1503     BNP 89 pg/mL     CBC and differential [[id number]]  (Abnormal) Collected: 11/08/24 1424    Lab Status: Final result Specimen: Blood from Arm, Left Updated: 11/08/24 1437     WBC 8.77 Thousand/uL      RBC 4.90 Million/uL      Hemoglobin 14.5 g/dL      Hematocrit 45.0 %      MCV 92 fL      MCH 29.6 pg      MCHC 32.2 g/dL      RDW 14.4 %      MPV 10.7 fL      Platelets 177 Thousands/uL      nRBC 0 /100 WBCs      Segmented % 68 %      Immature Grans % 0 %      Lymphocytes % 18 %      Monocytes % 14 %      Eosinophils Relative 0 %      Basophils Relative 0 %      Absolute Neutrophils 5.94 Thousands/µL      Absolute Immature Grans 0.02 Thousand/uL      Absolute Lymphocytes 1.60 Thousands/µL      Absolute Monocytes 1.19 Thousand/µL      Eosinophils Absolute 0.01 Thousand/µL      Basophils Absolute 0.01 Thousands/µL           CT pe study w abdomen pelvis w contrast   Final Result by Lc Wilkerson,  MD (11/08 1722)         1. No evidence of acute pulmonary embolus or thoracic aortic aneurysm. No acute cardiopulmonary process.   2. Findings compatible with constipation. No evidence of colitis, diverticulitis or appendicitis.                     Workstation performed: [id number]         CT head without contrast   Final Result by Foreign Stanton MD (11/08 1630)      No acute intracranial abnormality.      Similar mild chronic microangiopathy.            Workstation performed: [id number]         XR chest portable   Final Result by Noah Heredia MD (11/08 1540)      Right infrahilar opacity can represent pneumonia in the appropriate clinical setting.            Workstation performed: [id number]             ED Course as of 11/09/24 2131 Fri Nov 08, 2024   1539 LACTIC ACID(!): 3.0  IVF ordered.       Medications   acetaminophen (TYLENOL) tablet 650 mg (has no administration in time range)   ondansetron (ZOFRAN) injection 4 mg (has no administration in time range)   polyethylene glycol (MIRALAX) packet 17 g (has no administration in time range)   aspirin chewable tablet 81 mg (has no administration in time range)   carvedilol (COREG) tablet 6.25 mg (6.25 mg Oral Not Given 11/8/24 2048)   famotidine (PEPCID) tablet 40 mg (has no administration in time range)   isosorbide mononitrate (IMDUR) 24 hr tablet 30 mg (has no administration in time range)   nitroglycerin (NITROSTAT) SL tablet 0.4 mg (has no administration in time range)   acetaminophen (Ofirmev) injection 1,000 mg (0 mg Intravenous Stopped 11/8/24 1528)   sodium chloride 0.9 % bolus 1,000 mL (0 mL Intravenous Stopped 11/8/24 1904)   iohexol (OMNIPAQUE) 350 MG/ML injection (SINGLE-DOSE) 100 mL (100 mL Intravenous Given 11/8/24 1624)   cefTRIAXone (ROCEPHIN) IVPB (premix in dextrose) 1,000 mg 50 mL (0 mg Intravenous Stopped 11/8/24 1904)   acetaminophen (Ofirmev) injection 1,000 mg (1,000 mg Intravenous New Bag 11/8/24 2056)   magnesium sulfate 2 g/50  mL IVPB (premix) 2 g (has no administration in time range)       ED Risk Strat Scores                           SBIRT 20yo+      Flowsheet Row Most Recent Value   Initial Alcohol Screen: US AUDIT-C     1. How often do you have a drink containing alcohol? 0 Filed at: 11/08/2024 1402   2. How many drinks containing alcohol do you have on a typical day you are drinking?  0 Filed at: 11/08/2024 1402   3a. Male UNDER 65: How often do you have five or more drinks on one occasion? 0 Filed at: 11/08/2024 1402   3b. FEMALE Any Age, or MALE 65+: How often do you have 4 or more drinks on one occassion? 0 Filed at: 11/08/2024 1402   Audit-C Score 0 Filed at: 11/08/2024 1402   JOSE: How many times in the past year have you...    Used an illegal drug or used a prescription medication for non-medical reasons? Never Filed at: 11/08/2024 1402                            History of Present Illness       Chief Complaint   Patient presents with    Sore Throat     Pt presents with care taker c/o sore throat and increased confusion from baseline since yesterday       Past Medical History:   Diagnosis Date    Carotid artery disease (HCC)     Claudication in peripheral vascular disease (HCC)     High cholesterol       Past Surgical History:   Procedure Laterality Date    BYPASS GRAFT  1996    CAROTID STENT  2012    COLONOSCOPY      CORONARY ARTERY BYPASS GRAFT  1995    IR AORTAGRAM WITH RUN-OFF  7/2/2020    HI SLCTV CATHJ 3RD+ ORD SLCTV ABDL PEL/LXTR BRNCH Right 7/2/2020    Procedure: ARTERIOGRAM, RIGHT LEG ANGIOGRAM, BALLOON ANGIOPLASTY AND STENTING OF RIGHT SFA;  Surgeon: Brigitte Platt MD;  Location: [facility];  Service: Vascular    VASCULAR SURGERY        Family History   Problem Relation Age of Onset    Hypertension Family     Heart disease Family     Stroke Family     No Known Problems Mother     No Known Problems Father       Social History     Tobacco Use    Smoking status: Former     Current packs/day: 0.00     Types: Cigarettes      Quit date: 1964     Years since quittin.8    Smokeless tobacco: Never   Vaping Use    Vaping status: Never Used   Substance Use Topics    Alcohol use: Not Currently     Comment: social     Drug use: Never      E-Cigarette/Vaping    E-Cigarette Use Never User       E-Cigarette/Vaping Substances    Nicotine No     THC No     CBD No     Flavoring No     Other No     Unknown No       I have reviewed and agree with the history as documented.     This is an 84-year-old male with past medical history significant for dementia presenting to the emergency department today for altered mental status.  Per the patient's caretaker, patient has been more confused than he normally is.  He has had a cough associated with nasal congestion and rhinorrhea.  The patient's caretaker also notes he is newly incontinent but urine is not foul-smelling nor bloody.  Patient himself endorses no complaints.  He has no chest pain.  He has had no vomiting or diarrhea.  Unknown sick contacts.  He does note a sore throat.  He has allegedly had fevers at home.  He denies other complaints at this time.      History provided by:  Patient   used: No    Altered Mental Status  Presenting symptoms: behavior changes, confusion and disorientation    Presenting symptoms: no combativeness and no lethargy    Severity:  Moderate  Episode history:  Continuous  Duration:  1 day  Timing:  Constant  Progression:  Worsening  Chronicity:  New  Associated symptoms: bladder incontinence and fever    Associated symptoms: no abdominal pain, no agitation, no depression, no difficulty breathing, no eye deviation, no hallucinations, no headaches, no nausea, no palpitations, no rash, no seizures, no suicidal behavior and no vomiting        Review of Systems   Constitutional:  Positive for fatigue and fever. Negative for chills.   Respiratory:  Negative for cough, chest tightness, shortness of breath and wheezing.    Cardiovascular:  Negative for  chest pain, palpitations and leg swelling.   Gastrointestinal:  Negative for abdominal pain, constipation, diarrhea, nausea and vomiting.   Genitourinary:  Positive for bladder incontinence and difficulty urinating.   Musculoskeletal:  Negative for neck pain and neck stiffness.   Skin:  Negative for rash and wound.   Neurological:  Negative for dizziness, seizures, syncope, facial asymmetry, numbness and headaches.   Psychiatric/Behavioral:  Positive for confusion. Negative for agitation and hallucinations.    All other systems reviewed and are negative.          Objective       ED Triage Vitals   Temperature Pulse Blood Pressure Respirations SpO2 Patient Position - Orthostatic VS   11/08/24 1447 11/08/24 1401 11/08/24 1402 11/08/24 1401 11/08/24 1401 11/08/24 1401   99 °F (37.2 °C) 105 130/80 17 93 % Lying      Temp Source Heart Rate Source BP Location FiO2 (%) Pain Score    11/08/24 1447 11/08/24 1401 11/08/24 1401 -- 11/08/24 2130    Oral Monitor Left arm  No Pain      Vitals      Date and Time Temp Pulse SpO2 Resp BP Pain Score FACES Pain Rating User   11/09/24 2112 -- -- -- -- -- Med Not Given for Pain - for MAR use only -- AL   11/09/24 1556 -- -- 90 % -- -- -- -- LT   11/09/24 1534 -- -- 90 % -- -- -- -- LT   11/09/24 1512 100.7 °F (38.2 °C) 94 89 % 34 136/81 -- -- TH   11/09/24 1000 -- -- -- -- -- No Pain -- TW   11/09/24 0753 98.3 °F (36.8 °C) 89 95 % 22 132/69 -- -- NB   11/09/24 0242 99.4 °F (37.4 °C) 99 -- 20 115/73 -- -- ML   11/08/24 2142 -- 124 90 % 20 -- -- -- LAY   11/08/24 2130 99.7 °F (37.6 °C) 102 94 % 21 -- No Pain -- EK   11/08/24 2030 102.4 °F (39.1 °C) 123 88 % 20 162/69 -- -- DG   11/08/24 1930 -- 121 88 % 22 192/91 -- -- DG   11/08/24 1447 99 °F (37.2 °C) -- -- -- -- -- -- DB   11/08/24 1402 -- 104 -- -- 130/80 -- -- DB   11/08/24 1401 -- 105 93 % 17 -- -- -- DB            Physical Exam  Vitals and nursing note reviewed.   Constitutional:       General: He is not in acute distress.      Appearance: Normal appearance. He is normal weight. He is not ill-appearing, toxic-appearing or diaphoretic.   HENT:      Head: Normocephalic and atraumatic.      Nose: Nose normal. No congestion or rhinorrhea.      Mouth/Throat:      Mouth: Mucous membranes are moist.      Pharynx: No oropharyngeal exudate or posterior oropharyngeal erythema.   Eyes:      General: No scleral icterus.        Right eye: No discharge.         Left eye: No discharge.      Conjunctiva/sclera: Conjunctivae normal.   Cardiovascular:      Rate and Rhythm: Normal rate and regular rhythm.      Pulses: Normal pulses.      Heart sounds: Normal heart sounds. No murmur heard.     No friction rub. No gallop.   Pulmonary:      Effort: Pulmonary effort is normal. No respiratory distress.      Breath sounds: No stridor. Rhonchi present. No wheezing or rales.   Chest:      Chest wall: No tenderness.   Abdominal:      General: Abdomen is flat. There is no distension.      Palpations: Abdomen is soft.      Tenderness: There is no abdominal tenderness. There is no right CVA tenderness, left CVA tenderness, guarding or rebound.   Musculoskeletal:         General: Normal range of motion.      Cervical back: Normal range of motion. No rigidity.      Right lower leg: No edema.      Left lower leg: No edema.   Skin:     General: Skin is warm and dry.      Capillary Refill: Capillary refill takes less than 2 seconds.      Coloration: Skin is not jaundiced or pale.   Neurological:      General: No focal deficit present.      Mental Status: He is alert.      Comments: Alert to person but neither place nor time.  The patient moves bilateral upper and lower extremities independently, equally, and without difficulty.  Follows commands at bedside.  Per caretaker, patient is more confused than he normally is.   Psychiatric:         Mood and Affect: Mood normal.         Behavior: Behavior normal.         Results Reviewed       Procedure Component Value Units  Date/Time    Blood culture #2 [[id number]] Collected: 11/08/24 1424    Lab Status: Preliminary result Specimen: Blood from Arm, Right Updated: 11/09/24 1901     Blood Culture No Growth at 24 hrs.    Blood Culture Identification Panel [[id number]]  (Abnormal) Collected: 11/08/24 1424    Lab Status: Preliminary result Specimen: Blood from Arm, Left Updated: 11/09/24 1757     Staphylococcus epidermidis Detected    Narrative:      Routine culture and susceptiblity to follow for confirmation.    Film Array panel tests for 11 gram positive organisms, 15 gram negative organisms, 7 yeast species and 10 resistance genes.     Respiratory Panel 2.1(RP2)with COVID19 [[id number]]  (Abnormal) Collected: 11/08/24 2354    Lab Status: Final result Specimen: Nasopharyngeal Swab Updated: 11/09/24 1703     Adenovirus Not Detected     Bordetella parapertussis Not Detected     Bordetella pertussis Not Detected     Chlamydia pneumoniae Not Detected     SARS-CoV-2 Not Detected     Coronavirus 229E Not Detected     Coronavirus HKU1 Not Detected     Coronavirus NL63 Not Detected     Coronavirus OC43 Not Detected     Human Metapneumovirus Not Detected     Rhino/Enterovirus Not Detected     Influenza A Not Detected     Influenza B Not Detected     Mycoplasma pneumoniae Not Detected     Parainfluenza 1 Not Detected     Parainfluenza 2 Detected     Parainfluenza 3 Not Detected     Parainfluenza 4 Not Detected     Respiratory Syncytial Virus Not Detected    Blood culture #1 [[id number]]  (Abnormal) Collected: 11/08/24 1424    Lab Status: Preliminary result Specimen: Blood from Arm, Left Updated: 11/09/24 1640     Gram Stain Result Gram positive cocci in clusters    Basic metabolic panel [[id number]]  (Abnormal) Collected: 11/09/24 0452    Lab Status: Final result Specimen: Blood from Arm, Right Updated: 11/09/24 0621     Sodium 143 mmol/L      Potassium 3.5 mmol/L      Chloride 108 mmol/L      CO2 24 mmol/L      ANION GAP 11 mmol/L      BUN 15 mg/dL       Creatinine 0.82 mg/dL      Glucose 107 mg/dL      Glucose, Fasting 107 mg/dL      Calcium 8.8 mg/dL      eGFR 81 ml/min/1.73sq m     Narrative:      National Kid[employer] Foundation guidelines for Chronic Kidney Disease (CKD):     Stage 1 with normal or high GFR (GFR > 90 mL/min/1.73 square meters)    Stage 2 Mild CKD (GFR = 60-89 mL/min/1.73 square meters)    Stage 3A Moderate CKD (GFR = 45-59 mL/min/1.73 square meters)    Stage 3B Moderate CKD (GFR = 30-44 mL/min/1.73 square meters)    Stage 4 Severe CKD (GFR = 15-29 mL/min/1.73 square meters)    Stage 5 End Stage CKD (GFR <15 mL/min/1.73 square meters)  Note: GFR calculation is accurate only with a steady state creatinine    Magnesium [[id number]]  (Normal) Collected: 11/09/24 0452    Lab Status: Final result Specimen: Blood from Arm, Right Updated: 11/09/24 0621     Magnesium 2.2 mg/dL     CBC and differential [[id number]]  (Abnormal) Collected: 11/09/24 0452    Lab Status: Final result Specimen: Blood from Arm, Right Updated: 11/09/24 0547     WBC 7.07 Thousand/uL      RBC 4.62 Million/uL      Hemoglobin 13.4 g/dL      Hematocrit 41.3 %      MCV 89 fL      MCH 29.0 pg      MCHC 32.4 g/dL      RDW 14.5 %      MPV 10.5 fL      Platelets 150 Thousands/uL      nRBC 0 /100 WBCs      Segmented % 83 %      Immature Grans % 0 %      Lymphocytes % 9 %      Monocytes % 8 %      Eosinophils Relative 0 %      Basophils Relative 0 %      Absolute Neutrophils 5.83 Thousands/µL      Absolute Immature Grans 0.01 Thousand/uL      Absolute Lymphocytes 0.63 Thousands/µL      Absolute Monocytes 0.59 Thousand/µL      Eosinophils Absolute 0.00 Thousand/µL      Basophils Absolute 0.01 Thousands/µL     Magnesium [[id number]]  (Abnormal) Collected: 11/08/24 1747    Lab Status: Final result Specimen: Blood from Arm, Left Updated: 11/08/24 2046     Magnesium 1.7 mg/dL     Phosphorus [[id number]]  (Normal) Collected: 11/08/24 1747    Lab Status: Final result Specimen: Blood from Arm,  Left Updated: 11/08/24 2046     Phosphorus 3.0 mg/dL     HS Troponin I 2hr [[id number]]  (Normal) Collected: 11/08/24 1747    Lab Status: Final result Specimen: Blood from Arm, Left Updated: 11/08/24 1816     hs TnI 2hr 9 ng/L      Delta 2hr hsTnI 0 ng/L     Lactic acid 2 Hours [[id number]]  (Normal) Collected: 11/08/24 1747    Lab Status: Final result Specimen: Blood from Arm, Left Updated: 11/08/24 1807     LACTIC ACID 1.2 mmol/L     Narrative:      Result may be elevated if tourniquet was used during collection.    UA w Reflex to Microscopic w Reflex to Culture [[id number]]  (Normal) Collected: 11/08/24 1734    Lab Status: Final result Specimen: Urine, Clean Catch Updated: 11/08/24 1738     Color, UA Yellow     Clarity, UA Clear     Specific Gravity, UA <=1.005     pH, UA 6.0     Leukocytes, UA Negative     Nitrite, UA Negative     Protein, UA Negative mg/dl      Glucose, UA Negative mg/dl      Ketones, UA Negative mg/dl      Urobilinogen, UA 0.2 E.U./dl      Bilirubin, UA Negative     Occult Blood, UA Negative    HS Troponin 0hr (reflex protocol) [[id number]]  (Normal) Collected: 11/08/24 1424    Lab Status: Final result Specimen: Blood from Arm, Left Updated: 11/08/24 1532     hs TnI 0hr 9 ng/L     FLU/RSV/COVID - if FLU/RSV clinically relevant [[id number]]  (Normal) Collected: 11/08/24 1424    Lab Status: Final result Specimen: Nares from Nose Updated: 11/08/24 1527     SARS-CoV-2 Negative     INFLUENZA A PCR Negative     INFLUENZA B PCR Negative     RSV PCR Negative    Narrative:      This test has been performed using the CoV-2/Flu/RSV plus assay on the [facility] GeneXpert platform. This test has been validated by the  and verified by the performing laboratory.     This test is designed to amplify and detect the following: nucleocapsid (N), envelope (E), and RNA-dependent RNA polymerase (RdRP) genes of the SARS-CoV-2 genome; matrix (M), basic polymerase (PB2), and acidic protein (PA) segments of the  influenza A genome; matrix (M) and non-structural protein (NS) segments of the influenza B genome, and the nucleocapsid genes of RSV A and RSV B.     Positive results are indicative of the presence of Flu A, Flu B, RSV, and/or SARS-CoV-2 RNA. Positive results for SARS-CoV-2 or suspected novel influenza should be reported to state, local, or federal health departments according to local reporting requirements.      All results should be assessed in conjunction with clinical presentation and other laboratory markers for clinical management.     FOR PEDIATRIC PATIENTS - copy/paste COVID Guidelines URL to browser: [email or web address]       Protime-INR [[id number]]  (Abnormal) Collected: 11/08/24 1424    Lab Status: Final result Specimen: Blood from Arm, Left Updated: 11/08/24 1516     Protime 17.1 seconds      INR 1.35    Narrative:      INR Therapeutic Range    Indication                                             INR Range      Atrial Fibrillation                                               2.0-3.0  Hypercoagulable State                                    2.0.2.3  Left Ventricular Asist Device                            2.0-3.0  Mechanical Heart Valve                                  -    Aortic(with afib, MI, embolism, HF, LA enlargement,    and/or coagulopathy)                                     2.0-3.0 (2.5-3.5)     Mitral                                                             2.5-3.5  Prosthetic/Bioprosthetic Heart Valve               2.0-3.0  Venous thromboembolism (VTE: VT, PE        2.0-3.0    APTT [[id number]]  (Abnormal) Collected: 11/08/24 1424    Lab Status: Final result Specimen: Blood from Arm, Left Updated: 11/08/24 1516     PTT 38 seconds     Strep A PCR [[id number]]  (Normal) Collected: 11/08/24 1424    Lab Status: Final result Specimen: Throat Updated: 11/08/24 1515     STREP A PCR Not Detected    Comprehensive metabolic panel [[id number]]   (Abnormal) Collected: 11/08/24 1424    Lab Status: Final result Specimen: Blood from Arm, Left Updated: 11/08/24 1507     Sodium 136 mmol/L      Potassium 4.7 mmol/L      Chloride 102 mmol/L      CO2 25 mmol/L      ANION GAP 9 mmol/L      BUN 20 mg/dL      Creatinine 0.95 mg/dL      Glucose 96 mg/dL      Calcium 9.3 mg/dL      AST 42 U/L      ALT 30 U/L      Alkaline Phosphatase 85 U/L      Total Protein 7.8 g/dL      Albumin 4.3 g/dL      Total Bilirubin 0.70 mg/dL      eGFR 73 ml/min/1.73sq m     Narrative:      National Kidney Disease Foundation guidelines for Chronic Kidney Disease (CKD):     Stage 1 with normal or high GFR (GFR > 90 mL/min/1.73 square meters)    Stage 2 Mild CKD (GFR = 60-89 mL/min/1.73 square meters)    Stage 3A Moderate CKD (GFR = 45-59 mL/min/1.73 square meters)    Stage 3B Moderate CKD (GFR = 30-44 mL/min/1.73 square meters)    Stage 4 Severe CKD (GFR = 15-29 mL/min/1.73 square meters)    Stage 5 End Stage CKD (GFR <15 mL/min/1.73 square meters)  Note: GFR calculation is accurate only with a steady state creatinine    Lactic acid [[id number]]  (Abnormal) Collected: 11/08/24 1424    Lab Status: Final result Specimen: Blood from Arm, Left Updated: 11/08/24 1507     LACTIC ACID 3.0 mmol/L     Narrative:      Result may be elevated if tourniquet was used during collection.    Procalcitonin [[id number]]  (Normal) Collected: 11/08/24 1424    Lab Status: Final result Specimen: Blood from Arm, Left Updated: 11/08/24 1505     Procalcitonin 0.09 ng/ml     B-Type Natriuretic Peptide(BNP) [[id number]]  (Normal) Collected: 11/08/24 1424    Lab Status: Final result Specimen: Blood from Arm, Left Updated: 11/08/24 1503     BNP 89 pg/mL     CBC and differential [[id number]]  (Abnormal) Collected: 11/08/24 1424    Lab Status: Final result Specimen: Blood from Arm, Left Updated: 11/08/24 1437     WBC 8.77 Thousand/uL      RBC 4.90 Million/uL      Hemoglobin 14.5 g/dL      Hematocrit 45.0 %      MCV 92 fL       MCH 29.6 pg      MCHC 32.2 g/dL      RDW 14.4 %      MPV 10.7 fL      Platelets 177 Thousands/uL      nRBC 0 /100 WBCs      Segmented % 68 %      Immature Grans % 0 %      Lymphocytes % 18 %      Monocytes % 14 %      Eosinophils Relative 0 %      Basophils Relative 0 %      Absolute Neutrophils 5.94 Thousands/µL      Absolute Immature Grans 0.02 Thousand/uL      Absolute Lymphocytes 1.60 Thousands/µL      Absolute Monocytes 1.19 Thousand/µL      Eosinophils Absolute 0.01 Thousand/µL      Basophils Absolute 0.01 Thousands/µL             CT pe study w abdomen pelvis w contrast   Final Interpretation by Lc Wilkerson MD (11/08 1722)         1. No evidence of acute pulmonary embolus or thoracic aortic aneurysm. No acute cardiopulmonary process.   2. Findings compatible with constipation. No evidence of colitis, diverticulitis or appendicitis.                     Workstation performed: [id number]         CT head without contrast   Final Interpretation by Foreign Stanton MD (11/08 1630)      No acute intracranial abnormality.      Similar mild chronic microangiopathy.            Workstation performed: [id number]         XR chest portable   Final Interpretation by Noah Heredia MD (11/08 9800)      Right infrahilar opacity can represent pneumonia in the appropriate clinical setting.            Workstation performed: [id number]             ECG 12 Lead Documentation Only    Date/Time: 11/9/2024 3:03 PM    Performed by: Hang Panchal PA-C  Authorized by: Hang Panchal PA-C    Indications / Diagnosis:  Altered Mental Status  Patient location:  ED  Previous ECG:     Previous ECG:  Compared to current    Comparison ECG info:  10/27/2024    Similarity:  No change  Interpretation:     Interpretation: non-specific    Rate:     ECG rate:  98    ECG rate assessment: normal    Rhythm:     Rhythm: sinus rhythm    Ectopy:     Ectopy: none    QRS:     QRS axis:  Left  Conduction:      Conduction: normal    ST segments:     ST segments:  Normal  T waves:     T waves: normal    Comments:      Normal sinus rhythm with a rate of 98.  Left axis deviation.  No ectopy.  No STEMI.  LBBB.      ED Medication and Procedure Management   Prior to Admission Medications   Prescriptions Last Dose Informant Patient Reported? Taking?   Apoaequorin (PREVAGEN PO) Unknown Care Giver Yes No   Sig: Take by mouth   FOLBIC 2.5-25-2 MG 11/8/2024 Care Giver Yes Yes   Sig: Take 1 tablet by mouth daily   Vitamin E 500 UNIT/GM POWD 11/8/2024 Care Giver Yes Yes   Sig: Use   aspirin 81 mg chewable tablet 11/8/2024 Care Giver No Yes   Sig: Chew 1 tablet (81 mg total) daily   atorvastatin (LIPITOR) 40 mg tablet 11/7/2024  No Yes   Sig: Take 1 tablet (40 mg total) by mouth every evening   carvedilol (COREG) 6.25 mg tablet 11/8/2024 Care Giver No Yes   Sig: Take 1 tablet (6.25 mg total) by mouth every 12 (twelve) hours   famotidine (PEPCID) 40 MG tablet  Self No No   Sig: Take 1 tablet (40 mg total) by mouth daily   isosorbide mononitrate (IMDUR) 30 mg 24 hr tablet 11/8/2024 Care Giver No Yes   Sig: Take 1 tablet by mouth once daily   niacin (NIASPAN) 1000 MG CR tablet Unknown Care Giver Yes No   Sig: daily   nitroglycerin (NITROSTAT) 0.4 mg SL tablet Unknown Care Giver No No   Sig: Place 1 tablet (0.4 mg total) under the tongue every 5 (five) minutes as needed for chest pain   omega-3-acid ethyl esters (LOVAZA) 1 g capsule 11/8/2024 Care Giver Yes Yes   Sig: Take 2 g by mouth 2 (two) times a day   omeprazole (PriLOSEC) 40 MG capsule 11/8/2024  Yes Yes   Sig: Take 40 mg by mouth daily   pantoprazole (PROTONIX) 20 mg tablet 11/8/2024 Care Giver Yes Yes   Sig: Take 20 mg by mouth daily      Facility-Administered Medications: None     Current Discharge Medication List        CONTINUE these medications which have NOT CHANGED    Details   aspirin 81 mg chewable tablet Chew 1 tablet (81 mg total) daily  Qty: 30 tablet, Refills: 11     Associated Diagnoses: CAD S/P percutaneous coronary angioplasty      atorvastatin (LIPITOR) 40 mg tablet Take 1 tablet (40 mg total) by mouth every evening  Qty: 90 tablet, Refills: 2    Associated Diagnoses: CAD S/P percutaneous coronary angioplasty      carvedilol (COREG) 6.25 mg tablet Take 1 tablet (6.25 mg total) by mouth every 12 (twelve) hours  Qty: 60 tablet, Refills: 4    Associated Diagnoses: CAD S/P percutaneous coronary angioplasty      FOLBIC 2.5-25-2 MG Take 1 tablet by mouth daily  Refills: 4      isosorbide mononitrate (IMDUR) 30 mg 24 hr tablet Take 1 tablet by mouth once daily  Qty: 30 tablet, Refills: 0    Associated Diagnoses: 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)      omega-3-acid ethyl esters (LOVAZA) 1 g capsule Take 2 g by mouth 2 (two) times a day      omeprazole (PriLOSEC) 40 MG capsule Take 40 mg by mouth daily      pantoprazole (PROTONIX) 20 mg tablet Take 20 mg by mouth daily      Vitamin E 500 UNIT/GM POWD Use      Apoaequorin (PREVAGEN PO) Take by mouth      famotidine (PEPCID) 40 MG tablet Take 1 tablet (40 mg total) by mouth daily  Qty: 90 tablet, Refills: 1    Associated Diagnoses: Gastroesophageal reflux disease without esophagitis      niacin (NIASPAN) 1000 MG CR tablet daily  Refills: 3      nitroglycerin (NITROSTAT) 0.4 mg SL tablet Place 1 tablet (0.4 mg total) under the tongue every 5 (five) minutes as needed for chest pain  Qty: 25 tablet, Refills: 11    Associated Diagnoses: Coronary artery disease of native artery of native heart with stable angina pectoris (HCC)           No discharge procedures on file.  ED SEPSIS DOCUMENTATION   Time reflects when diagnosis was documented in both MDM as applicable and the Disposition within this note       Time User Action Codes Description Comment    11/8/2024  6:49 PM Hang Panchal Add [R41.82] Altered mental status, unspecified                  Hang Graham  FRANCES Pancahl  11/09/24 1676

## 2024-11-10 NOTE — ASSESSMENT & PLAN NOTE
History of dementia noted, requires 24-hour caregiving assistance at home  Previously followed by geriatrics outpatient

## 2024-11-11 LAB
ANION GAP SERPL CALCULATED.3IONS-SCNC: 8 MMOL/L (ref 4–13)
ATRIAL RATE: 98 BPM
BACTERIA BLD CULT: ABNORMAL
BASOPHILS # BLD MANUAL: 0 THOUSAND/UL (ref 0–0.1)
BASOPHILS NFR MAR MANUAL: 0 % (ref 0–1)
BUN SERPL-MCNC: 23 MG/DL (ref 5–25)
CALCIUM SERPL-MCNC: 9.1 MG/DL (ref 8.4–10.2)
CHLORIDE SERPL-SCNC: 106 MMOL/L (ref 96–108)
CO2 SERPL-SCNC: 25 MMOL/L (ref 21–32)
CREAT SERPL-MCNC: 0.69 MG/DL (ref 0.6–1.3)
DOHLE BOD BLD QL SMEAR: PRESENT
EOSINOPHIL # BLD MANUAL: 0 THOUSAND/UL (ref 0–0.4)
EOSINOPHIL NFR BLD MANUAL: 0 % (ref 0–6)
ERYTHROCYTE [DISTWIDTH] IN BLOOD BY AUTOMATED COUNT: 14.6 % (ref 11.6–15.1)
GFR SERPL CREATININE-BSD FRML MDRD: 87 ML/MIN/1.73SQ M
GLUCOSE SERPL-MCNC: 172 MG/DL (ref 65–140)
GRAM STN SPEC: ABNORMAL
HCT VFR BLD AUTO: 37.3 % (ref 36.5–49.3)
HGB BLD-MCNC: 12.1 G/DL (ref 12–17)
LYMPHOCYTES # BLD AUTO: 0.58 THOUSAND/UL (ref 0.6–4.47)
LYMPHOCYTES # BLD AUTO: 7 % (ref 14–44)
MCH RBC QN AUTO: 29.7 PG (ref 26.8–34.3)
MCHC RBC AUTO-ENTMCNC: 32.4 G/DL (ref 31.4–37.4)
MCV RBC AUTO: 91 FL (ref 82–98)
MONOCYTES # BLD AUTO: 0.49 THOUSAND/UL (ref 0–1.22)
MONOCYTES NFR BLD: 6 % (ref 4–12)
NEUTROPHILS # BLD MANUAL: 7.17 THOUSAND/UL (ref 1.85–7.62)
NEUTS BAND NFR BLD MANUAL: 15 % (ref 0–8)
NEUTS SEG NFR BLD AUTO: 72 % (ref 43–75)
P AXIS: 61 DEGREES
PLATELET # BLD AUTO: 157 THOUSANDS/UL (ref 149–390)
PLATELET BLD QL SMEAR: ADEQUATE
PMV BLD AUTO: 11.3 FL (ref 8.9–12.7)
POTASSIUM SERPL-SCNC: 4.1 MMOL/L (ref 3.5–5.3)
PR INTERVAL: 160 MS
QRS AXIS: -44 DEGREES
QRSD INTERVAL: 128 MS
QT INTERVAL: 372 MS
QTC INTERVAL: 474 MS
RBC # BLD AUTO: 4.08 MILLION/UL (ref 3.88–5.62)
RBC MORPH BLD: NORMAL
S EPIDERMIDIS DNA BLD POS QL NAA+NON-PRB: DETECTED
SODIUM SERPL-SCNC: 139 MMOL/L (ref 135–147)
T WAVE AXIS: 77 DEGREES
VENTRICULAR RATE: 98 BPM
WBC # BLD AUTO: 8.24 THOUSAND/UL (ref 4.31–10.16)

## 2024-11-11 PROCEDURE — 85027 COMPLETE CBC AUTOMATED: CPT | Performed by: INTERNAL MEDICINE

## 2024-11-11 PROCEDURE — 80048 BASIC METABOLIC PNL TOTAL CA: CPT | Performed by: INTERNAL MEDICINE

## 2024-11-11 PROCEDURE — 92610 EVALUATE SWALLOWING FUNCTION: CPT

## 2024-11-11 PROCEDURE — 99232 SBSQ HOSP IP/OBS MODERATE 35: CPT | Performed by: INTERNAL MEDICINE

## 2024-11-11 PROCEDURE — 94761 N-INVAS EAR/PLS OXIMETRY MLT: CPT

## 2024-11-11 PROCEDURE — 93010 ELECTROCARDIOGRAM REPORT: CPT | Performed by: INTERNAL MEDICINE

## 2024-11-11 PROCEDURE — 85007 BL SMEAR W/DIFF WBC COUNT: CPT | Performed by: INTERNAL MEDICINE

## 2024-11-11 RX ORDER — DEXAMETHASONE SODIUM PHOSPHATE 10 MG/ML
6 INJECTION, SOLUTION INTRAMUSCULAR; INTRAVENOUS ONCE
Status: COMPLETED | OUTPATIENT
Start: 2024-11-11 | End: 2024-11-11

## 2024-11-11 RX ADMIN — GUAIFENESIN 600 MG: 600 TABLET ORAL at 09:48

## 2024-11-11 RX ADMIN — GUAIFENESIN 600 MG: 600 TABLET ORAL at 20:36

## 2024-11-11 RX ADMIN — DEXAMETHASONE SODIUM PHOSPHATE 6 MG: 10 INJECTION, SOLUTION INTRAMUSCULAR; INTRAVENOUS at 09:47

## 2024-11-11 RX ADMIN — CARVEDILOL 6.25 MG: 6.25 TABLET, FILM COATED ORAL at 09:47

## 2024-11-11 RX ADMIN — ISOSORBIDE MONONITRATE 30 MG: 30 TABLET, EXTENDED RELEASE ORAL at 09:48

## 2024-11-11 RX ADMIN — ASPIRIN 81 MG 81 MG: 81 TABLET ORAL at 09:48

## 2024-11-11 RX ADMIN — APIXABAN 5 MG: 5 TABLET, FILM COATED ORAL at 09:53

## 2024-11-11 RX ADMIN — FAMOTIDINE 40 MG: 20 TABLET, FILM COATED ORAL at 09:48

## 2024-11-11 RX ADMIN — APIXABAN 5 MG: 5 TABLET, FILM COATED ORAL at 17:34

## 2024-11-11 RX ADMIN — CARVEDILOL 6.25 MG: 6.25 TABLET, FILM COATED ORAL at 20:36

## 2024-11-11 NOTE — PLAN OF CARE
Problem: Prexisting or High Potential for Compromised Skin Integrity  Goal: Skin integrity is maintained or improved  Description: INTERVENTIONS:  - Identify patients at risk for skin breakdown  - Assess and monitor skin integrity  - Assess and monitor nutrition and hydration status  - Monitor labs   - Assess for incontinence   - Turn and reposition patient  - Assist with mobility/ambulation  - Relieve pressure over bony prominences  - Avoid friction and shearing  - Provide appropriate hygiene as needed including keeping skin clean and dry  - Evaluate need for skin moisturizer/barrier cream  - Collaborate with interdisciplinary team   - Patient/family teaching  - Consider wound care consult   11/11/2024 1541 by Melody Gonzalez RN  Outcome: Progressing  11/11/2024 1541 by Melody Gonzalez RN  Outcome: Progressing

## 2024-11-11 NOTE — RESPIRATORY THERAPY NOTE
Home Oxygen Qualifying Test     Patient name: Carlos Randolph        : 1940   Date of Test:  2024  Diagnosis:    Home Oxygen Test:    **Medicare Guidelines require item(s) 1-5 on all ambulatory patients or 1 and 2 on non-ambulatory patients.    1. Baseline SPO2 on Room Air at rest 93 %   If <= 88% on Room Air add O2 via NC to obtain SpO2 >=88%. If LPM needed, document LPM  needed to reach =>88%    SPO2 during exertion on Room Air 91  %  During exertion monitor SPO2. If SPO2 increases >=89%, do not add supplemental oxygen    SPO2 on Oxygen at Rest  NA% at  NA LPM    SPO2 during exertion on Oxygen NA % at NA LPM    Test performed during exertion activity.  Ambulation     []  Supplemental Home Oxygen is indicated.    [x]  Client does not qualify for home oxygen.    Respiratory Additional Notes-     Patient was seen today for a Home Oxygen Qualifying Test.  Patient was received on nasal cannula at 1L and titrated to room air, prior to starting testing.  Room air saturation were maintained 93%.  Testing was completed in room due to isolation status and patient was able to ambulate a distance of 204 feet unassisted.  Gait was unsteady at first and improved as walk progressed.  No S/S of SOB noted, no pain or discomfort stated by patient.  Lowest oxygen saturation noted during ambulation was 91%.  Patient did not take any pauses during testing and was returned to his bed after testing.    Patient does not qualify for supplemental oxygen at this time.      Bay Harman, RT

## 2024-11-11 NOTE — PLAN OF CARE
Problem: Prexisting or High Potential for Compromised Skin Integrity  Goal: Skin integrity is maintained or improved  Description: INTERVENTIONS:  - Identify patients at risk for skin breakdown  - Assess and monitor skin integrity  - Assess and monitor nutrition and hydration status  - Monitor labs   - Assess for incontinence   - Turn and reposition patient  - Assist with mobility/ambulation  - Relieve pressure over bony prominences  - Avoid friction and shearing  - Provide appropriate hygiene as needed including keeping skin clean and dry  - Evaluate need for skin moisturizer/barrier cream  - Collaborate with interdisciplinary team   - Patient/family teaching  - Consider wound care consult   Outcome: Progressing     Problem: PAIN - ADULT  Goal: Verbalizes/displays adequate comfort level or baseline comfort level  Description: Interventions:  - Encourage patient to monitor pain and request assistance  - Assess pain using appropriate pain scale  - Administer analgesics based on type and severity of pain and evaluate response  - Implement non-pharmacological measures as appropriate and evaluate response  - Consider cultural and social influences on pain and pain management  - Notify physician/advanced practitioner if interventions unsuccessful or patient reports new pain  Outcome: Progressing     Problem: INFECTION - ADULT  Goal: Absence or prevention of progression during hospitalization  Description: INTERVENTIONS:  - Assess and monitor for signs and symptoms of infection  - Monitor lab/diagnostic results  - Monitor all insertion sites, i.e. indwelling lines, tubes, and drains  - Monitor endotracheal if appropriate and nasal secretions for changes in amount and color  - [place] appropriate cooling/warming therapies per order  - Administer medications as ordered  - Instruct and encourage patient and family to use good hand hygiene technique  - Identify and instruct in appropriate isolation precautions for  identified infection/condition  Outcome: Progressing  Goal: Absence of fever/infection during neutropenic period  Description: INTERVENTIONS:  - Monitor WBC    Outcome: Progressing     Problem: SAFETY ADULT  Goal: Patient will remain free of falls  Description: INTERVENTIONS:  - Educate patient/family on patient safety including physical limitations  - Instruct patient to call for assistance with activity   - Consult OT/PT to assist with strengthening/mobility   - Keep Call bell within reach  - Keep bed low and locked with side rails adjusted as appropriate  - Keep care items and personal belongings within reach  - Initiate and maintain comfort rounds  - Make Fall Risk Sign visible to staff  - Offer Toileting every 2 Hours, in advance of need  - Initiate/Maintain bed alarm  - Obtain necessary fall risk management equipment: yellow socks   - Apply yellow socks and bracelet for high fall risk patients  - Consider moving patient to room near nurses station  Outcome: Progressing     Problem: RESPIRATORY - ADULT  Goal: Achieves optimal ventilation and oxygenation  Description: INTERVENTIONS:  - Assess for changes in respiratory status  - Assess for changes in mentation and behavior  - Position to facilitate oxygenation and minimize respiratory effort  - Oxygen administered by appropriate delivery if ordered  - Initiate smoking cessation education as indicated  - Encourage broncho-pulmonary hygiene including cough, deep breathe, Incentive Spirometry  - Assess the need for suctioning and aspirate as needed  - Assess and instruct to report SOB or any respiratory difficulty  - Respiratory Therapy support as indicated  Outcome: Progressing

## 2024-11-11 NOTE — ASSESSMENT & PLAN NOTE
Patient presenting with URI symptoms progressively worsened today with sore throat, fevers and chills.  Seen at urgent care PTA, referred to ED for evaluation.  Febrile on admission to 102.4, tachycardic to 123, satting 88% requiring 4 L O2 NC  Initial workup largely unrevealing; CBC/CMP unremarkable, lactate 3.0 improved to 1.2 after IV fluids, procalc wnl; mag 1.7  ECG sinus tach, rate 98. Hs trops negative. BNP wnl.  Flu/RSV/COVID-negative  Strep a PCR negative  CTA PE 11/8 negative, no acute cardiopulmonary disease; subsegmental atelectasis at the left lung base seen  Blood cultures obtained in ED, given IV ceftriaxone 1 g x 1  Presentation suggestive of URI, clinical signs and symptoms not suggestive of pneumonia in addition to normal white count, procalcitonin, and any consolidation on imaging.  Tachycardia likely due to acute illness/dehydration  Plan:   Monitor off antibiotics  Positive for parainfluenza  Continue decadron  Wean oyxgen as tolerated  Home oxygen eval in anticipation of dc in next 24 hours

## 2024-11-11 NOTE — PROGRESS NOTES
Progress Note - Hospitalist   Name: Carlos Randolph 84 y.o. male I MRN: [id number]  Unit/Bed#: -01 I Date of Admission: 11/8/2024   Date of Service: 11/11/2024 I Hospital Day: 2    Assessment & Plan  URI (upper respiratory infection)  Patient presenting with URI symptoms progressively worsened today with sore throat, fevers and chills.  Seen at urgent care PTA, referred to ED for evaluation.  Febrile on admission to 102.4, tachycardic to 123, satting 88% requiring 4 L O2 NC  Initial workup largely unrevealing; CBC/CMP unremarkable, lactate 3.0 improved to 1.2 after IV fluids, procalc wnl; mag 1.7  ECG sinus tach, rate 98. Hs trops negative. BNP wnl.  Flu/RSV/COVID-negative  Strep a PCR negative  CTA PE 11/8 negative, no acute cardiopulmonary disease; subsegmental atelectasis at the left lung base seen  Blood cultures obtained in ED, given IV ceftriaxone 1 g x 1  Presentation suggestive of URI, clinical signs and symptoms not suggestive of pneumonia in addition to normal white count, procalcitonin, and any consolidation on imaging.  Tachycardia likely due to acute illness/dehydration  Plan:   Monitor off antibiotics  Positive for parainfluenza  Continue decadron  Wean oyxgen as tolerated  Home oxygen eval in anticipation of dc in next 24 hours    Dementia (HCC)  History of dementia noted, requires 24-hour caregiving assistance at home  Previously followed by geriatrics outpatient      Coronary artery disease involving native coronary artery  History of CAD status post CABG x 3 in 1995  PVD status post SFA stent  Follows with cardiology  Last cardiac cath reviewed  EF normal on last nuclear stress test in 2021  Being managed medically with ASA/statin, antianginals  Currently euvolemic appearing  Plan: Continue home ASA/statin, Imdur, as needed nitroglycerin for angina      Sepsis (HCC)  Meeting SIRS on admission with max 1-2.4, tachycardia 123, respirations 20  Lactate elevated 3.0 on her ED arrival improved to  "1.7 after IV fluid bolus.  Blood cultures obtained in ED, given IV ceftriaxone 1g x 1  Source likely URI driven  Plan: See assessment and plan for \"URI\"    Deep vein thrombosis (DVT) of lower extremity (HCC)  Recently diagnosed in setting of travel  Outpatient VAS duplex 10/28 showing evidence suggestive of acute occlusive deep vein thrombosis noted in a short segment of the posterior tibial vein at the proximal/mid calf.  Per caregiver, patient reportedly taking Eliquis 5mg BID as prescribed by PCP in [place]  Plan: patient tolerating oral intake will resume eliquis  Metabolic encephalopathy  Metabolic enceophalopathy poa secondary to sepsis  Evidence by confusion in setting of known dementia   Patient improving , continue steroids    VTE Pharmacologic Prophylaxis:   High Risk (Score >/= 5) - Pharmacological DVT Prophylaxis Ordered: apixaban (Eliquis). Sequential Compression Devices Ordered.    Mobility:   Basic Mobility Inpatient Raw Score: 15  JH-HLM Goal: 4: Move to chair/commode  JH-HLM Achieved: 6: Walk 10 steps or more  JH-HLM Goal achieved. Continue to encourage appropriate mobility.    Patient Centered Rounds: I performed bedside rounds with nursing staff today.   Discussions with Specialists or Other Care Team Provider:     Education and Discussions with Family / Patient: Updated  (daughter) via phone.    Current Length of Stay: 2 day(s)  Current Patient Status: Inpatient   Certification Statement: The patient will continue to require additional inpatient hospital stay due to home oxygen eval  Discharge Plan: Anticipate discharge tomorrow to home with home services.    Code Status: Level 1 - Full Code    Subjective   Patient denies any acute complaints    Objective :  Temp:  [97.9 °F (36.6 °C)-98.2 °F (36.8 °C)] 98.2 °F (36.8 °C)  HR:  [57-74] 57  BP: ()/(53-70) 143/69  Resp:  [15-16] 16  SpO2:  [88 %-97 %] 97 %  O2 Device: Nasal cannula  Nasal Cannula O2 Flow Rate (L/min):  [2 L/min] 2 " L/min    Body mass index is 27.34 kg/m².     Input and Output Summary (last 24 hours):     Intake/Output Summary (Last 24 hours) at 11/11/2024 1240  Last data filed at 11/11/2024 1126  Gross per 24 hour   Intake --   Output 1000 ml   Net -1000 ml       Physical Exam  Vitals and nursing note reviewed.   Constitutional:       General: He is not in acute distress.     Appearance: He is well-developed. He is not toxic-appearing or diaphoretic.   HENT:      Head: Normocephalic and atraumatic.   Eyes:      General: No scleral icterus.     Conjunctiva/sclera: Conjunctivae normal.   Cardiovascular:      Rate and Rhythm: Normal rate and regular rhythm.      Heart sounds: No murmur heard.     No friction rub. No gallop.   Pulmonary:      Effort: Pulmonary effort is normal. No respiratory distress.      Breath sounds: No stridor. Rhonchi present. No wheezing or rales.   Chest:      Chest wall: No tenderness.   Abdominal:      General: There is no distension.      Palpations: Abdomen is soft. There is no mass.      Tenderness: There is no abdominal tenderness. There is no guarding or rebound.      Hernia: No hernia is present.   Musculoskeletal:         General: No swelling or tenderness.      Cervical back: Neck supple.   Skin:     General: Skin is warm and dry.      Capillary Refill: Capillary refill takes less than 2 seconds.   Neurological:      Mental Status: He is alert. He is disoriented.   Psychiatric:         Mood and Affect: Mood normal.           Lines/Drains:  Lines/Drains/Airways       Active Status       Name Placement date Placement time Site Days    External Urinary Catheter Small 11/08/24  2265  -- 2                            Lab Results: I have reviewed the following results:   Results from last 7 days   Lab Units 11/10/24  1206   WBC Thousand/uL 7.48   HEMOGLOBIN g/dL 11.7*   HEMATOCRIT % 37.3   PLATELETS Thousands/uL 130*   SEGS PCT % 88*   LYMPHO PCT % 8*   MONO PCT % 4   EOS PCT % 0     Results from last  7 days   Lab Units 11/10/24  1206   SODIUM mmol/L 137   POTASSIUM mmol/L 4.2   CHLORIDE mmol/L 107   CO2 mmol/L 22   BUN mg/dL 20   CREATININE mg/dL 0.74   ANION GAP mmol/L 8   CALCIUM mg/dL 8.8   ALBUMIN g/dL 3.4*   TOTAL BILIRUBIN mg/dL 0.88   ALK PHOS U/L 52   ALT U/L 40   AST U/L 56*   GLUCOSE RANDOM mg/dL 166*     Results from last 7 days   Lab Units 11/08/24  1424   INR  1.35*             Results from last 7 days   Lab Units 11/08/24  1747 11/08/24  1424   LACTIC ACID mmol/L 1.2 3.0*   PROCALCITONIN ng/ml  --  0.09       Recent Cultures (last 7 days):   Results from last 7 days   Lab Units 11/09/24  1655 11/08/24  1424   BLOOD CULTURE  No Growth at 24 hrs.  No Growth at 24 hrs. No Growth at 48 hrs.  Staphylococcus epidermidis*   GRAM STAIN RESULT   --  Gram positive cocci in clusters*       Imaging Results Review: No pertinent imaging studies reviewed.  Other Study Results Review: No additional pertinent studies reviewed.    Last 24 Hours Medication List:     Current Facility-Administered Medications:     acetaminophen (TYLENOL) tablet 650 mg, Q6H PRN    albuterol inhalation solution 2.5 mg, Q6H PRN    apixaban (ELIQUIS) tablet 5 mg, BID    aspirin chewable tablet 81 mg, Daily    carvedilol (COREG) tablet 6.25 mg, Q12H GUNNAR    famotidine (PEPCID) tablet 40 mg, Daily    guaiFENesin (MUCINEX) 12 hr tablet 600 mg, Q12H GUNNAR    isosorbide mononitrate (IMDUR) 24 hr tablet 30 mg, Daily    nitroglycerin (NITROSTAT) SL tablet 0.4 mg, Q5 Min PRN    ondansetron (ZOFRAN) injection 4 mg, Q6H PRN    polyethylene glycol (MIRALAX) packet 17 g, Daily PRN    Administrative Statements   Today, Patient Was Seen By: Omar Martinez DO      **Please Note: This note may have been constructed using a voice recognition system.**

## 2024-11-11 NOTE — SPEECH THERAPY NOTE
Speech Language/Pathology  Speech/Language Pathology  Assessment    Patient Name: Carlos Randolph  Today's Date: 11/11/2024     Problem List  Principal Problem:    URI (upper respiratory infection)  Active Problems:    Dementia (HCC)    Coronary artery disease involving native coronary artery    Sepsis (HCC)    Deep vein thrombosis (DVT) of lower extremity (HCC)    Metabolic encephalopathy    Past Medical History  Past Medical History:   Diagnosis Date    Carotid artery disease (HCC)     Claudication in peripheral vascular disease (HCC)     High cholesterol      Past Surgical History  Past Surgical History:   Procedure Laterality Date    BYPASS GRAFT  1996    CAROTID STENT  2012    COLONOSCOPY      CORONARY ARTERY BYPASS GRAFT  1995    IR AORTAGRAM WITH RUN-OFF  7/2/2020    [place] SLCTV CATHJ 3RD+ ORD SLCTV ABDL PEL/LXTR BRNCH Right 7/2/2020    Procedure: ARTERIOGRAM, RIGHT LEG ANGIOGRAM, BALLOON ANGIOPLASTY AND STENTING OF RIGHT SFA;  Surgeon: Brigitte Platt MD;  Location: [facility];  Service: Vascular    VASCULAR SURGERY        Bedside Swallow Evaluation:    Summary:  Pt presented w/ mild oropharyngeal dysphagia. Currently on dysphagia 2 (mechanical soft)/thin liquids. Trials of regular solids, soft solids, puree, and thin liquids initiated. Adequate labial seal and retrieval via all modalities. Prolonged mastication with regular solids and noted mastication fatigue. Adequate mastication with soft solids. Mildly delayed pharyngeal swallow. X1 throat clear with regular solids. No overt s/s of aspiration with soft solid, puree, and thin liquids.      Recommendations:  Diet: Dysphagia 3 (dental soft)  Liquid: Thin liquids   Meds: Whole in puree  Supervision: Intermittent   Positioning:Upright  Strategies: Small bites/sips, slow rate of pace   Pt to take PO/Meds only when fully alert and upright.   Oral care: 2-3x/day  Aspiration precautions  Reflux precautions  Therapy Prognosis: Guarded  Prognosis considerations:  Cognition  Frequency: 2-3x/wk as able/appropriate    Consider consult w/:  Nutrition    Goal(s):  Pt will tolerate least restrictive diet w/out s/s aspiration or oral/pharyngeal difficulties.     Dysphagia LTG  -Patient will demonstrate safe and effective oral intake (without overt s/s significant oral/pharyngeal dysphagia including s/s penetration or aspiration) for the highest appropriate diet level.     Short Term Goals:    -Pt will tolerate Dysphagia 3/advanced (dental soft) diet and thin liquid with no significant s/s oral or pharyngeal dysphagia across 1-3 diagnostic session/s.    -Patient will tolerate trials of upgraded food and/or liquid texture with no significant s/s of oral or pharyngeal dysphagia including aspiration across 1-3 diagnostic sessions     -Patient will comply with a Video/Modified Barium Swallow study if indicated for more complete assessment of swallowing anatomy/physiology/aspiration risk and to assess efficacy of treatment techniques      H&P/Admit info/ pertinent provider notes: (PMH noted above)  Chief Complaint: sore throat     Carlos Randolph is a 84 y.o. male with a PMH of CAD status post CABG in remote past, PVD, dementia who presents to ED upon recommendation of urgent care provider for evaluation of several days of URI symptoms including cough, subjective fevers, and sore throat which he endorsed today to his caregiver.  Possible reported sick contacts with caregiver/daughter.  Patient was otherwise in normal state of health prior to symptom onset.  He denies any nausea/vomiting/diarrhea/abdominal pain.  No orthopnea/leg swelling reported.  History primarily provided by daughter and caregiver at bedside.  Per caregiver, patient requires 24-hour assistance however patient is reported to be somewhat independent with daily ADLs at home.  No recent illnesses reported      Special Studies:  Head CT 11/8/24:    IMPRESSION:     No acute intracranial abnormality.     Similar mild chronic  microangiopathy    XR Chest 11/8/24:  IMPRESSION:     Right infrahilar opacity can represent pneumonia in the appropriate clinical setting.    Previous MBS:  None found on Epic     Patient's goal: None stated     Did the pt report pain? None   If yes, was nursing notified/was it addressed?    Reason for consult:  R/o aspiration  Determine safest and least restrictive diet  respiratory compromise    Precautions:  Aspiration  Droplet    Food Allergies: NKFA   Current Diet: Dysphagia 2 (mechanical soft)/thin liquids   Premorbid diet: Regular/thin   O2 requirement: NC   Social/Prior living SNF   Voice/Speech: WNL   Follows commands: Intermittent 1 step   Cognitive status: Impaired- dementia      Oral mech exam:  Dentition: Natural   Oral mech grossly WNL  Oral care     Items administered: Puree, soft solid, regular solid, thin liquids     Oral stage:  Lip closure:+  Mastication:-  Bolus formation:-  Bolus control:-  Transfer:+  Oral residue:-  Pocketing:-    Pharyngeal stage:  Swallow promptness:+  Laryngeal rise:+  Wet voice:-  Throat clear:+  Cough:-  Audible swallows:    Esophageal stage:  No s/s reported    Results d/w:  Pt, nursing, physician

## 2024-11-12 VITALS
RESPIRATION RATE: 18 BRPM | SYSTOLIC BLOOD PRESSURE: 155 MMHG | BODY MASS INDEX: 29.35 KG/M2 | HEART RATE: 68 BPM | OXYGEN SATURATION: 95 % | WEIGHT: 149.47 LBS | TEMPERATURE: 97.6 F | HEIGHT: 60 IN | DIASTOLIC BLOOD PRESSURE: 75 MMHG

## 2024-11-12 PROBLEM — A41.9 SEPSIS (HCC): Status: RESOLVED | Noted: 2024-11-08 | Resolved: 2024-11-12

## 2024-11-12 PROBLEM — G93.41 METABOLIC ENCEPHALOPATHY: Status: RESOLVED | Noted: 2024-11-10 | Resolved: 2024-11-12

## 2024-11-12 PROBLEM — I82.441 ACUTE DEEP VEIN THROMBOSIS (DVT) OF TIBIAL VEIN OF RIGHT LOWER EXTREMITY (HCC): Status: ACTIVE | Noted: 2024-11-09

## 2024-11-12 PROCEDURE — 97162 PT EVAL MOD COMPLEX 30 MIN: CPT

## 2024-11-12 PROCEDURE — 97535 SELF CARE MNGMENT TRAINING: CPT

## 2024-11-12 PROCEDURE — 97167 OT EVAL HIGH COMPLEX 60 MIN: CPT

## 2024-11-12 PROCEDURE — 99239 HOSP IP/OBS DSCHRG MGMT >30: CPT

## 2024-11-12 RX ORDER — POLYETHYLENE GLYCOL 3350 17 G/17G
17 POWDER, FOR SOLUTION ORAL DAILY PRN
Qty: 30 EACH | Refills: 0 | Status: SHIPPED | OUTPATIENT
Start: 2024-11-12

## 2024-11-12 RX ADMIN — CARVEDILOL 6.25 MG: 6.25 TABLET, FILM COATED ORAL at 09:50

## 2024-11-12 RX ADMIN — APIXABAN 5 MG: 5 TABLET, FILM COATED ORAL at 09:50

## 2024-11-12 RX ADMIN — ISOSORBIDE MONONITRATE 30 MG: 30 TABLET, EXTENDED RELEASE ORAL at 09:51

## 2024-11-12 RX ADMIN — FAMOTIDINE 40 MG: 20 TABLET, FILM COATED ORAL at 09:50

## 2024-11-12 RX ADMIN — GUAIFENESIN 600 MG: 600 TABLET ORAL at 09:50

## 2024-11-12 RX ADMIN — ASPIRIN 81 MG 81 MG: 81 TABLET ORAL at 09:51

## 2024-11-12 NOTE — PLAN OF CARE
Problem: OCCUPATIONAL THERAPY ADULT  Goal: Performs self-care activities at highest level of function for planned discharge setting.  See evaluation for individualized goals.  Description: Treatment Interventions: ADL retraining, Functional transfer training, Cognitive reorientation, Compensatory technique education          See flowsheet documentation for full assessment, interventions and recommendations.   Note: Limitation: Decreased ADL status, Decreased cognition     Assessment: Pt is a 84 y.o. male seen for OT evaluation at [facility], admitted 11/8/2024 w/ URI (upper respiratory infection).  Comorbidities affecting pt's functional performance at time of assessment include: CAD status post CABG in remote past, PVD, dementia.  Prior to admission, pt was living with caregiver in a 1 SH with no CIELO.  Pt was I w/  ADLS and required assist for IADLS. Required no DME/AD PTA. Upon evaluation, pt appears to be performing below baseline functional status. Pt currently requires sup for bed mobility, sup for functional mobility/transfers, sup for UB ADLs and sup for LB ADLS 2* the following deficits impacting occupational performance: decreased tolerance, impaired GMC, impaired memory, impaired problem solving, impulsivity, and decreased safety awareness. Full objective findings from OT assessment regarding body systems outlined above. Personal factors affecting pt at time of IE include:limited insight into deficits. These impairments, as well as risk for falls  limit pt's ability to safely engage in all baseline areas of occupation and mobility. Pt to benefit from continued skilled OT tx while in the hospital to address deficits as defined above and maximize level of functional independence w ADL's and functional mobility. Occupational Performance areas to address include: bathing/shower, toilet hygiene, dressing, and functional mobility.  This evaluation required an extensive review of medical and/or  therapy records and additional review of physical, cognitive and psychosocial history related to functional performance. Based upon functional performance deficits and assessments, this evaluation has been identified as a high complexity evaluation.  At this time, OT recommendations at time of discharge are home with no OT needs. Caregiver at bedside educated on strategies to maintain pt safety given change in cognitive status.     Rehab Resource Intensity Level, OT: No post-acute rehabilitation needs

## 2024-11-12 NOTE — ASSESSMENT & PLAN NOTE
Recently diagnosed in setting of travel  Outpatient VAS duplex 10/28 showing evidence suggestive of acute occlusive deep vein thrombosis noted in a short segment of the posterior tibial vein at the proximal/mid calf.  Per caregiver, patient reportedly taking Eliquis 5mg BID as prescribed by PCP in [place]      Continue eliquis on d/c

## 2024-11-12 NOTE — OCCUPATIONAL THERAPY NOTE
Occupational Therapy Evaluation & Treat     Patient Name: Carlos Randolph  Today's Date: 11/12/2024  Problem List  Principal Problem:    URI (upper respiratory infection)  Active Problems:    Dementia (HCC)    Coronary artery disease involving native coronary artery    Acute deep vein thrombosis (DVT) of tibial vein of right lower extremity (HCC)    Past Medical History  Past Medical History:   Diagnosis Date    Carotid artery disease (HCC)     Claudication in peripheral vascular disease (HCC)     High cholesterol      Past Surgical History  Past Surgical History:   Procedure Laterality Date    BYPASS GRAFT  1996    CAROTID STENT  2012    COLONOSCOPY      CORONARY ARTERY BYPASS GRAFT  1995    IR AORTAGRAM WITH RUN-OFF  7/2/2020    [place] SLCTV CATHJ 3RD+ ORD SLCTV ABDL PEL/LXTR BRNCH Right 7/2/2020    Procedure: ARTERIOGRAM, RIGHT LEG ANGIOGRAM, BALLOON ANGIOPLASTY AND STENTING OF RIGHT SFA;  Surgeon: Brigitte Platt MD;  Location[facility];  Service: Vascular    VASCULAR SURGERY             11/12/24 1033   OT Last Visit   OT Visit Date 11/12/24   Note Type   Note type Evaluation  (& Treat)   Pain Assessment   Pain Assessment Tool 0-10   Restrictions/Precautions   Other Precautions Contact/isolation;Droplet precautions;Cognitive;Chair Alarm;Bed Alarm;Fall Risk   Home Living   Type of Home House   Home Layout One level  (0 CIELO)   Bathroom Shower/Tub Walk-in shower   Bathroom Toilet Standard   Bathroom Equipment Grab bars in shower   Home Equipment   (no DME at baseline)   Prior Function   Level of [place] Independent with functional mobility;Independent with ADLs;Needs assistance with IADLS   Lives With Other (Comment)  (24/7 caregiver)   Subjective   Subjective Pt received in supine position. Pt pleasantly confused t/o.   ADL   Eating Assistance 7  Independent   Grooming Assistance 7  Independent   UB Bathing Assistance 5  Supervision/Setup   LB Bathing Assistance 5  Supervision/Setup   UB Dressing Assistance 5   Supervision/Setup   LB Dressing Assistance 5  Supervision/Setup   Toileting Assistance  5  Supervision/Setup   Additional Comments Bathing not formally assessed. Above assist levels based on functional observations   Bed Mobility   Supine to Sit 5  Supervision   Additional items Increased time required   Additional Comments Pt remained seated in recliner by end of session.   Transfers   Sit to Stand 5  Supervision   Additional items Impulsive;Verbal cues   Stand to Sit 5  Supervision   Additional items Verbal cues;Impulsive   Functional Mobility   Functional Mobility 5  Supervision   Additional Comments no AD   Balance   Static Sitting Fair +   Dynamic Sitting Fair   Static Standing Fair +   Dynamic Standing Fair +   Activity Tolerance   Activity Tolerance Patient tolerated treatment well   Medical Staff Made Aware PT Navneet   RUE Assessment   RUE Assessment WFL   LUE Assessment   LUE Assessment WFL   Cognition   Overall Cognitive Status Impaired   Arousal/Participation Alert   Attention Within functional limits   Orientation Level Oriented to person;Disoriented to place;Disoriented to time;Disoriented to situation   Memory Decreased recall of precautions;Decreased recall of recent events;Decreased short term memory;Decreased recall of biographical information   Following Commands Follows one step commands with increased time or repetition   Assessment   Limitation Decreased ADL status;Decreased cognition   Assessment Pt is a 84 y.o. male seen for OT evaluation at [facility], admitted 11/8/2024 w/ URI (upper respiratory infection).  Comorbidities affecting pt's functional performance at time of assessment include: CAD status post CABG in remote past, PVD, dementia.  Prior to admission, pt was living with caregiver in a 1  with no CIELO.  Pt was I w/  ADLS and required assist for IADLS. Required no DME/AD PTA. Upon evaluation, pt appears to be performing below baseline functional status. Pt currently  requires sup for bed mobility, sup for functional mobility/transfers, sup for UB ADLs and sup for LB ADLS 2* the following deficits impacting occupational performance: decreased tolerance, impaired GMC, impaired memory, impaired problem solving, impulsivity, and decreased safety awareness. Full objective findings from OT assessment regarding body systems outlined above. Personal factors affecting pt at time of IE include:limited insight into deficits. These impairments, as well as risk for falls  limit pt's ability to safely engage in all baseline areas of occupation and mobility. Pt to benefit from continued skilled OT tx while in the hospital to address deficits as defined above and maximize level of functional independence w ADL's and functional mobility. Occupational Performance areas to address include: bathing/shower, toilet hygiene, dressing, and functional mobility.  This evaluation required an extensive review of medical and/or therapy records and additional review of physical, cognitive and psychosocial history related to functional performance. Based upon functional performance deficits and assessments, this evaluation has been identified as a high complexity evaluation.  At this time, OT recommendations at time of discharge are home with no OT needs. Caregiver at bedside educated on strategies to maintain pt safety given change in cognitive status.   Goals   Patient Goals Pt did not verbalize goals   Plan   Treatment Interventions ADL retraining;Functional transfer training;Cognitive reorientation;Compensatory technique education   Goal Expiration Date 11/22/24   OT Treatment Day 0   OT Frequency 2-3x/wk   Discharge Recommendation   Rehab Resource Intensity Level, OT No post-acute rehabilitation needs   Additional Comments  The patient's raw score on the AM-PAC Daily Activity inpatient short form is 19, standardized score is 40.22, greater than 39.4. Patients at this level are likely to benefit from DC  to home. However please refer to therapist recommendation for discharge planning given other factors that may influence destination.   AM-PAC Daily Activity Inpatient   Lower Body Dressing 3   Bathing 3   Toileting 3   Upper Body Dressing 3   Grooming 3   Eating 4   Daily Activity Raw Score 19   Daily Activity Standardized Score (Calc for Raw Score >=11) 40.22   AM-PAC Applied Cognition Inpatient   Following a Speech/Presentation 1   Understanding Ordinary Conversation 2   Taking Medications 1   Remembering Where Things Are Placed or Put Away 1   Remembering List of 4-5 Errands 1   Taking Care of Complicated Tasks 1   Applied Cognition Raw Score 7   Applied Cognition Standardized Score 15.17   Additional Treatment Session   Start Time 1020   End Time 1033   Treatment Assessment Pt performed functional mobility into bathroom with supervision, followed by toilet transfer with superivsion. Pt benefited from VC for hand placement on grab bar for safe descent. Pt then performed hand washing at sink with supervision with VC needed for managing faucet/soap dispenser. Returned to seated position in recliner with superivsion. Remained seated in recliner. NAD. All needs met.   End of Consult   Education Provided Yes;Family or social support of family present for education by provider   Patient Position at End of Consult Seated edge of bed;All needs within reach;Bed/Chair alarm activated   Nurse Communication Nurse aware of consult         Pt will achieve the following goals within 10 days.    *Pt will complete UB bathing and dressing with mod I.    *Pt will complete LB bathing and dressing with mod I .    * Pt will complete toileting w/ mod I w/ G hygiene/thoroughness using DME PRN    *Pt will complete bed mobility with mod I, with bed flat and no side rail to prep for purposeful tasks    *Pt will perform functional transfers with on/off all surfaces with mod I using DME as needed w/ G balance/safety.    *Patient will  verbalize 3 safety awareness/ principles to prevent falls in the home setting.     *Pt will improve functional mobility during ADL/IADL/leisure tasks to mod I using DME as needed w/ G balance/safety.       Elise Stephenson, OTR/L

## 2024-11-12 NOTE — ASSESSMENT & PLAN NOTE
Metabolic enceophalopathy poa secondary to sepsis  Evidence by confusion in setting of known dementia   Patient improved

## 2024-11-12 NOTE — PLAN OF CARE
Problem: PAIN - ADULT  Goal: Verbalizes/displays adequate comfort level or baseline comfort level  Description: Interventions:  - Encourage patient to monitor pain and request assistance  - Assess pain using appropriate pain scale  - Administer analgesics based on type and severity of pain and evaluate response  - Implement non-pharmacological measures as appropriate and evaluate response  - Consider cultural and social influences on pain and pain management  - Notify physician/advanced practitioner if interventions unsuccessful or patient reports new pain  Outcome: Progressing     Problem: INFECTION - ADULT  Goal: Absence or prevention of progression during hospitalization  Description: INTERVENTIONS:  - Assess and monitor for signs and symptoms of infection  - Monitor lab/diagnostic results  - Monitor all insertion sites, i.e. indwelling lines, tubes, and drains  - Monitor endotracheal if appropriate and nasal secretions for changes in amount and color  - [place] appropriate cooling/warming therapies per order  - Administer medications as ordered  - Instruct and encourage patient and family to use good hand hygiene technique  - Identify and instruct in appropriate isolation precautions for identified infection/condition  Outcome: Progressing     Problem: DISCHARGE PLANNING  Goal: Discharge to home or other facility with appropriate resources  Description: INTERVENTIONS:  - Identify barriers to discharge w/patient and caregiver  - Arrange for needed discharge resources and transportation as appropriate  - Identify discharge learning needs (meds, wound care, etc.)  - Arrange for interpretive services to assist at discharge as needed  - Refer to Case Management Department for coordinating discharge planning if the patient needs post-hospital services based on physician/advanced practitioner order or complex needs related to functional status, cognitive ability, or social support system  Outcome: Progressing      Problem: Knowledge Deficit  Goal: Patient/family/caregiver demonstrates understanding of disease process, treatment plan, medications, and discharge instructions  Description: Complete learning assessment and assess knowledge base.  Interventions:  - Provide teaching at level of understanding  - Provide teaching via preferred learning methods  Outcome: Progressing     Problem: NEUROSENSORY - ADULT  Goal: Achieves stable or improved neurological status  Description: INTERVENTIONS  - Monitor and report changes in neurological status  - Monitor vital signs such as temperature, blood pressure, glucose, and any other labs ordered   - Initiate measures to prevent increased intracranial pressure  - Monitor for seizure activity and implement precautions if appropriate      Outcome: Progressing     Problem: GENITOURINARY - ADULT  Goal: Maintains or returns to baseline urinary function  Description: INTERVENTIONS:  - Assess urinary function  - Encourage oral fluids to ensure adequate hydration if ordered  - Administer IV fluids as ordered to ensure adequate hydration  - Administer ordered medications as needed  - Offer frequent toileting  - Follow urinary retention protocol if ordered  Outcome: Progressing     Problem: RESPIRATORY - ADULT  Goal: Achieves optimal ventilation and oxygenation  Description: INTERVENTIONS:  - Assess for changes in respiratory status  - Assess for changes in mentation and behavior  - Position to facilitate oxygenation and minimize respiratory effort  - Oxygen administered by appropriate delivery if ordered  - Initiate smoking cessation education as indicated  - Encourage broncho-pulmonary hygiene including cough, deep breathe, Incentive Spirometry  - Assess the need for suctioning and aspirate as needed  - Assess and instruct to report SOB or any respiratory difficulty  - Respiratory Therapy support as indicated  Outcome: Progressing     Problem: METABOLIC, FLUID AND ELECTROLYTES - ADULT  Goal:  Electrolytes maintained within normal limits  Description: INTERVENTIONS:  - Monitor labs and assess patient for signs and symptoms of electrolyte imbalances  - Administer electrolyte replacement as ordered  - Monitor response to electrolyte replacements, including repeat lab results as appropriate  - Instruct patient on fluid and nutrition as appropriate  Outcome: Progressing

## 2024-11-12 NOTE — ASSESSMENT & PLAN NOTE
MOCA 15/30 (prev 16/30), pHQ neg, TUGT 10sec  Patient with deficits in visual spatial, executive function, attention, language, abstraction, memory, delayed recall and orientation domains  Prior MRI neuroquant showing cerebral volume loss slightly more prominent within the left temporal lobe than the right with mild asymmetry in the hippocampus and adjacent inferior lateral ventricle, as   well as the surrounding temporal lobe  NeuroQuant analysis was performed: Low hippocampal volume and enlargement of the adjacent inferior lateral venricles suggestive of local ex-vacuo dilatation  In addition there was an abnormal hippocampal occupancy score  Findings support a medial temporal lobe focused neurodegenerative etiology    Patient previously enrolled in speech therapy however had discontinued going as he felt it was becoming overwhelming  Daughter requesting speech therapy for cognitive rehabilitation for twice monthly scheduling, referral placed  Patient remains independent in all ADLs and IADLs  No safety concerns at home or with driving  Mood remains stable, no personality changes  Previously discussed cholinesterase therapy which was declined by family  Patient has enrolled into a clinical trials with a [facility] team  Currently the patient remains at a level of mild dementia  Reorientation redirection as needed  Manage chronic conditions  Maintain [place] precautions  Remain active mentally, physically and socially  Participate in cognitively challenging exercises as able  Consider cognitively stimulating online apps  Will follow-up in 1 year [FreeTextEntry1] : 64 yo wf w/ hx of thyroid ca s/p thyroidectomy, Dx with SLE / RUPUS 30 ys ago, depression and 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 resolved w/ no sequelae.. actually all pain resolved for 2-3 wks following this infection and last infection few ys ago. Presents today reassessment Orencia infusion new dose 500mg 24 -Started Orencia  but allergic reactions.. eventually switched..  - started Benlysta    1) SLE w/ prominent symmetrical synovitis: : + DILLON 1:320, low + dsDNA > 1000, low complements (recently nl), + CAVi 39 (early Sjogrens) + Chromatin 4.0, antihistone 4.1, + LAC by DRVVT and ACL IGM > 150, B2G IGM > 150, phosphatidyl serine IGM 52.. (no reported bleeding/ clotting dyscrasia) but caution NSAIDs w/ neg RF/ CCP and all other subserologies to include ACE/ ANCA/ PR3/MPO.. - Presented at age 29 with severe joint symmetrical inflammation, RP, lymphadenopathy (mild intermittent), sicca ITP (severe x 2 episodes), hypercoagulability (dx 2018), hair thinning, and proteinuria..  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 and on Benlysta infusions since .. doing VERY well today.  Notes some "tightness"  in throat 12-24 hs after infusion but no hoarsenss/ / cough- resolves spontaneously.. will continue w/ solumedrol 40 mg / APAP and Cetrizine with infusions and advised 20 mg pred on day after..  If reaction worsens will again have to make change...  Other options: Could also consider Saphnelo, RTX but only if no improvement Benlysta.   If need to add additional therapy would be cautious with AZA given chronic leukopenia) NOTE: - Orencia autoinjector +response but not covered by insurance- though well tolerated and effective but not covered - infusions associated with strange infusion reactions.. Orencia  - Chronic atypical CP:  echo  trace pericardial effusion as noted on CTscan (prior to starting Orencia) CTscan chest  + small pericardial effusion, pericardial thickening and small loculated L pleural effusion/ thickening; Colchicine at any dose not effective and not tolerated! Severe GI distress upper / lower - In recent past tried both MTX/ and LEF didn't tolerate either.. severe GI distress, and MTX associated w/ oral sicca possibly (per pt) - Seen by Dr Hightower and research team  but not eligible / hx thyroid ca - Intermittent need for steroids.. always w/ + response... but doesn't like how feels, only really tolerates 5 mg daily.. (10 mg temp insensitivity, trouble sleeping, restless) - MTX and LEF associated iwth severe GI distress.. 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 No bleeding / clotting dyscrasias. No miscarriage  (c sections)- chronic leukopenia - No hx splenomegaly despite hx ITP.. (US this year - "sludgy).. no formal gallstones - Knee pain chronically with OA noted on imaging moderate DJD  2) AI thyroid disease, thyroid cancer s/p thyroidectomy: Hypothyroidism on replacement TFTs intermittently elevated TSH.. on replacement recently adjusted dose when TSH 15 ()... Working w/ Endo (Dr KHAN) reportedly updated study nl  3) FM, secondary: Dx > 10 ys ago. Doing well on current regimen of wellbutrin 300 mg and better sleep with Lyrica 100 mg at HS and cyclobenzaprine 10 mg every night.  If continues to do well, will try to lower pharm burden slowly.. as tolerated.   - Sleep/TMJ: Now more consistent use Flexeril 10mg, lyrica 2-3 tabs, and MM w/ marked  improvement in sleep. Very c/w mouth guard- but notes dryness and irritation cw/ angular cheilosis.. ? unclear if mechanical or vitamin r/t advised to add B complex to regimen and consider biotene products for oral dryness (drug induced)   - Myalgias/Weakness: marked improvement with better control of SLE.. - Paresthesias: intermittent, improved with Lyrica. - IBS-D (last colonoscopy > 5 ys ago- next wk), mild GERD initially, worse with MTX and even worse w/ LEF and colchicine, better now. Not active at this time- and remains off all of these agents  - Mood: marked improvement with control of underlying condition. NOTE Celexa in past x 6 m not effective Ambien: effective but logistical issues Naproxen / Feldene: not tolerated severe memory issues Gabapentin in past can't remember efficacy or SE/ Lyrica tolerated at low doses but severe sicca 100 mg.. can't increase   4) Osteopenia: last 22 w/ most severe Tscore -1.6 at spine.. essentially unchanged from Dexa 2019 (see data reviewed for detail)- updated study needed  Denies fractures, no previous treatment -Per pt updated Dexa ordered by GYN  5) HA: w/ dizziness better lately.. TMJ ongoing pain, crepitus.. better with treatment above for SLE now... - working w/ Dr Cordova for further eval.. 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 and NEW onset vesicular lesions.. r/t severe viral infections Most likely due to combination of triple+ infection COVID/Enterorhinovirus/RSV and orencia infusion 6 days prior to onset Fully resolved w/o recurrent, likely contributing to fatigue (see above)  Age appropriate malignancy screening: colonoscopy > 5 ys / endoscopy -ys ago , mammogram- GYN ordered update, pap smear 3 ys neg , CXR  Plan 24: - Administered 2cc 1% Lidocaine to trapezius muscle TP - Give Tizanidine prescription, return to 10mg Cyclobenzaprine if no longer works - Hold Lyrica to see if it improves mouth dryness - Give 1mo's worth of trial Cevimeline (use up to 3x per day) for mouth dryness - Try Lidocaine patches for neck TP - Rub Voltaren gel on neck 4x per day - Telehealth in 1mo after next infusion - Follow-up appt in 2025  PLAN -Continue mnthly  Benlysta with labs qoinfusion..   - given severe infusion reactions with Orencia and suggestion of possible reaction with Benlysta as noted, will continue with steroids pre infusion/ and oral pred 20 mg next am.. with APAP and H1i as well..   - advised to add B complex to regimen and consider biotene products for oral dryness (drug induced)    -Continue PRN prednisone 5mg tabs 1-4 titration to use PRN for swelling and joint pain (doesn't tolerate > 5 mg well)  - need to review Dexa ?? was this done?   -Continue low dose flexeril 10 mg and 100 mg Lyrica q HS   -Along with routine use of dental device at next visit,  -Notify provider if sx worsening  - RPA 3 m

## 2024-11-12 NOTE — PHYSICAL THERAPY NOTE
PHYSICAL THERAPY Evaluation and Discharge Summary  DATE: 11/12/24  TIME: 0931-5932    NAME:  Carlos Randolph  AGE:   84 y.o.  Mrn:   [id number]  Length Of Stay: 3    ADMIT DX:  Sore throat [J02.9]  Altered mental status, unspecified [R41.82]    Past Medical History:   Diagnosis Date    Carotid artery disease (HCC)     Claudication in peripheral vascular disease (HCC)     High cholesterol      Past Surgical History:   Procedure Laterality Date    BYPASS GRAFT  1996    CAROTID STENT  2012    COLONOSCOPY      CORONARY ARTERY BYPASS GRAFT  1995    IR AORTAGRAM WITH RUN-OFF  7/2/2020    NE SLCTV CATHJ 3RD+ ORD SLCTV ABDL PEL/LXTR BRNCH Right 7/2/2020    Procedure: ARTERIOGRAM, RIGHT LEG ANGIOGRAM, BALLOON ANGIOPLASTY AND STENTING OF RIGHT SFA;  Surgeon: Brigitte Platt MD;  Location: [facility];  Service: Vascular    VASCULAR SURGERY          11/12/24 0958   PT Last Visit   PT Visit Date 11/12/24   Note Type   Note type Evaluation   Additional Comments 85 y/o presents due to fever, cough, sore throat, AMS, urinary incontinence.  Upon assessment: tachypnea, LBBB, CXR: left sided pneumonia, leukocytosis. Dx: upper respiratory infection.   Pain Assessment   Pain Assessment Tool 0-10   Pain Score No Pain   Restrictions/Precautions   Weight Bearing Precautions Per Order No   Other Precautions Cognitive;Chair Alarm;Bed Alarm   Home Living   Additional Comments Pt lives with 24/7 caregiver in 1-level house, 0 CIELO. walk-in shower with bars. standard toilet.   Prior Function   Comments Prior to admission: independent with ADL and ambulates without AD.  Pt relies on 24/7 caregiver to assist with IADLs.   General   Family/Caregiver Present Yes   Cognition   Overall Cognitive Status Impaired   Arousal/Participation Alert   Orientation Level Disoriented X4   Following Commands Follows one step commands with increased time or repetition   Subjective   Subjective Pt denies any complaints although is very confused having difficulty  with subjective questioning.   RUE Assessment   RUE Assessment WFL   LUE Assessment   LUE Assessment WFL   RLE Assessment   RLE Assessment WFL   LLE Assessment   LLE Assessment WFL   Coordination   Movements are Fluid and Coordinated 1   Sensation WFL   Bed Mobility   Supine to Sit 5  Supervision   Additional items Assist x 1;Increased time required;Verbal cues   Transfers   Sit to Stand 6  Modified independent   Additional items Impulsive;Verbal cues   Stand to Sit 6  Modified independent   Additional items Verbal cues;Impulsive   Ambulation/Elevation   Gait Assistance 5  Supervision   Additional items Assist x 1;Verbal cues   Assistive Device None   Distance 25'   Ambulation/Elevation Additional Comments No significant gait pattern deficits.  Pt is slightly impulsive and having difficulty with following instructions due to confusion, but displayed adequate balance and step coordination to manage ambulation in the room.   Balance   Static Sitting Fair +   Static Standing Fair +   Ambulatory Fair +   Endurance Deficit   Endurance Deficit No   Activity Tolerance   Activity Tolerance Patient tolerated treatment well   Assessment   Problem List Decreased cognition;Decreased safety awareness;Impaired judgement   Assessment Orders for PT eval and treat received. Pt's PMHx: PVD/claudication, CAD with stent/CABG, dementia, DVT. Pt exhibits physical deficits noted in problem list above.     Additional considerations affecting pt's discharge planning: Progressive cognitive decline affecting safety awareness/insight    During today's session, formal PT evaluation complete.  Pt displays no significant physical deficits, but is very confused today.  Pt requires 24/7 supervision at home due to hx of dementia, but is able to manage functional mobility and ADLs with instructions.  Pt does not appear to have physical therapy intervention needs at this time.  Will discharge PT order.     The AM-PAC Mobility Score was used to assist  in determining pt safety w/ mobility/self care & appropriate d/c recommendations, see above for scores. Patient's clinical presentation is evolving due to altered mental status and ongoing medical management needs.     From a PT/mobility standpoint given the above findings, DC recommendation is level: No Post Acute Rehabilitation Needs   Barriers to Discharge None   Plan   PT Frequency   (Evaluation and Discharge Summary)   Discharge Recommendation   Rehab Resource Intensity Level, PT No post-acute rehabilitation needs   AM-PAC Basic Mobility Inpatient   Turning in Flat Bed Without Bedrails 4   Lying on Back to Sitting on Edge of Flat Bed Without Bedrails 4   Moving Bed to Chair 4   Standing Up From Chair Using Arms 4   Walk in Room 4   Climb 3-5 Stairs With Railing 3   Basic Mobility Inpatient Raw Score 23   Basic Mobility Standardized Score 50.88   [facility] Highest Level Of Mobility   -HLM Goal 7: Walk 25 feet or more   -HLM Achieved 7: Walk 25 feet or more   End of Consult   Patient Position at End of Consult Bedside chair;All needs within reach;Bed/Chair alarm activated     The patient's AM-PAC Basic Mobility Inpatient Short Form Raw Score is 23. A Raw score of greater than or equal to 16 suggests the patient may benefit from discharge to home. Please also refer to the recommendation of the Physical Therapist for safe discharge planning.    Christian Riddle, PT, DPT  [place] Licensure #[id number]

## 2024-11-12 NOTE — DISCHARGE SUMMARY
"Discharge Summary - Hospitalist   Name: Carlos Randolph 84 y.o. male I MRN: [id number]  Unit/Bed#: -01 I Date of Admission: 11/8/2024   Date of Service: 11/12/2024 I Hospital Day: 3     Assessment & Plan  URI (upper respiratory infection)  Patient presenting with URI symptoms progressively worsened with sore throat, fevers and chills.  Seen at urgent care PTA, referred to ED for evaluation.  Febrile on admission to 102.4, tachycardic to 123, satting 88% requiring 4 L O2 NC  + Parainfluenza  Plan:   Positive for parainfluenza, d/c home as pt is improved clinically  Dementia (HCC)  History of dementia noted, requires 24-hour caregiving assistance at home  Previously followed by geriatrics outpatient      Coronary artery disease involving native coronary artery  History of CAD status post CABG x 3 in 1995  PVD status post SFA stent  Follows with cardiology  Last cardiac cath reviewed  EF normal on last nuclear stress test in 2021  Being managed medically with ASA/statin, antianginals  Currently euvolemic appearing  Plan: Continue home ASA/statin, Imdur, as needed nitroglycerin for angina      Sepsis (HCC) (Resolved: 11/12/2024)  Meeting SIRS on admission with max 1-2.4, tachycardia 123, respirations 20  Lactate elevated 3.0 on her ED arrival improved to 1.7 after IV fluid bolus.  Blood cultures obtained in ED, given IV ceftriaxone 1g x 1  Source likely URI driven  Plan: See assessment and plan for \"URI\"    Acute deep vein thrombosis (DVT) of tibial vein of right lower extremity (HCC)  Recently diagnosed in setting of travel  Outpatient VAS duplex 10/28 showing evidence suggestive of acute occlusive deep vein thrombosis noted in a short segment of the posterior tibial vein at the proximal/mid calf.  Per caregiver, patient reportedly taking Eliquis 5mg BID as prescribed by PCP in [place]      Continue eliquis on d/c  Metabolic encephalopathy (Resolved: 11/12/2024)  Metabolic enceophalopathy poa secondary to " sepsis  Evidence by confusion in setting of known dementia   Patient improved     Medical Problems       Resolved Problems  Date Reviewed: 11/10/2024            Resolved    Sepsis (HCC) 11/12/2024     Resolved by  Sloane Parra DO    Metabolic encephalopathy 11/12/2024     Resolved by  Sloane Parra DO        Discharging Physician / Practitioner: Sloane Parra DO  PCP: Bg Dooley MD  Admission Date:   Admission Orders (From admission, onward)       Ordered        11/09/24 1525  INPATIENT ADMISSION  Once            11/08/24 1849  Place in Observation  Once                          Discharge Date: 11/12/24    Consultations During Hospital Stay:  None    Procedures Performed:   None    Significant Findings / Test Results:   Parainfluenza    Incidental Findings:   None    Test Results Pending at Discharge (will require follow up):   none     Outpatient Tests Requested:  none    Complications: None    Reason for Admission: URI    Hospital Course:   Carlos Randolph is a 84 y.o. male patient who originally presented to the hospital on 11/8/2024 due to sore throat, fevers, chills.  RPR panel showed positive parainfluenza, patient was treated supportively and off of antibiotics.  He improved clinically and was able to be discharged home with his caregiver.    Please see above list of diagnoses and related plan for additional information.     Condition at Discharge: good    Discharge Day Visit / Exam:   Subjective: Patient laying in bed comfortably, caregiver states his cough is improved, but still present at times.  He is eating and drinking well.   Vitals: Blood Pressure: 155/75 (11/12/24 0957)  Pulse: 68 (11/12/24 0957)  Temperature: 97.6 °F (36.4 °C) (11/12/24 0652)  Temp Source: Oral (11/12/24 0652)  Respirations: 18 (11/12/24 0652)  Height: 5' (152.4 cm) (11/08/24 2130)  Weight - Scale: 67.8 kg (149 lb 7.6 oz) (11/12/24 0553)  SpO2: 95 % (11/12/24 0652)  Physical Exam  Vitals and nursing note  reviewed.   Constitutional:       General: He is not in acute distress.     Appearance: He is well-developed.   HENT:      Head: Normocephalic and atraumatic.   Eyes:      Conjunctiva/sclera: Conjunctivae normal.   Cardiovascular:      Rate and Rhythm: Normal rate and regular rhythm.      Heart sounds: No murmur heard.  Pulmonary:      Effort: Pulmonary effort is normal. No respiratory distress.      Breath sounds: Normal breath sounds.   Abdominal:      General: Abdomen is flat. Bowel sounds are normal. There is no distension.      Palpations: Abdomen is soft.      Tenderness: There is no abdominal tenderness.   Musculoskeletal:         General: No swelling.      Cervical back: Neck supple.      Right lower leg: No edema.      Left lower leg: No edema.   Skin:     General: Skin is warm and dry.      Capillary Refill: Capillary refill takes less than 2 seconds.   Neurological:      Mental Status: He is alert.   Psychiatric:         Mood and Affect: Mood normal.          Discussion with Family: Updated  (caregiver) at bedside.    Discharge instructions/Information to patient and family:   See after visit summary for information provided to patient and family.      Provisions for Follow-Up Care:  See after visit summary for information related to follow-up care and any pertinent home health orders.      Mobility at time of Discharge:   Basic Mobility Inpatient Raw Score: 18  JH-HLM Goal: 6: Walk 10 steps or more  JH-HLM Achieved: 6: Walk 10 steps or more  HLM Goal achieved. Continue to encourage appropriate mobility.     Disposition:   Home    Planned Readmission: None    Discharge Medications:  See after visit summary for reconciled discharge medications provided to patient and/or family.      Administrative Statements   Discharge Statement:  I have spent a total time of 45 minutes in caring for this patient on the day of the visit/encounter.     **Please Note: This note may have been constructed using  a voice recognition system**

## 2024-11-12 NOTE — ASSESSMENT & PLAN NOTE
Patient presenting with URI symptoms progressively worsened with sore throat, fevers and chills.  Seen at urgent care PTA, referred to ED for evaluation.  Febrile on admission to 102.4, tachycardic to 123, satting 88% requiring 4 L O2 NC  + Parainfluenza  Plan:   Positive for parainfluenza, d/c home as pt is improved clinically

## 2024-11-13 LAB — BACTERIA BLD CULT: NORMAL

## 2024-11-15 LAB
BACTERIA BLD CULT: NORMAL
BACTERIA BLD CULT: NORMAL

## 2024-12-18 ENCOUNTER — EVALUATION (OUTPATIENT)
Dept: SPEECH THERAPY | Facility: CLINIC | Age: 84
End: 2024-12-18
Payer: MEDICARE

## 2024-12-18 DIAGNOSIS — R48.8 OTHER SYMBOLIC DYSFUNCTIONS: Primary | ICD-10-CM

## 2024-12-18 DIAGNOSIS — F03.90 UNCOMPLICATED PRESENILE DEMENTIA (HCC): ICD-10-CM

## 2024-12-18 PROCEDURE — 96125 COGNITIVE TEST BY HC PRO: CPT | Performed by: SPEECH-LANGUAGE PATHOLOGIST
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
## 2024-12-29 ENCOUNTER — OFFICE VISIT (OUTPATIENT)
Dept: URGENT CARE | Facility: CLINIC | Age: 84
End: 2024-12-29
Payer: MEDICARE

## 2024-12-29 VITALS
DIASTOLIC BLOOD PRESSURE: 69 MMHG | SYSTOLIC BLOOD PRESSURE: 132 MMHG | TEMPERATURE: 98.8 F | RESPIRATION RATE: 20 BRPM | HEART RATE: 108 BPM | OXYGEN SATURATION: 95 %

## 2024-12-29 DIAGNOSIS — R32 URINARY INCONTINENCE, UNSPECIFIED TYPE: Primary | ICD-10-CM

## 2024-12-29 PROCEDURE — 99213 OFFICE O/P EST LOW 20 MIN: CPT | Performed by: STUDENT IN AN ORGANIZED HEALTH CARE EDUCATION/TRAINING PROGRAM

## 2024-12-29 PROCEDURE — G0463 HOSPITAL OUTPT CLINIC VISIT: HCPCS | Performed by: STUDENT IN AN ORGANIZED HEALTH CARE EDUCATION/TRAINING PROGRAM

## 2024-12-29 RX ORDER — CEPHALEXIN 500 MG/1
500 CAPSULE ORAL EVERY 12 HOURS SCHEDULED
Qty: 14 CAPSULE | Refills: 0 | Status: SHIPPED | OUTPATIENT
Start: 2024-12-29 | End: 2025-01-05

## 2024-12-29 RX ORDER — PAROXETINE 20 MG/1
1 TABLET, FILM COATED ORAL DAILY
COMMUNITY
Start: 2024-12-02

## 2024-12-29 NOTE — PROGRESS NOTES
[facility]        NAME: Carlos Randolph is a 84 y.o. male  : 1940    MRN: [id number]  DATE: 2024  TIME: 11:55 AM    Assessment and Orders   Urinary incontinence, unspecified type [R32]  1. Urinary incontinence, unspecified type  cephalexin (KEFLEX) 500 mg capsule            Plan and Discussion      Unable to obtain urine for testing given incontinence.  Patient does have lower abdominal pain on palpation.  Difficulty communicating symptoms due to dementia.  Discussed with family that this could be related to prostate however, out of an abundance of caution will cover for possible bacterial infection with Keflex.    Risks and benefits discussed. Patient understands and agrees with the plan.     PATIENT INSTRUCTIONS    If tests have been performed at [facility], our office will contact you with results if changes need to be made to the care plan discussed with you at the visit.  You can review your full results on [facility] MyChart.    Follow up with PCP.     If any of the following occur, please report to your nearest ED for evaluation or call 911.   Difficultly breathing or shortness of breath  Chest pain  Acutely worsening symptoms.         Chief Complaint     Chief Complaint   Patient presents with    Possible UTI     Sx started yesterday, frequency, incontinence, c/o back pain.         History of Present Illness       Patient is an 84-year-old male with dementia who presents after multiple episodes of incontinence starting yesterday.  Nurse practitioner is with him 24 hours a day.  She states that he has been going more frequently.  Usually he is continent and is able to go to the bathroom himself.  In the last 24 hours, he has had multiple instances of incontinence.  Also complaining of abdominal and lower back pain.  Family notes that he does have issue with his prostate but this has not been fully evaluated as of yet.  No fevers.  Does have slight runny nose.  Was around multiple  people with influenza over [place].        Review of Systems   Review of Systems   Genitourinary:  Positive for frequency and urgency (Incontinence).         Current Medications       Current Outpatient Medications:     apixaban (ELIQUIS) 5 mg, Take 1 tablet (5 mg total) by mouth 2 (two) times a day, Disp: 80 tablet, Rfl: 0    aspirin 81 mg chewable tablet, Chew 1 tablet (81 mg total) daily, Disp: 30 tablet, Rfl: 11    atorvastatin (LIPITOR) 40 mg tablet, Take 1 tablet (40 mg total) by mouth every evening, Disp: 90 tablet, Rfl: 2    cephalexin (KEFLEX) 500 mg capsule, Take 1 capsule (500 mg total) by mouth every 12 (twelve) hours for 7 days, Disp: 14 capsule, Rfl: 0    FOLBIC 2.5-25-2 MG, Take 1 tablet by mouth daily, Disp: , Rfl: 4    isosorbide mononitrate (IMDUR) 30 mg 24 hr tablet, Take 1 tablet by mouth once daily, Disp: 30 tablet, Rfl: 0    nitroglycerin (NITROSTAT) 0.4 mg SL tablet, Place 1 tablet (0.4 mg total) under the tongue every 5 (five) minutes as needed for chest pain, Disp: 25 tablet, Rfl: 11    omega-3-acid ethyl esters (LOVAZA) 1 g capsule, Take 2 g by mouth 2 (two) times a day, Disp: , Rfl:     omeprazole (PriLOSEC) 40 MG capsule, Take 40 mg by mouth daily, Disp: , Rfl:     PARoxetine (PAXIL) 20 mg tablet, Take 1 tablet by mouth in the morning, Disp: , Rfl:     Vitamin E 500 UNIT/GM POWD, Use, Disp: , Rfl:     Apoaequorin (PREVAGEN PO), Take by mouth (Patient not taking: Reported on 12/29/2024), Disp: , Rfl:     carvedilol (COREG) 6.25 mg tablet, Take 1 tablet (6.25 mg total) by mouth every 12 (twelve) hours (Patient not taking: Reported on 12/29/2024), Disp: 60 tablet, Rfl: 4    famotidine (PEPCID) 40 MG tablet, Take 1 tablet (40 mg total) by mouth daily, Disp: 90 tablet, Rfl: 1    niacin (NIASPAN) 1000 MG CR tablet, daily (Patient not taking: Reported on 12/29/2024), Disp: , Rfl: 3    polyethylene glycol (MIRALAX) 17 g packet, Take 17 g by mouth daily as needed (constipation) (Patient not  taking: Reported on 12/29/2024), Disp: 30 each, Rfl: 0    Current Allergies     Allergies as of 12/29/2024    (No Known Allergies)            The following portions of the patient's history were reviewed and updated as appropriate: allergies, current medications, past family history, past medical history, past social history, past surgical history and problem list.     Past Medical History:   Diagnosis Date    Carotid artery disease (HCC)     Claudication in peripheral vascular disease (HCC)     High cholesterol        Past Surgical History:   Procedure Laterality Date    BYPASS GRAFT  1996    CAROTID STENT  2012    COLONOSCOPY      CORONARY ARTERY BYPASS GRAFT  1995    IR AORTAGRAM WITH RUN-OFF  7/2/2020    NC SLCTV CATHJ 3RD+ ORD SLCTV ABDL PEL/LXTR BRNCH Right 7/2/2020    Procedure: ARTERIOGRAM, RIGHT LEG ANGIOGRAM, BALLOON ANGIOPLASTY AND STENTING OF RIGHT SFA;  Surgeon: Brigitte Platt MD;  Location: [facility];  Service: Vascular    VASCULAR SURGERY         Family History   Problem Relation Age of Onset    Hypertension Family     Heart disease Family     Stroke Family     No Known Problems Mother     No Known Problems Father          Medications have been verified.        Objective   /69   Pulse (!) 108   Temp 98.8 °F (37.1 °C) (Temporal)   Resp 20   SpO2 95%   No LMP for male patient.       Physical Exam     Physical Exam  Constitutional:       General: He is not in acute distress.     Appearance: He is not ill-appearing.   HENT:      Right Ear: Tympanic membrane and external ear normal.      Left Ear: Tympanic membrane and external ear normal.      Nose: Rhinorrhea present. No congestion.   Cardiovascular:      Rate and Rhythm: Tachycardia present.   Pulmonary:      Effort: Pulmonary effort is normal. No respiratory distress.   Abdominal:      Tenderness: There is abdominal tenderness (Exclaims with pain with palpation to lower abdomen).   Neurological:      Mental Status: He is alert. Mental status  is at baseline.   Psychiatric:         Speech: He is noncommunicative.         Behavior: Behavior is not agitated, aggressive, withdrawn or combative.         Cognition and Memory: Cognition is impaired. Memory is impaired.               Selina Bocanegra DO

## 2025-01-03 ENCOUNTER — APPOINTMENT (OUTPATIENT)
Dept: SPEECH THERAPY | Facility: CLINIC | Age: 85
End: 2025-01-03
Payer: MEDICARE

## 2025-01-03 ENCOUNTER — OFFICE VISIT (OUTPATIENT)
Dept: SPEECH THERAPY | Facility: CLINIC | Age: 85
End: 2025-01-03
Payer: MEDICARE

## 2025-01-03 DIAGNOSIS — F03.90 UNCOMPLICATED PRESENILE DEMENTIA (HCC): ICD-10-CM

## 2025-01-03 DIAGNOSIS — R48.8 OTHER SYMBOLIC DYSFUNCTIONS: Primary | ICD-10-CM

## 2025-01-03 PROCEDURE — 92507 TX SP LANG VOICE COMM INDIV: CPT

## 2025-01-03 NOTE — PROGRESS NOTES
Daily Speech Treatment Note    Today's date: 1/3/2025   Patient’s name: Carlos Randolph  : 1940  MRN: [id number]  Safety measures: Dementia  Referring provider: Camden Pandya MD    Encounter Diagnosis     ICD-10-CM    1. Other symbolic dysfunctions  R48.8       2. 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     Objective/Assessment:  -Reviewed testing results and goals in plan care with patient. Patient is in agreement at this time.      Short Term Goals  1. Patient will be educated on the use of internal and external memory aids and compensatory strategies with 80% accuracy to facilitate increased recall of routine, personal information, and recent events, to be achieved in 4-6 weeks.    2. Patient will complete auditory immediate and short term memory tasks to 80% accuracy to facilitate increased ability to retell narratives and recall information within functional living environment, to be achieved in 4-6 weeks.    3. Patient will complete complex auditory attention processing tasks (e.g., sentence unscramble, ranking numbers/words, etc.) to improve working memory with 80% accuracy, to be achieved in 4-6 weeks.    4. Patient will facilitate planning by completing thought organization tasks (e.g., sequencing, deduction puzzles, etc.) with 80% accuracy to facilitate increased executive functioning, working memory, problem solving, and processing skills, to be achieved in 4-6 weeks.    To target thought organization: Patient was presented with cards showing different patterns and colors.  Two different color piles were made and Patient was instructed to sort cards into the appropriate pile.  He completed this task with only 1  error.       To increase difficulty, Patient completed the same activity as above, but matched 6 colors.  He completed this task with 3 errors.     Next, Patient was presented with 6 patterns and was instructed to sort cards based on pattern.  He demonstrated increased difficulty with this task, making 15 errors.  Max verbal and visual cues were needed to complete.     5. Patient will complete concrete and abstract categorization tasks to 80% accuracy to facilitate improved generative naming skills and working memory, to be achieved in 4-6 weeks.    Patient attempted category matrix task (e.g. naming items beginning with specific letters), but task was discontinued as it was too difficult to complete.     To target categorization: Patient was presented with a list of three items (e.g. apples, plum, orange) and was instructed to state what category the items belong to.  He completed this task in 2/3 (67%) opportunities given max verbal cues.    6. Patient will utilize word finding strategies during semantic feature analysis treatment activity for improved naming and verbal expression skills.      Plan:  -Discussed session and patient's progress with caregiver/family member after today's session.  -Continue with current plan of care.

## 2025-01-04 ENCOUNTER — APPOINTMENT (OUTPATIENT)
Dept: RADIOLOGY | Facility: MEDICAL CENTER | Age: 85
End: 2025-01-04
Payer: MEDICARE

## 2025-01-04 DIAGNOSIS — J20.8 VIRAL BRONCHITIS: ICD-10-CM

## 2025-01-04 PROCEDURE — 71046 X-RAY EXAM CHEST 2 VIEWS: CPT

## 2025-01-10 ENCOUNTER — OFFICE VISIT (OUTPATIENT)
Dept: SPEECH THERAPY | Facility: CLINIC | Age: 85
End: 2025-01-10
Payer: MEDICARE

## 2025-01-10 DIAGNOSIS — R48.8 OTHER SYMBOLIC DYSFUNCTIONS: Primary | ICD-10-CM

## 2025-01-10 DIAGNOSIS — F03.90 UNCOMPLICATED PRESENILE DEMENTIA (HCC): ICD-10-CM

## 2025-01-10 PROCEDURE — 92507 TX SP LANG VOICE COMM INDIV: CPT

## 2025-01-10 NOTE — PROGRESS NOTES
Daily Speech Treatment Note    Today's date: 1/10/2025   Patient’s name: Carlos Randolph  : 1940  MRN: [id number]  Safety measures: Dementia  Referring provider: Camden Pandya MD    Encounter Diagnosis     ICD-10-CM    1. Other symbolic dysfunctions  R48.8       2. Uncomplicated presenile dementia (HCC)  F03.90         POC   Expires Auth Expiration Date ST Visit Limit   2025 BOMN          Visit/Unit Tracking:  Auth Status Date 2024 2025 01/10/2025   BOMN Used 1 2 3    Remaining 9 8 7        Subjective/Behavioral:  -Patient and Clinician talked about cars at the end of session today.     Objective/Assessment:  -Reviewed testing results and goals in plan care with patient. Patient is in agreement at this time.      Short Term Goals  1. Patient will be educated on the use of internal and external memory aids and compensatory strategies with 80% accuracy to facilitate increased recall of routine, personal information, and recent events, to be achieved in 4-6 weeks.    2. Patient will complete auditory immediate and short term memory tasks to 80% accuracy to facilitate increased ability to retell narratives and recall information within functional living environment, to be achieved in 4-6 weeks.    3. Patient will complete complex auditory attention processing tasks (e.g., sentence unscramble, ranking numbers/words, etc.) to improve working memory with 80% accuracy, to be achieved in 4-6 weeks.    4. Patient will facilitate planning by completing thought organization tasks (e.g., sequencing, deduction puzzles, etc.) with 80% accuracy to facilitate increased executive functioning, working memory, problem solving, and processing skills, to be achieved in 4-6 weeks.    To target thought organization: Patient was presented with four pictures and was instructed to sequence the pictures to create a common procedure (e.g. making the bed).  He completed this task in 3/3 (100%) opportunities  given max verbal cues.  It is unclear if Patient's vision had an impact on his ability to do this task.     To target thought organization: Patient was presented with one step directions (e.g. color in the sun) and was instructed to solve.  He completed this task in 3/10 (30%) opportunities independently, increasing to 10/10 (100%) opportunities given max verbal and visual cues.      5. Patient will complete concrete and abstract categorization tasks to 80% accuracy to facilitate improved generative naming skills and working memory, to be achieved in 4-6 weeks.    6. Patient will utilize word finding strategies during semantic feature analysis treatment activity for improved naming and verbal expression skills.      Plan:  -Discussed session and patient's progress with caregiver/family member after today's session.  -Continue with current plan of care.

## 2025-01-16 NOTE — PROGRESS NOTES
Daily Speech Treatment Note    Today's date: 2025   Patient’s name: Carlos Randolph  : 1940  MRN: [id number]  Safety measures: Dementia  Referring provider: Camden Pandya MD    Encounter Diagnosis     ICD-10-CM    1. Other symbolic dysfunctions  R48.8       2. Uncomplicated presenile dementia (HCC)  F03.90         POC   Expires Auth Expiration Date ST Visit Limit   2025 BOMN          Visit/Unit Tracking:  Auth Status Date 2024 2025 01/10/2025 2025   BOMN Used 1 2 3 4    Remaining 9 8 7 6        Subjective/Behavioral:  -Patient participated in all tasks presented to him today.     Objective/Assessment:  -Reviewed testing results and goals in plan care with patient. Patient is in agreement at this time.      Short Term Goals  1. Patient will be educated on the use of internal and external memory aids and compensatory strategies with 80% accuracy to facilitate increased recall of routine, personal information, and recent events, to be achieved in 4-6 weeks.    2. Patient will complete auditory immediate and short term memory tasks to 80% accuracy to facilitate increased ability to retell narratives and recall information within functional living environment, to be achieved in 4-6 weeks.    3. Patient will complete complex auditory attention processing tasks (e.g., sentence unscramble, ranking numbers/words, etc.) to improve working memory with 80% accuracy, to be achieved in 4-6 weeks.    4. Patient will facilitate planning by completing thought organization tasks (e.g., sequencing, deduction puzzles, etc.) with 80% accuracy to facilitate increased executive functioning, working memory, problem solving, and processing skills, to be achieved in 4-6 weeks.    5. Patient will complete concrete and abstract categorization tasks to 80% accuracy to facilitate improved generative naming skills and working memory, to be achieved in 4-6 weeks.    To target receptive and  expressive language: Patient was presented with a target picture card (e.g. salt) and four choices (e.g. dog, pepper, shoe, frying pan) and was instructed to choose the card that matched best.  He matched cards in 9/10 (90%) opportunities independently, increasing to 10/10 (100%) opportunities given min verbal cues.  Patient was then instructed to state the name of the word pairs (e.g. salt and pepper).  He completed this task in 10/20 (50%) opportunities independently, increasing to 14/20 (70%) opportunities given semantic cues, and increasing to 18/20 (90%) opportunities given phonemic cues.      To target receptive and expressive language: Patient was presented with three picture cards (e.g. shoes, umbrella, trash can) and was given a verbal prompt regarding object category/function (e.g. which one do you use in the rain?).  He correctly identified objects in 8/10 (80%) opportunities independently, increasing to 10/10 (100%) opportunities given min verbal cues.  He was then instructed to name items.  He completed this task in 3/10 (30%) opportunities independently, increasing to 4/10 (40%) opportunities given semantic cues, and increasing to 8/10 (80%) opportunities given phonemic cues.     Patient then attempted a similar task, but with written words, where he was given a list (e.g. baseball, soccer, tennis) and was instructed to select an answer from an array of three that fit the category (e.g. bottle, volleyball, squirrel).  He demonstrated increased difficulty with this task.  He completed task in 2/2 (100%) opportunities given max verbal and visual cues.  The rest was given for homework.     6. Patient will utilize word finding strategies during semantic feature analysis treatment activity for improved naming and verbal expression skills.      Plan:  -Discussed session and patient's progress with caregiver/family member after today's session.  -Continue with current plan of care.

## 2025-01-17 ENCOUNTER — APPOINTMENT (OUTPATIENT)
Dept: SPEECH THERAPY | Facility: CLINIC | Age: 85
End: 2025-01-17
Payer: MEDICARE

## 2025-01-17 ENCOUNTER — OFFICE VISIT (OUTPATIENT)
Dept: SPEECH THERAPY | Facility: CLINIC | Age: 85
End: 2025-01-17
Payer: MEDICARE

## 2025-01-17 DIAGNOSIS — R48.8 OTHER SYMBOLIC DYSFUNCTIONS: Primary | ICD-10-CM

## 2025-01-17 DIAGNOSIS — F03.90 UNCOMPLICATED PRESENILE DEMENTIA (HCC): ICD-10-CM

## 2025-01-17 PROCEDURE — 92507 TX SP LANG VOICE COMM INDIV: CPT

## 2025-01-24 ENCOUNTER — OFFICE VISIT (OUTPATIENT)
Dept: SPEECH THERAPY | Facility: CLINIC | Age: 85
End: 2025-01-24
Payer: MEDICARE

## 2025-01-24 DIAGNOSIS — F03.90 UNCOMPLICATED PRESENILE DEMENTIA (HCC): ICD-10-CM

## 2025-01-24 DIAGNOSIS — R48.8 OTHER SYMBOLIC DYSFUNCTIONS: Primary | ICD-10-CM

## 2025-01-24 PROCEDURE — 92507 TX SP LANG VOICE COMM INDIV: CPT

## 2025-01-24 NOTE — PROGRESS NOTES
Daily Speech Treatment Note    Today's date: 2025   Patient’s name: Carlos Randolph  : 1940  MRN: [id number]  Safety measures: Dementia  Referring provider: Camden Pandya MD    Encounter Diagnosis     ICD-10-CM    1. Other symbolic dysfunctions  R48.8       2. Uncomplicated presenile dementia (HCC)  F03.90         POC   Expires Auth Expiration Date ST Visit Limit   2025 BOMN          Visit/Unit Tracking:  Auth Status Date 2024 2025 01/10/2025 2025 2025   BOMN Used 1 2 3 4 5    Remaining 9 8 7 6 5        Subjective/Behavioral:  -Therapist provided education for caregivers regarding types of cuing and when to use/how often to cue.  Patient does require increased processing time so too many cues can be overwhelming.  It was also suggested that when Patient is thinking to allow him to engage in conversation and then gently lead him back to topic as needed.     Objective/Assessment:  -Reviewed testing results and goals in plan care with patient. Patient is in agreement at this time.      Short Term Goals  1. Patient will be educated on the use of internal and external memory aids and compensatory strategies with 80% accuracy to facilitate increased recall of routine, personal information, and recent events, to be achieved in 4-6 weeks.    2. Patient will complete auditory immediate and short term memory tasks to 80% accuracy to facilitate increased ability to retell narratives and recall information within functional living environment, to be achieved in 4-6 weeks.    3. Patient will complete complex auditory attention processing tasks (e.g., sentence unscramble, ranking numbers/words, etc.) to improve working memory with 80% accuracy, to be achieved in 4-6 weeks.    4. Patient will facilitate planning by completing thought organization tasks (e.g., sequencing, deduction puzzles, etc.) with 80% accuracy to facilitate increased executive functioning, working memory,  problem solving, and processing skills, to be achieved in 4-6 weeks.    5. Patient will complete concrete and abstract categorization tasks to 80% accuracy to facilitate improved generative naming skills and working memory, to be achieved in 4-6 weeks.    To target expressive language: Patient was presented with common categories (e.g. animals) and was instructed to name one item for each.  He completed this task in 3/10 (30%) opportunities independently, increasing to 6/10 (60%) opportunities given semantic cues, and increasing to 9/10 (90%) opportunities given phonemic cues.    6. Patient will utilize word finding strategies during semantic feature analysis treatment activity for improved naming and verbal expression skills.    To target expressive language: Patient was presented with a picture of a common item (e.g. spoon) and was instructed to name the item.  He completed this task in 4/8 (50%) opportunities independently, increasing to 7/8 (88%) opportunities given semantic cues, and increasing to 8/8 (100%) opportunities given phonemic cues.  Next, Patient was asked questions regarding the item (e.g. what is a spoon made out of?  What foods do you need to use a spoon for?).  He answered questions about objects in 5/16 (31%) opportunities independently, increasing to 10/18 (55%) opportunities given additional semantic cues, and increasing to 16/18 (89%) opportunities given phonemic cues.       Plan:  -Discussed session and patient's progress with caregiver/family member after today's session.  -Continue with current plan of care.

## 2025-01-31 ENCOUNTER — OFFICE VISIT (OUTPATIENT)
Dept: SPEECH THERAPY | Facility: CLINIC | Age: 85
End: 2025-01-31
Payer: MEDICARE

## 2025-01-31 ENCOUNTER — APPOINTMENT (OUTPATIENT)
Dept: SPEECH THERAPY | Facility: CLINIC | Age: 85
End: 2025-01-31
Payer: MEDICARE

## 2025-01-31 DIAGNOSIS — F03.90 UNCOMPLICATED PRESENILE DEMENTIA (HCC): ICD-10-CM

## 2025-01-31 DIAGNOSIS — R48.8 OTHER SYMBOLIC DYSFUNCTIONS: Primary | ICD-10-CM

## 2025-01-31 PROCEDURE — 92507 TX SP LANG VOICE COMM INDIV: CPT

## 2025-01-31 NOTE — PROGRESS NOTES
Daily Speech Treatment Note    Today's date: 2025   Patient’s name: Carlos Randolph  : 1940  MRN: [id number]  Safety measures: Dementia  Referring provider: Camden Pandya MD    Encounter Diagnosis     ICD-10-CM    1. Other symbolic dysfunctions  R48.8       2. 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     -Patient's caregiver, Almita, waited in waiting room today.  This appeared to help Patient focus.     Objective/Assessment:  -See goals and tasks listed below.      Short Term Goals  1. Patient will be educated on the use of internal and external memory aids and compensatory strategies with 80% accuracy to facilitate increased recall of routine, personal information, and recent events, to be achieved in 4-6 weeks.    2. Patient will complete auditory immediate and short term memory tasks to 80% accuracy to facilitate increased ability to retell narratives and recall information within functional living environment, to be achieved in 4-6 weeks.    3. Patient will complete complex auditory attention processing tasks (e.g., sentence unscramble, ranking numbers/words, etc.) to improve working memory with 80% accuracy, to be achieved in 4-6 weeks.    4. Patient will facilitate planning by completing thought organization tasks (e.g., sequencing, deduction puzzles, etc.) with 80% accuracy to facilitate increased executive functioning, working memory, problem solving, and processing skills, to be achieved in 4-6 weeks.    To target reading comprehension: Patient read short stories (4-5 sentences in length) and was then instructed to answer comprehension questions.  He completed this task over 3  stories, answering questions in 8/12 (67%) opportunities independently, increasing to 12/12 (100%) opportunities given mod verbal cues.  It should be noted that Patient did require consistent verbal cues to use the story to help him answers.      To target thought organization: Patient was given a set of cards with multiple patterns, colors, and numbers and was instructed to make matches based on a rule.  He matched cards according to shape in 52/54 (96%) opportunities independently, increasing to 54/54 (100%) opportunities given min verbal cues.    5. Patient will complete concrete and abstract categorization tasks to 80% accuracy to facilitate improved generative naming skills and working memory, to be achieved in 4-6 weeks.    To target expressive language: Patient was verbally presented with common phrases (e.g. salt and ____) and was instructed to give an answer.  He completed this task in 7/15 (47%) opportunities independently, increasing to 9/15 (60%) opportunities given semantic cues, and increasing to 13/15 (87%) opportunities given phonemic cues.    6. Patient will utilize word finding strategies during semantic feature analysis treatment activity for improved naming and verbal expression skills.      Plan:  -Discussed session and patient's progress with caregiver/family member after today's session.  -Continue with current plan of care.

## 2025-02-07 ENCOUNTER — OFFICE VISIT (OUTPATIENT)
Dept: SPEECH THERAPY | Facility: CLINIC | Age: 85
End: 2025-02-07
Payer: MEDICARE

## 2025-02-07 DIAGNOSIS — R48.8 OTHER SYMBOLIC DYSFUNCTIONS: Primary | ICD-10-CM

## 2025-02-07 DIAGNOSIS — F03.90 UNCOMPLICATED PRESENILE DEMENTIA (HCC): ICD-10-CM

## 2025-02-07 PROCEDURE — 92507 TX SP LANG VOICE COMM INDIV: CPT

## 2025-02-07 NOTE — PROGRESS NOTES
Daily Speech Treatment Note    Today's date: 2025   Patient’s name: Carlos Randolph  : 1940  MRN: [id number]  Safety measures: Dementia  Referring provider: Camden Pandya MD    Encounter Diagnosis     ICD-10-CM    1. Other symbolic dysfunctions  R48.8       2. 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     Objective/Assessment:  -See goals and tasks listed below.      Short Term Goals  1. Patient will be educated on the use of internal and external memory aids and compensatory strategies with 80% accuracy to facilitate increased recall of routine, personal information, and recent events, to be achieved in 4-6 weeks.    2. Patient will complete auditory immediate and short term memory tasks to 80% accuracy to facilitate increased ability to retell narratives and recall information within functional living environment, to be achieved in 4-6 weeks.    3. Patient will complete complex auditory attention processing tasks (e.g., sentence unscramble, ranking numbers/words, etc.) to improve working memory with 80% accuracy, to be achieved in 4-6 weeks.    4. Patient will facilitate planning by completing thought organization tasks (e.g., sequencing, deduction puzzles, etc.) with 80% accuracy to facilitate increased executive functioning, working memory, problem solving, and processing skills, to be achieved in 4-6 weeks.    5. Patient will complete concrete and abstract categorization tasks to 80% accuracy to facilitate improved generative naming skills and working memory, to be achieved in 4-6 weeks.    To target receptive language skills: Patient was presented with a target word (e.g. corn) and six categories (e.g. weather conditions,  languages, vegetables, things that fly, numbers, and flavors) and was instructed to sort the target words into the appropriate group.  He completed this task in 11/15 (73%) opportunities independently, increasing to 15/15 (100%) opportunities given mod verbal cues.    6. Patient will utilize word finding strategies during semantic feature analysis treatment activity for improved naming and verbal expression skills.    To target expressive language: Patient was presented with clues (e.g. a vegetable that grows underground) and was instructed to state an answer (e.g. carrot, potato, radish).  He completed this task in 2/5 (40%) opportunities given max semantic cues, and increasing to 4/5 (80%) opportunities given phonemic cues.      Plan:  -Discussed session and patient's progress with caregiver/family member after today's session.  -Continue with current plan of care.

## 2025-02-14 ENCOUNTER — OFFICE VISIT (OUTPATIENT)
Dept: SPEECH THERAPY | Facility: CLINIC | Age: 85
End: 2025-02-14
Payer: MEDICARE

## 2025-02-14 DIAGNOSIS — F03.90 UNCOMPLICATED PRESENILE DEMENTIA (HCC): ICD-10-CM

## 2025-02-14 DIAGNOSIS — R48.8 OTHER SYMBOLIC DYSFUNCTIONS: Primary | ICD-10-CM

## 2025-02-14 PROCEDURE — 92507 TX SP LANG VOICE COMM INDIV: CPT

## 2025-02-14 NOTE — PROGRESS NOTES
Daily Speech Treatment Note    Today's date: 2025   Patient’s name: Carlos Randolph  : 1940  MRN: [id number]  Safety measures: Dementia  Referring provider: Camden Pandya MD    Encounter Diagnosis     ICD-10-CM    1. Other symbolic dysfunctions  R48.8       2. 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    Objective/Assessment:  -See goals and tasks listed below.      Short Term Goals  1. Patient will be educated on the use of internal and external memory aids and compensatory strategies with 80% accuracy to facilitate increased recall of routine, personal information, and recent events, to be achieved in 4-6 weeks.    2. Patient will complete auditory immediate and short term memory tasks to 80% accuracy to facilitate increased ability to retell narratives and recall information within functional living environment, to be achieved in 4-6 weeks.    3. Patient will complete complex auditory attention processing tasks (e.g., sentence unscramble, ranking numbers/words, etc.) to improve working memory with 80% accuracy, to be achieved in 4-6 weeks.    4. Patient will facilitate planning by completing thought organization tasks (e.g., sequencing, deduction puzzles, etc.) with 80% accuracy to facilitate increased executive functioning, working memory, problem solving, and processing skills, to be achieved in 4-6 weeks.    To target direction following: Patient was presented with a statement (e.g. [place] the animal) and an array of four words (e.g. shoe, pencil, dog, wrist) was instructed to complete each task.  He completed this task in 6/8 (75%) opportunities independently, increasing to 8/8 (100%) given mod verbal  cues.      5. Patient will complete concrete and abstract categorization tasks to 80% accuracy to facilitate improved generative naming skills and working memory, to be achieved in 4-6 weeks.    6. Patient will utilize word finding strategies during semantic feature analysis treatment activity for improved naming and verbal expression skills.    To target expressive language: patient was shown picture of common objects (e.g. toothbrush, keys, comb) and was instructed to name the item, give a description, and demonstrate how to use it.  He named items in 7/10 (70%) opportunities independently, increasing to 8/10 (80%) opportunities given phonemic cues.  He provided descriptions (e.g. toothbrush - I have it in my bathroom) in 7/10 (70%) opportunities independently, increasing to 10/10 (100%) opportunities given semantic cues.  He acted out the action for objects in 10/10 (100%) opportunities independently.       Plan:  -Discussed session and patient's progress with caregiver/family member after today's session.  -Continue with current plan of care.

## 2025-02-17 ENCOUNTER — TELEPHONE (OUTPATIENT)
Age: 85
End: 2025-02-17

## 2025-02-17 NOTE — TELEPHONE ENCOUNTER
Provider is not in office on 6/20 so I r/s appointment to 5/27 @ 9:30. I left message with patient's daughter if that appointment doesn't work to give us a call back.

## 2025-02-21 ENCOUNTER — OFFICE VISIT (OUTPATIENT)
Dept: SPEECH THERAPY | Facility: CLINIC | Age: 85
End: 2025-02-21
Payer: MEDICARE

## 2025-02-21 DIAGNOSIS — R48.8 OTHER SYMBOLIC DYSFUNCTIONS: Primary | ICD-10-CM

## 2025-02-21 DIAGNOSIS — F03.90 UNCOMPLICATED PRESENILE DEMENTIA (HCC): ICD-10-CM

## 2025-02-21 PROCEDURE — 92507 TX SP LANG VOICE COMM INDIV: CPT

## 2025-02-21 NOTE — PROGRESS NOTES
Daily Speech Treatment Note    Today's date: 2025   Patient’s name: Carlos Randolph  : 1940  MRN: [id number]  Safety measures: Dementia  Referring provider: Camden Pandya MD    Encounter Diagnosis     ICD-10-CM    1. Other symbolic dysfunctions  R48.8       2. 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     Objective/Assessment:  -See goals and tasks listed below.      Short Term Goals  1. Patient will be educated on the use of internal and external memory aids and compensatory strategies with 80% accuracy to facilitate increased recall of routine, personal information, and recent events, to be achieved in 4-6 weeks.    2. Patient will complete auditory immediate and short term memory tasks to 80% accuracy to facilitate increased ability to retell narratives and recall information within functional living environment, to be achieved in 4-6 weeks.    3. Patient will complete complex auditory attention processing tasks (e.g., sentence unscramble, ranking numbers/words, etc.) to improve working memory with 80% accuracy, to be achieved in 4-6 weeks.    4. Patient will facilitate planning by completing thought organization tasks (e.g., sequencing, deduction puzzles, etc.) with 80% accuracy to facilitate increased executive functioning, working memory, problem solving, and processing skills, to be achieved in 4-6 weeks.    5. Patient will complete concrete and abstract categorization tasks to 80% accuracy to facilitate improved generative naming skills and working memory, to be achieved in 4-6 weeks.    To target receptive language skills: Patient was presented with a target  word (e.g. corn) and six categories (e.g. weather conditions, languages, vegetables, things that fly, numbers, and flavors) and was instructed to sort the target words into the appropriate group.  He completed this task in 12/15 (80%) opportunities independently, increasing to 14/15 (93%) opportunities given mod verbal cues.  It should be noted that the first three trials were extremely difficult for Patient, but he spontaneously demonstrated understanding of task after these trials and completed task with little processing time.    To target receptive language skills: Patient was presented with 15 photos and was instructed to selected the photos that most align with a particular color (e.g. pink - bubble gum, piggy bank, flowers, flamingo, hair bow).  He identified items correctly in 9/10 (90%) opportunities independently, increasing to 10/10 (100%) opportunities given min verbal cues.     To target expressive language: Patient was then asked to name the items in the pictures from task above.  He completed this task in 6/10 (60%) opportunities independently, increasing to 9/10 (90%) opportunities given phonemic cues.     6. Patient will utilize word finding strategies during semantic feature analysis treatment activity for improved naming and verbal expression skills.      Plan:  -Discussed session and patient's progress with caregiver/family member after today's session.  -Continue with current plan of care.

## 2025-02-28 ENCOUNTER — EVALUATION (OUTPATIENT)
Dept: SPEECH THERAPY | Facility: CLINIC | Age: 85
End: 2025-02-28
Payer: MEDICARE

## 2025-02-28 DIAGNOSIS — R48.8 OTHER SYMBOLIC DYSFUNCTIONS: Primary | ICD-10-CM

## 2025-02-28 DIAGNOSIS — F03.90 UNCOMPLICATED PRESENILE DEMENTIA (HCC): ICD-10-CM

## 2025-02-28 PROCEDURE — 92523 SPEECH SOUND LANG COMPREHEN: CPT

## 2025-02-28 NOTE — LETTER
2025    Camden Pandya MD  [address] [id number]    Patient: Carlos Randolph   YOB: 1940   Date of Visit: 2025     Encounter Diagnosis     ICD-10-CM    1. Other symbolic dysfunctions  R48.8       2. 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 Pandya:    Thank you for your recent referral of Carlos Randolph. Please review the attached evaluation summary from Carlos's recent visit.     Please verify that you agree with the plan of care by signing the attached order.     If you have any questions or concerns, please do not hesitate to call.     I sincerely appreciate the opportunity to share in the care of one of your patients and hope to have another opportunity to work with you in the near future.     Sincerely,    Sheryl Sanchez, SLP      Referring Provider:     Based upon review of the patient's progress and continued therapy plan, it is my medical opinion that Carlos Randolph should continue speech therapy treatment at the Physical Therapy at [address]:                    Camden Padnya MD  [address] [address]  Via Fax: [phone]        Speech-Language Pathology Re-Evaluation    Today's date: 2025   Patient’s name: Carlos Randolph  : 1940  MRN: [id number]  Safety measures: Dementia  Referring provider: Camden Pandya MD    Encounter Diagnosis     ICD-10-CM    1. Other symbolic dysfunctions  R48.8       2. 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  During testing today, Patient demonstrated strengths with confrontational naming, repetition of words and numbers, and visuo-spatial skills (e.g. figure copy and clock drawing).  Patient demonstrated  "difficulty with temporal and spatial orientation, auditory attention, auditory and visual memory, thought organization, abstraction, and generative naming.  Over this past certification period, Patient has participated in a variety of memory and word finding tasks.  Patient has made some progress with word finding tasks where he had started with mostly receptive language tasks, but can now complete expressive language tasks (e.g. naming common items).  It is recommended that Patient's therapy continue to focus on word finding as this is what he is most impacted by on a day to day basis.  Although Patient has moments where he is verbose, if he is redirected to the task, he is able to realign his thoughts and have better word finding.  Caregivers have been encouraged to let him \"think out loud\" when doing different language tasks at home.     Patient is being transitioned to skilled maintenance. Goals are being adjusted to reflect this change in treatment focus and to target education/strategies and cognitive training therapy to decrease caregiver burden, promote patient independence, and overall QOL. This patient will require a therapist’s skill to carry out these interventions because of the following condition(s): Dementia (Alzheimer's type).       Short Term Goals  1. Patient will be educated on the use of internal and external memory aids and compensatory strategies with 80% accuracy to facilitate increased recall of routine, personal information, and recent events, to be achieved in 4-6 weeks. --MET    2. Patient will complete auditory immediate and short term memory tasks to 80% accuracy to facilitate increased ability to retell narratives and recall information within functional living environment, to be achieved in 4-6 weeks. --NOT MET    3. Patient will complete complex auditory attention processing tasks (e.g., sentence unscramble, ranking numbers/words, etc.) to improve working memory with 80% accuracy, to " be achieved in 4-6 weeks. --NOT MET    4. Patient will facilitate planning by completing thought organization tasks (e.g., sequencing, deduction puzzles, etc.) with 80% accuracy to facilitate increased executive functioning, working memory, problem solving, and processing skills, to be achieved in 4-6 weeks. --NOT MET    5. Patient will complete concrete and abstract categorization tasks to 80% accuracy to facilitate improved generative naming skills and working memory, to be achieved in 4-6 weeks. --NOT MET    6. Patient will utilize word finding strategies during semantic feature analysis treatment activity for improved naming and verbal expression skills. --NOT MET    NEW GOALS:   7. Patient will complete word generation tasks (e.g., categorical naming, sentence/phrase completion, word deduction, definitions, etc.) at 75% accuracy with min semantic and phonemic cues from clinician as needed to target the maintenance of his/her expressive vocabulary, as well as promote socialization and safety.    8. Patient will complete cognitive-linguistic activities (e.g., verbal and visual problem solving scenarios, sequencing, reasoning, etc.) at 75% accuracy with min semantic cues from clinician as needed to target the maintenance of his/her cognitive-linguistic functioning, as well as promote safety and overall QOL.      Long Term Goals  1. Patient will demonstrate cognitive-communication skills consistent with age and education given use of compensatory strategies when needed to resume baseline activities and responsibilities in home, community, and work/school settings by discharge. --NOT MET    2. Patient will complete cognitive-linguistic therapy that addresses patient’s specific deficits in processing speed, short-term working memory, attention to detail, monitoring, sequencing, and organization skills, with instruction, to alleviate effects of executive functioning disorder deficits by discharge. --PARTIALLY  "MET    3. Patient will complete higher-level expressive language tasks (e.g., word definitions, idioms, synonym/antonyms, etc) with 80% accuracy to improve functional communication skills by discharge. --NOT MET    NEW GOALS:   4. Patient will maintain his/her highest level of independence with her current cognitive-linguistic functioning to meet her needs with the implementation of compensatory strategies to promote positive communication interactions and socialization, participation in meaningful activities, safety, and quality of life (to be achieved by discharge).      Plan:  Patient would benefit from outpatient skilled Speech Therapy services: Cognitive-linguistic therapy and Maintenance therapy program    Frequency: 1x weekly  Duration: 3 months    Intervention certification from: 2/28/2025  Intervention certification to: 05/28/2025      Subjective:  -Patient was joined by his daughter, Pat, and his caregiver, Almita today.     Patient's goal(s): \"To get better again.\"     Therapist did discuss maintenance program with Patient's daughter and she is understanding that progress is not expected.  Her goal is to maintain current level of functioning.      Pain: Absent (scale:  N/A )      Objective (testing):  The Brief Cognitive Assessment Test (BCAT) is a multi-domain cognitive tool that assesses a patient’s orientation, verbal recall, visual recognition, visual recall, attention, abstraction, language, executive functions, and visuospatial processing. BCAT is sensitive to the full spectrum of cognitive functioning (normal, MCI, mild dementia, moderate-severe dementia) and can predict basic and instrumental activities of daily living (ADL, IADL).     During today’s administration of the test, patient achieved a total score of 9/50 points.     Using the BCAT Crosswalk Table as a reference, patient’s score falls within the range and may be suggestive of \"Moderate to Severe Dementia\". The Crosswalk Table " suggests the following:      Cognitive Stage: Moderate to Severe Dementia (BCAT Range: 0-25 // MMSE: 0-18 // GDS: 5-7)  Cognitive & Functional Issues: Moderate (upper end of range) - Pervasive functional deficits (IADLs), but ADLs generally intact; marked deficits in memory and executive functions; behavioral and psychological symptoms are common; requires significant residential support. // Severe (lower end of range) - Needs assistance in ADLs/IADLs; pervasive cognitive deficits; requires complex care.       Treatment:  -No treatment was performed on this date of service.       Visit Tracking:  POC   Expires Auth Expiration Date ST Visit Limit   03/18/2025 12/31/2025 BOMN   05/28/2025 12/31/2025 BOMN     Visit/Unit Tracking:  Auth Status Date 02/07/2025 02/14/2025 02/21/2025 02/28/2025     BOMN Used 7 8 9 10      Remaining 3 2 1 0         Intervention comments:  45 minutes test administration

## 2025-02-28 NOTE — PROGRESS NOTES
"Speech-Language Pathology Re-Evaluation    Today's date: 2025   Patient’s name: Carlos Randolph  : 1940  MRN: [id number]  Safety measures: Dementia  Referring provider: Camden Pandya MD    Encounter Diagnosis     ICD-10-CM    1. Other symbolic dysfunctions  R48.8       2. 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  During testing today, Patient demonstrated strengths with confrontational naming, repetition of words and numbers, and visuo-spatial skills (e.g. figure copy and clock drawing).  Patient demonstrated difficulty with temporal and spatial orientation, auditory attention, auditory and visual memory, thought organization, abstraction, and generative naming.  Over this past certification period, Patient has participated in a variety of memory and word finding tasks.  Patient has made some progress with word finding tasks where he had started with mostly receptive language tasks, but can now complete expressive language tasks (e.g. naming common items).  It is recommended that Patient's therapy continue to focus on word finding as this is what he is most impacted by on a day to day basis.  Although Patient has moments where he is verbose, if he is redirected to the task, he is able to realign his thoughts and have better word finding.  Caregivers have been encouraged to let him \"think out loud\" when doing different language tasks at home.     Patient is being transitioned to skilled maintenance. Goals are being adjusted to reflect this change in treatment focus and to target education/strategies and cognitive training therapy to decrease caregiver burden, promote patient independence, and overall QOL. This patient will require a " therapist’s skill to carry out these interventions because of the following condition(s): Dementia (Alzheimer's type).       Short Term Goals  1. Patient will be educated on the use of internal and external memory aids and compensatory strategies with 80% accuracy to facilitate increased recall of routine, personal information, and recent events, to be achieved in 4-6 weeks. --MET    2. Patient will complete auditory immediate and short term memory tasks to 80% accuracy to facilitate increased ability to retell narratives and recall information within functional living environment, to be achieved in 4-6 weeks. --NOT MET    3. Patient will complete complex auditory attention processing tasks (e.g., sentence unscramble, ranking numbers/words, etc.) to improve working memory with 80% accuracy, to be achieved in 4-6 weeks. --NOT MET    4. Patient will facilitate planning by completing thought organization tasks (e.g., sequencing, deduction puzzles, etc.) with 80% accuracy to facilitate increased executive functioning, working memory, problem solving, and processing skills, to be achieved in 4-6 weeks. --NOT MET    5. Patient will complete concrete and abstract categorization tasks to 80% accuracy to facilitate improved generative naming skills and working memory, to be achieved in 4-6 weeks. --NOT MET    6. Patient will utilize word finding strategies during semantic feature analysis treatment activity for improved naming and verbal expression skills. --NOT MET    NEW GOALS:   7. Patient will complete word generation tasks (e.g., categorical naming, sentence/phrase completion, word deduction, definitions, etc.) at 75% accuracy with min semantic and phonemic cues from clinician as needed to target the maintenance of his/her expressive vocabulary, as well as promote socialization and safety.    8. Patient will complete cognitive-linguistic activities (e.g., verbal and visual problem solving scenarios, sequencing,  "reasoning, etc.) at 75% accuracy with min semantic cues from clinician as needed to target the maintenance of his/her cognitive-linguistic functioning, as well as promote safety and overall QOL.      Long Term Goals  1. Patient will demonstrate cognitive-communication skills consistent with age and education given use of compensatory strategies when needed to resume baseline activities and responsibilities in home, community, and work/school settings by discharge. --NOT MET    2. Patient will complete cognitive-linguistic therapy that addresses patient’s specific deficits in processing speed, short-term working memory, attention to detail, monitoring, sequencing, and organization skills, with instruction, to alleviate effects of executive functioning disorder deficits by discharge. --PARTIALLY MET    3. Patient will complete higher-level expressive language tasks (e.g., word definitions, idioms, synonym/antonyms, etc) with 80% accuracy to improve functional communication skills by discharge. --NOT MET    NEW GOALS:   4. Patient will maintain his/her highest level of independence with her current cognitive-linguistic functioning to meet her needs with the implementation of compensatory strategies to promote positive communication interactions and socialization, participation in meaningful activities, safety, and quality of life (to be achieved by discharge).      Plan:  Patient would benefit from outpatient skilled Speech Therapy services: Cognitive-linguistic therapy and Maintenance therapy program    Frequency: 1x weekly  Duration: 3 months    Intervention certification from: 2/28/2025  Intervention certification to: 05/28/2025      Subjective:  -Patient was joined by his daughter, Pat, and his caregiver, Almita today.     Patient's goal(s): \"To get better again.\"     Therapist did discuss maintenance program with Patient's daughter and she is understanding that progress is not expected.  Her goal is to maintain " "current level of functioning.      Pain: Absent (scale:  N/A )      Objective (testing):  The Brief Cognitive Assessment Test (BCAT) is a multi-domain cognitive tool that assesses a patient’s orientation, verbal recall, visual recognition, visual recall, attention, abstraction, language, executive functions, and visuospatial processing. BCAT is sensitive to the full spectrum of cognitive functioning (normal, MCI, mild dementia, moderate-severe dementia) and can predict basic and instrumental activities of daily living (ADL, IADL).     During today’s administration of the test, patient achieved a total score of 9/50 points.     Using the BCAT Crosswalk Table as a reference, patient’s score falls within the range and may be suggestive of \"Moderate to Severe Dementia\". The Crosswalk Table suggests the following:      Cognitive Stage: Moderate to Severe Dementia (BCAT Range: 0-25 // MMSE: 0-18 // GDS: 5-7)  Cognitive & Functional Issues: Moderate (upper end of range) - Pervasive functional deficits (IADLs), but ADLs generally intact; marked deficits in memory and executive functions; behavioral and psychological symptoms are common; requires significant residential support. // Severe (lower end of range) - Needs assistance in ADLs/IADLs; pervasive cognitive deficits; requires complex care.       Treatment:  -No treatment was performed on this date of service.       Visit Tracking:  POC   Expires Auth Expiration Date ST Visit Limit   03/18/2025 12/31/2025 BOMN   05/28/2025 12/31/2025 BLAYNE     Visit/Unit Tracking:  Auth Status Date 02/07/2025 02/14/2025 02/21/2025 02/28/2025     BLAYNE Used 7 8 9 10      Remaining 3 2 1 0         Intervention comments:  45 minutes test administration   "

## 2025-03-07 ENCOUNTER — OFFICE VISIT (OUTPATIENT)
Dept: SPEECH THERAPY | Facility: CLINIC | Age: 85
End: 2025-03-07
Payer: MEDICARE

## 2025-03-07 DIAGNOSIS — R48.8 OTHER SYMBOLIC DYSFUNCTIONS: Primary | ICD-10-CM

## 2025-03-07 DIAGNOSIS — F03.90 UNCOMPLICATED PRESENILE DEMENTIA (HCC): ICD-10-CM

## 2025-03-07 PROCEDURE — 92507 TX SP LANG VOICE COMM INDIV: CPT

## 2025-03-07 NOTE — PROGRESS NOTES
"Daily Speech Treatment Note    Today's date: 3/7/2025   Patient’s name: Carlos Randolph  : 1940  MRN: [id number]  Safety measures: Dementia  Referring provider: Camden Pandya MD    Encounter Diagnosis     ICD-10-CM    1. Other symbolic dysfunctions  R48.8       2. 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  He stated he is \"okay\" today.     Objective/Assessment:  -Patient's family member/caregiver was present during today's session.      Short-term goals:  7. Patient will complete word generation tasks (e.g., categorical naming, sentence/phrase completion, word deduction, definitions, etc.) at 75% accuracy with min semantic and phonemic cues from clinician as needed to target the maintenance of his/her expressive vocabulary, as well as promote socialization and safety.    To target receptive and expressive language: Patient was presented with an array of four picture cards, all showing articles of clothing.  He was instructed to identify a picture based on Clinician description (e.g. which one is worn on the head).  He identified pictures in 9/10 (90%) opportunities independently, increasing to 10/10 (100%) opportunities given min semantic cues.  He was then instructed to state the name of each clothing item.  He named items in 2/10 (20%) opportunities independently, increasing to 3/10 (30%) opportunities given semantic cues, and increasing to 7/10 (70%) opportunities given phonemic cues.    To target categorization: Patient was presented with a list of 4 words and was instructed to state which word did not belong.  He completed this task in 4/8 (50%) opportunities " independently, increasing to 8/8 (100%) opportunities given mod verbal cues.    8. Patient will complete cognitive-linguistic activities (e.g., verbal and visual problem solving scenarios, sequencing, reasoning, etc.) at 75% accuracy with min semantic cues from clinician as needed to target the maintenance of his/her cognitive-linguistic functioning, as well as promote safety and overall QOL.      Plan:  -Continue with current plan of care.

## 2025-03-14 ENCOUNTER — OFFICE VISIT (OUTPATIENT)
Dept: URGENT CARE | Facility: CLINIC | Age: 85
End: 2025-03-14
Payer: MEDICARE

## 2025-03-14 ENCOUNTER — OFFICE VISIT (OUTPATIENT)
Dept: SPEECH THERAPY | Facility: CLINIC | Age: 85
End: 2025-03-14
Payer: MEDICARE

## 2025-03-14 VITALS
DIASTOLIC BLOOD PRESSURE: 74 MMHG | SYSTOLIC BLOOD PRESSURE: 134 MMHG | TEMPERATURE: 98.1 F | BODY MASS INDEX: 29.1 KG/M2 | HEART RATE: 82 BPM | RESPIRATION RATE: 16 BRPM | WEIGHT: 149 LBS | OXYGEN SATURATION: 96 %

## 2025-03-14 DIAGNOSIS — R48.8 OTHER SYMBOLIC DYSFUNCTIONS: Primary | ICD-10-CM

## 2025-03-14 DIAGNOSIS — M25.561 ACUTE PAIN OF RIGHT KNEE: Primary | ICD-10-CM

## 2025-03-14 DIAGNOSIS — F03.90 UNCOMPLICATED PRESENILE DEMENTIA (HCC): ICD-10-CM

## 2025-03-14 PROCEDURE — 92507 TX SP LANG VOICE COMM INDIV: CPT

## 2025-03-14 PROCEDURE — 99213 OFFICE O/P EST LOW 20 MIN: CPT | Performed by: PHYSICIAN ASSISTANT

## 2025-03-14 PROCEDURE — G0463 HOSPITAL OUTPT CLINIC VISIT: HCPCS | Performed by: PHYSICIAN ASSISTANT

## 2025-03-14 NOTE — PATIENT INSTRUCTIONS
"Patient Education      Patient is exhibiting right medial knee pain pain over bony surface.  I suspect arthritic knee pain.  Wells score of 1  Continue with diclofenac 3 times daily  Tylenol 500 mg 1 tablet every 6-8 hours as needed for additional pain relief  Follow up with PCP in 3-5 days.  Proceed to  ER if symptoms worsen.  If tests are performed, our office will contact you with results only if changes need to made to the care plan discussed with you at the visit. You can review your full results on [facility].       Knee pain   The Basics   Written by the doctors and editors at [facility]   What causes knee pain? -- Many different conditions can cause knee pain. Some of the most common are listed below.   Bending or using the knee too much - This can cause pain in the front of the knee that worsens with running, climbing steps, or sitting for a long time.   Arthritis - Arthritis is a general term that means inflammation of the joints. There are lots of types of arthritis. The most common type, called osteoarthritis, often comes with age. It can cause pain, stiffness, and swelling (figure 1).   Bursitis - Bursitis happens when fluid-filled sacs around the knee (called \"bursae\") get irritated or swollen (figure 2). Bursitis can cause pain and swelling.   A collection of fluid in the knee - This can happen after a knee injury.   A tear in the meniscus - The meniscus is a cushion of rubbery material (cartilage) between the thigh bone and the leg bone (figure 3).   A tear in a ligament - Ligaments are bands of tissue that connect 1 bone to another. There are 4 ligaments in each knee (figure 3).   Muscle strain - Different leg muscles move the knee joint, causing the knee to bend and straighten. If 1 of these muscles or its tendon doesn't work well, moving the knee can cause pain. (Tendons are bands of tissue the connect muscles to bones.)   Other knee injuries, a knee joint infection, or a condition called " "gout, which causes crystals to form inside joints   Conditions that don't involve the knee - For example, problems in the hip can sometimes cause knee pain.  Is there anything I can do on my own to feel better? -- Yes. To ease your symptoms, you can:   Put ice on the knee to reduce pain and swelling - For the first few weeks after an injury, or after an activity that makes your pain worse, you can try icing your knee. Put a cold gel pack, bag of ice, or bag of frozen vegetables on the injured area every 1 to 2 hours, for 15 minutes each time. Put a thin towel between the ice (or other cold object) and your skin. To reduce swelling, sit or lie down and raise your leg above the level of your heart when you put ice on it.   Rest your knee and avoid movements that worsen the pain - Try not to squat, kneel, or run. Also, don't use exercise machines, such as stair steppers or rowing machines. Instead, you can walk or swim (the front and back crawl strokes) for exercise.   Take a pain-relieving medicine, such as acetaminophen (sample brand name: Tylenol) or ibuprofen (sample brand names: Advil, Motrin).  Should I see a doctor or nurse? -- See your doctor or nurse if:   You are unable to put weight on your knee, your knee \"locks\" in place, or your knee \"gives out\"   Your knee is very swollen and painful   You have a fever with knee pain, swelling, and redness   Your knee pain doesn't get better or gets worse after you treat it on your own for a few days  How is knee pain treated? -- The right treatment for knee pain depends on what is causing it. Treatments might include:   Wearing a knee brace or shoe insert   Doing exercises to strengthen and stretch the muscles that move the knee joint - Ask your doctor or nurse which exercises can help with the cause of your pain.   Having physical therapy   Losing weight, if needed - Talk to your doctor or nurse about whether losing weight might help your knee pain. They can help you " "lose weight in a healthy way.   Getting a shot of medicine in the knee   Other medicines   Surgery  All topics are updated as new evidence becomes available and our peer review process is complete.  This topic retrieved from [employer] on: Apr 25, 2024.  Topic 70991 Version 15.0  Release: 32.4.2 - C32.114  © 2024 [employer] and/or its affiliates. All rights reserved.  figure 1: Knee osteoarthritis     This drawing shows a normal knee joint next to a knee joint with osteoarthritis. In the osteoarthritis joint, the cartilage covering the ends of the bones roughens and becomes thin, while the bone underneath the cartilage grows thicker. Bony growths called \"osteophytes\" can form. The space between the bones also becomes narrower.  Graphic 967570 Version 3.0  figure 2: Knee bursa (prepatellar bursa)     Graphic 93640 Version 3.0  figure 3: Front view of the knee     This drawing shows the inner parts of the knee as seen from the front. A small bone (called the patella or the \"knee cap\") that sits in front of the knee has been removed so that you can see what is under that bone. The anterior cruciate ligament (ACL) is in the middle in white. It connects the thigh bone (called the \"femur\") to the shin bone (called the \"tibia\"). The meniscus is a cushion of rubbery material (cartilage) between the thigh bone and the shin bone.  Graphic 94680 Version 5.0  Consumer Information Use and Disclaimer   Disclaimer: This generalized information is a limited summary of diagnosis, treatment, and/or medication information. It is not meant to be comprehensive and should be used as a tool to help the user understand and/or assess potential diagnostic and treatment options. It does NOT include all information about conditions, treatments, medications, side effects, or risks that may apply to a specific patient. It is not intended to be medical advice or a substitute for the medical advice, diagnosis, or treatment of a health care " provider based on the health care provider's examination and assessment of a patient's specific and unique circumstances. Patients must speak with a health care provider for complete information about their health, medical questions, and treatment options, including any risks or benefits regarding use of medications. This information does not endorse any treatments or medications as safe, effective, or approved for treating a specific patient. [employer] Inc[employer] and its affiliates disclaim any warranty or liability relating to this information or the use thereof.The use of this information is governed by the Terms of Use, available at https://www.[employer]com/en/know/clinical-effectiveness-terms. 2024© UpToDate, Inc. and its affiliates and/or licensors. All rights reserved.  Copyright   © 2024 [employer]Date, Inc. and/or its affiliates. All rights reserved.

## 2025-03-14 NOTE — PROGRESS NOTES
[facility]        NAME: Carlos Randolph is a 85 y.o. male  : 1940    MRN: [id number]  DATE: 2025  TIME: 6:48 PM    Assessment and Plan   Acute pain of right knee [M25.561]  1. Acute pain of right knee              Patient Instructions     MDM:  Patient is exhibiting right medial knee pain pain over bony surface.  I suspect arthritic knee pain.    Wells score of 1  Continue with diclofenac 3 times daily  Tylenol 500 mg 1 tablet every 6-8 hours as needed for additional pain relief    Follow up with PCP in 3-5 days.  Proceed to  ER if symptoms worsen.    If tests are performed, our office will contact you with results only if changes need to made to the care plan discussed with you at the visit. You can review your full [facility] on [facility].    Chief Complaint     Chief Complaint   Patient presents with    Leg Pain         History of Present Illness       85-year-old male with past medical history of PAD CAD DVT presents today with complaining of right medial knee pain. Care taken concern for DVT since Pt. was recently weaned off apixaban. Care giver administering diclofenac cream TID.         Review of Systems   Review of Systems   Constitutional:  Negative for activity change, appetite change, chills and fever.   Eyes:  Negative for visual disturbance.   Respiratory:  Negative for cough, chest tightness and wheezing.    Cardiovascular:  Negative for chest pain and palpitations.   Gastrointestinal:  Negative for abdominal pain, nausea and vomiting.   Skin:  Negative for rash and wound.   Neurological:  Negative for dizziness, light-headedness and headaches.         Current Medications       Current Outpatient Medications:     apixaban (ELIQUIS) 5 mg, Take 1 tablet (5 mg total) by mouth 2 (two) times a day, Disp: 80 tablet, Rfl: 0    Apoaequorin (PREVAGEN PO), Take by mouth (Patient not taking: Reported on 2024), Disp: , Rfl:     aspirin 81 mg chewable tablet, Chew 1 tablet  (81 mg total) daily, Disp: 30 tablet, Rfl: 11    atorvastatin (LIPITOR) 40 mg tablet, Take 1 tablet (40 mg total) by mouth every evening, Disp: 90 tablet, Rfl: 2    carvedilol (COREG) 6.25 mg tablet, Take 1 tablet (6.25 mg total) by mouth every 12 (twelve) hours (Patient not taking: Reported on 12/29/2024), Disp: 60 tablet, Rfl: 4    famotidine (PEPCID) 40 MG tablet, Take 1 tablet (40 mg total) by mouth daily, Disp: 90 tablet, Rfl: 1    FOLBIC 2.5-25-2 MG, Take 1 tablet by mouth daily, Disp: , Rfl: 4    isosorbide mononitrate (IMDUR) 30 mg 24 hr tablet, Take 1 tablet by mouth once daily, Disp: 30 tablet, Rfl: 0    niacin (NIASPAN) 1000 MG CR tablet, daily (Patient not taking: Reported on 12/29/2024), Disp: , Rfl: 3    nitroglycerin (NITROSTAT) 0.4 mg SL tablet, Place 1 tablet (0.4 mg total) under the tongue every 5 (five) minutes as needed for chest pain, Disp: 25 tablet, Rfl: 11    omega-3-acid ethyl esters (LOVAZA) 1 g capsule, Take 2 g by mouth 2 (two) times a day, Disp: , Rfl:     omeprazole (PriLOSEC) 40 MG capsule, Take 40 mg by mouth daily, Disp: , Rfl:     PARoxetine (PAXIL) 20 mg tablet, Take 1 tablet by mouth in the morning, Disp: , Rfl:     polyethylene glycol (MIRALAX) 17 g packet, Take 17 g by mouth daily as needed (constipation) (Patient not taking: Reported on 12/29/2024), Disp: 30 each, Rfl: 0    Vitamin E 500 UNIT/GM POWD, Use, Disp: , Rfl:     Current Allergies     Allergies as of 03/14/2025    (No Known Allergies)            The following portions of the patient's history were reviewed and updated as appropriate: allergies, current medications, past family history, past medical history, past social history, past surgical history and problem list.     Past Medical History:   Diagnosis Date    Carotid artery disease (HCC)     Claudication in peripheral vascular disease (HCC)     High cholesterol        Past Surgical History:   Procedure Laterality Date    BYPASS GRAFT  1996    CAROTID STENT  2012     COLONOSCOPY      CORONARY ARTERY BYPASS GRAFT  1995    IR AORTAGRAM WITH RUN-OFF  7/2/2020    [place] SLCTV CATHJ 3RD+ ORD SLCTV ABDL PEL/LXTR BRNCH Right 7/2/2020    Procedure: ARTERIOGRAM, RIGHT LEG ANGIOGRAM, BALLOON ANGIOPLASTY AND STENTING OF RIGHT SFA;  Surgeon: Brigitte Platt MD;  Location: [facility];  Service: Vascular    VASCULAR SURGERY         Family History   Problem Relation Age of Onset    Hypertension Family     Heart disease Family     Stroke Family     No Known Problems Mother     No Known Problems Father          Medications have been verified.        Objective   /74   Pulse 82   Temp 98.1 °F (36.7 °C)   Resp 16   Wt 67.6 kg (149 lb)   SpO2 96%   BMI 29.10 kg/m²        Physical Exam     Physical Exam  Vitals and nursing note reviewed.   Constitutional:       General: He is not in acute distress.     Appearance: Normal appearance. He is normal weight.   Eyes:      General: No scleral icterus.     Extraocular Movements: Extraocular movements intact.      Conjunctiva/sclera: Conjunctivae normal.      Pupils: Pupils are equal, round, and reactive to light.   Cardiovascular:      Rate and Rhythm: Normal rate and regular rhythm.      Pulses: Normal pulses.           Radial pulses are 2+ on the right side and 2+ on the left side.        Popliteal pulses are 2+ on the right side and 2+ on the left side.      Heart sounds: Normal heart sounds.   Pulmonary:      Effort: Pulmonary effort is normal. No respiratory distress.      Breath sounds: Normal breath sounds.   Musculoskeletal:         General: Normal range of motion.      Cervical back: Normal range of motion and neck supple.      Right knee: Bony tenderness present. No swelling, deformity, effusion, erythema, ecchymosis, lacerations or crepitus. Normal range of motion. Tenderness present over the MCL. No medial joint line, lateral joint line, LCL, ACL, PCL or patellar tendon tenderness. No LCL laxity, MCL laxity, ACL laxity or PCL laxity.  Normal alignment. Normal pulse.      Left knee: Normal.   Skin:     General: Skin is warm and dry.      Findings: No bruising, erythema or lesion.   Neurological:      Mental Status: He is alert.   Psychiatric:         Mood and Affect: Mood normal.

## 2025-03-14 NOTE — PROGRESS NOTES
Daily Speech Treatment Note    Today's date: 3/14/2025   Patient’s name: Carlos Randolph  : 1940  MRN: [id number]  Safety measures: Dementia  Referring provider: Camden Pandya MD    Encounter Diagnosis     ICD-10-CM    1. Other symbolic dysfunctions  R48.8       2. 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     Objective/Assessment:  -Patient's family member/caregiver was present during today's session.      Short-term goals:  7. Patient will complete word generation tasks (e.g., categorical naming, sentence/phrase completion, word deduction, definitions, etc.) at 75% accuracy with min semantic and phonemic cues from clinician as needed to target the maintenance of his/her expressive vocabulary, as well as promote socialization and safety.    To target expressive language: Patient was presented with object images and was instructed to state what the object was called and its purpose.  He completed this task in 3/10 (30%) opportunities independently, increasing to 6/10 (60%) opportunities given semantic cues, and increasing to 8/10 (80%) opportunities given phonemic cues.  He stated functions in 6/10 (60%) opportunities independently, increasing to 8/10 (80%) opportunities given semantic cues.     8. Patient will complete cognitive-linguistic activities (e.g., verbal and visual problem solving scenarios, sequencing, reasoning, etc.) at 75% accuracy with min semantic cues from clinician as needed to target the maintenance of his/her cognitive-linguistic functioning, as well as promote safety and overall QOL.    To  target matching: Patient was presented with three piles of cards of various colors and numbers and was instructed to sort cards by color or number.  Patient sorted cards in 6/10 (60%) opportunities independently, increasing to 10/10 (100%) opportunities given mod verbal cues.  It did not appear that Patient fully understood task.  He continued to look at cards stacked previously, instead of the most recent card on top.       Plan:  -Continue with current plan of care.

## 2025-03-14 NOTE — ED NOTES
Patient transported to [facility] RN  11/15/22 2105
Provider at bedside       Nathalie Salinas RN  11/15/22 [id number]
Heart sounds: Normal heart sounds. No murmur heard.  Pulmonary:      Effort: Pulmonary effort is normal. No respiratory distress.      Breath sounds: Normal breath sounds. No wheezing or rales.   Abdominal:      General: Bowel sounds are normal. There is no distension.      Palpations: Abdomen is soft. There is no mass.      Tenderness: There is no abdominal tenderness. There is no guarding or rebound.   Musculoskeletal:         General: Normal range of motion.      Cervical back: Normal range of motion and neck supple.   Skin:     General: Skin is warm and dry.      Coloration: Skin is not pale.   Neurological:      Mental Status: He is alert and oriented to person, place, and time.   Psychiatric:         Behavior: Behavior normal.

## 2025-03-21 ENCOUNTER — OFFICE VISIT (OUTPATIENT)
Dept: SPEECH THERAPY | Facility: CLINIC | Age: 85
End: 2025-03-21
Payer: MEDICARE

## 2025-03-21 DIAGNOSIS — R48.8 OTHER SYMBOLIC DYSFUNCTIONS: Primary | ICD-10-CM

## 2025-03-21 DIAGNOSIS — F03.90 UNCOMPLICATED PRESENILE DEMENTIA (HCC): ICD-10-CM

## 2025-03-21 PROCEDURE — 92507 TX SP LANG VOICE COMM INDIV: CPT

## 2025-03-21 NOTE — PROGRESS NOTES
Daily Speech Treatment Note    Today's date: 3/21/2025   Patient’s name: Carlos Randolph  : 1940  MRN: [id number]  Safety measures: Dementia  Referring provider: Camden Pandya MD    Encounter Diagnosis     ICD-10-CM    1. Other symbolic dysfunctions  R48.8       2. 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  Patient was assisted by his daughter in order to fully describe his vacation spot, but was able to follow conversation well once it got started!     Objective/Assessment:  -Patient's family member/caregiver was present during today's session.      Short-term goals:  7. Patient will complete word generation tasks (e.g., categorical naming, sentence/phrase completion, word deduction, definitions, etc.) at 75% accuracy with min semantic and phonemic cues from clinician as needed to target the maintenance of his/her expressive vocabulary, as well as promote socialization and safety.    8. Patient will complete cognitive-linguistic activities (e.g., verbal and visual problem solving scenarios, sequencing, reasoning, etc.) at 75% accuracy with min semantic cues from clinician as needed to target the maintenance of his/her cognitive-linguistic functioning, as well as promote safety and overall QOL.    To target problem solving: Patient was presented with a scenario (e.g. cutting yourself) and was instructed to state items that would be needed to solve the problem (e.g. band aid, alcohol wipe, gauze).  He completed this task in 2/5 (40%) opportunities independently.     To target direction following: Patient was  presented with written directions (e.g. draw a line to connect the tree and the cat) and pictures and was instructed to follow.  He completed this task 4/7 (57%) opportunities independently, increasing to 7/7 (100%) opportunities given mod verbal cues.        Plan:  -Continue with current plan of care.

## 2025-03-28 ENCOUNTER — OFFICE VISIT (OUTPATIENT)
Dept: SPEECH THERAPY | Facility: CLINIC | Age: 85
End: 2025-03-28
Payer: MEDICARE

## 2025-03-28 DIAGNOSIS — R48.8 OTHER SYMBOLIC DYSFUNCTIONS: Primary | ICD-10-CM

## 2025-03-28 DIAGNOSIS — F03.90 UNCOMPLICATED PRESENILE DEMENTIA (HCC): ICD-10-CM

## 2025-03-28 PROCEDURE — 92507 TX SP LANG VOICE COMM INDIV: CPT

## 2025-03-28 NOTE — PROGRESS NOTES
Daily Speech Treatment Note    Today's date: 3/28/2025   Patient’s name: Carlos Randolph  : 1940  MRN: [id number]  Safety measures: Dementia  Referring provider: Camden Pandya MD    Encounter Diagnosis     ICD-10-CM    1. Other symbolic dysfunctions  R48.8       2. 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  Family and Caregivers are wrapping and bracing knee and using topicals (e.g. icy hot) to help relieve pain. Patient was perseverating on this at the start of session.     Objective/Assessment:  -Patient's family member/caregiver was present during today's session.      Short-term goals:  7. Patient will complete word generation tasks (e.g., categorical naming, sentence/phrase completion, word deduction, definitions, etc.) at 75% accuracy with min semantic and phonemic cues from clinician as needed to target the maintenance of his/her expressive vocabulary, as well as promote socialization and safety.    To target receptive and expressive language: Patient was presented with a target card (e.g. salt) and an array of four choices (e.g. dog, burger, pepper, ketchup) and was instructed to choose the most appropriate answer.  He completed this task in 8/10 (80%) opportunities independently, increasing to 10/10 (100%) opportunities given an array of three choices.  He was then instructed to name the pictures.  He completed this task in 11/20 (55%) opportunities independently, increasing to 15/20 (75%) opportunities independently given semantic cues, and increasing to 20/20 (100%) opportunities given phonemic cues.     To target receptive language: Patient was presented with yes/no questions  regarding comparisons (e.g. is a horse larger than a dog?).  He answered questions in 11/15 (73%) opportunities independently, increasing to 15/15 (100%) opportunities given mod verbal cues.      8. Patient will complete cognitive-linguistic activities (e.g., verbal and visual problem solving scenarios, sequencing, reasoning, etc.) at 75% accuracy with min semantic cues from clinician as needed to target the maintenance of his/her cognitive-linguistic functioning, as well as promote safety and overall QOL.      Plan:  -Continue with current plan of care.

## 2025-04-04 ENCOUNTER — HOSPITAL ENCOUNTER (OUTPATIENT)
Dept: RADIOLOGY | Facility: HOSPITAL | Age: 85
Discharge: HOME | End: 2025-04-04
Payer: MEDICARE

## 2025-04-04 ENCOUNTER — OFFICE VISIT (OUTPATIENT)
Dept: SPEECH THERAPY | Facility: CLINIC | Age: 85
End: 2025-04-04
Payer: MEDICARE

## 2025-04-04 DIAGNOSIS — I73.9 PAD (PERIPHERAL ARTERY DISEASE) (HCC): ICD-10-CM

## 2025-04-04 DIAGNOSIS — F03.90 UNCOMPLICATED PRESENILE DEMENTIA (HCC): ICD-10-CM

## 2025-04-04 DIAGNOSIS — R48.8 OTHER SYMBOLIC DYSFUNCTIONS: Primary | ICD-10-CM

## 2025-04-04 PROCEDURE — 93923 UPR/LXTR ART STDY 3+ LVLS: CPT

## 2025-04-04 PROCEDURE — 93922 UPR/L XTREMITY ART 2 LEVELS: CPT | Performed by: SURGERY

## 2025-04-04 PROCEDURE — 93925 LOWER EXTREMITY STUDY: CPT | Performed by: SURGERY

## 2025-04-04 PROCEDURE — 93925 LOWER EXTREMITY STUDY: CPT

## 2025-04-04 PROCEDURE — 92507 TX SP LANG VOICE COMM INDIV: CPT

## 2025-04-04 NOTE — PROGRESS NOTES
Daily Speech Treatment Note    Today's date: 2025   Patient’s name: Carlos Randolph  : 1940  MRN: [id number]  Safety measures: Dementia  Referring provider: Camden Pandya MD    Encounter Diagnosis     ICD-10-CM    1. Other symbolic dysfunctions  R48.8       2. 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  There is a chance his knee pain is resulting from a vascular blockage.  This is something that will be continued to be monitored by vascular.      Objective/Assessment:  -Patient's family member/caregiver was present during today's session.      Short-term goals:  7. Patient will complete word generation tasks (e.g., categorical naming, sentence/phrase completion, word deduction, definitions, etc.) at 75% accuracy with min semantic and phonemic cues from clinician as needed to target the maintenance of his/her expressive vocabulary, as well as promote socialization and safety.    To target expressive language: Patient was shown images of common items and was asked a question about function (e.g. what is this used for?) and Patient was instructed to answer with a verb. He completed this task in 5/10 (50%) opportunities independently, increasing to 8/10 (80%) opportunities given the name of the object, and increasing to 10/10 (100%) opportunities given phonemic cues.     8. Patient will complete cognitive-linguistic activities (e.g., verbal and visual problem solving scenarios, sequencing, reasoning, etc.) at 75% accuracy with min semantic cues from clinician as needed to target the maintenance of his/her cognitive-linguistic functioning, as well as promote safety and  overall QOL.    To target problem solving: Patient was shown pictures where there was an error (e.g. eating soup with a fork) and was instructed to identify the problem and say what the person would need instead.  He identified problems in 6/10 (60%) opportunities independently, increasing to 10/10 (100%) opportunities given visual cues.  He then described what the person would need instead in 3/10 (30%) opportunities independently, increasing to 6/10 (60%) opportunities given semantic cues, and increasing to 8/10 (80%) opportunities given phonemic cues.       Plan:  -Continue with current plan of care.

## 2025-04-11 ENCOUNTER — RESULTS FOLLOW-UP (OUTPATIENT)
Dept: OTHER | Facility: HOSPITAL | Age: 85
End: 2025-04-11

## 2025-04-11 ENCOUNTER — OFFICE VISIT (OUTPATIENT)
Dept: SPEECH THERAPY | Facility: CLINIC | Age: 85
End: 2025-04-11
Payer: MEDICARE

## 2025-04-11 DIAGNOSIS — F03.90 UNCOMPLICATED PRESENILE DEMENTIA (HCC): ICD-10-CM

## 2025-04-11 DIAGNOSIS — R48.8 OTHER SYMBOLIC DYSFUNCTIONS: Primary | ICD-10-CM

## 2025-04-11 PROCEDURE — 92507 TX SP LANG VOICE COMM INDIV: CPT

## 2025-04-11 NOTE — PROGRESS NOTES
Daily Speech Treatment Note    Today's date: 2025   Patient’s name: Carlos Randolph  : 1940  MRN: [id number]  Safety measures: Dementia  Referring provider: Camden Pandya MD    Encounter Diagnosis     ICD-10-CM    1. Other symbolic dysfunctions  R48.8       2. 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      Objective/Assessment:  -Patient's family member/caregiver was present during today's session.      Short-term goals:  7. Patient will complete word generation tasks (e.g., categorical naming, sentence/phrase completion, word deduction, definitions, etc.) at 75% accuracy with min semantic and phonemic cues from clinician as needed to target the maintenance of his/her expressive vocabulary, as well as promote socialization and safety.    To target conversational speech: Patient participated in conversation across various topics.  Patient required mod verbal cues to for word finding on 8+ occasions.     To target receptive and expressive language: Patient was presented with four images (e.g. tape measure, calendar, sun dial, watch) and was instructed to find the one item that does not belong (e.g. tape measure).  Patient completed this task in 3/6 (50%) opportunities independently, increasing to 6/6 (100%) opportunities given semantic cues.  Patient was then instructed to state why that item did not belong (e.g. measures length instead of time).  Patient completed this task in 3/5 (50%) opportunities independently, increasing to 5/6 (83%) opportunities given semantic cues.      8. Patient will complete cognitive-linguistic activities (e.g., verbal and visual problem solving  scenarios, sequencing, reasoning, etc.) at 75% accuracy with min semantic cues from clinician as needed to target the maintenance of his/her cognitive-linguistic functioning, as well as promote safety and overall QOL.      Plan:  -Continue with current plan of care.

## 2025-04-18 ENCOUNTER — OFFICE VISIT (OUTPATIENT)
Dept: SPEECH THERAPY | Facility: CLINIC | Age: 85
End: 2025-04-18
Payer: MEDICARE

## 2025-04-18 DIAGNOSIS — R48.8 OTHER SYMBOLIC DYSFUNCTIONS: Primary | ICD-10-CM

## 2025-04-18 DIAGNOSIS — F03.90 UNCOMPLICATED PRESENILE DEMENTIA (HCC): ICD-10-CM

## 2025-04-18 PROCEDURE — 92507 TX SP LANG VOICE COMM INDIV: CPT

## 2025-04-18 NOTE — PROGRESS NOTES
Daily Speech Treatment Note    Today's date: 2025   Patient’s name: Carlos Randolph  : 1940  MRN: [id number]  Safety measures: Dementia  Referring provider: Camden Pandya MD    Encounter Diagnosis     ICD-10-CM    1. Other symbolic dysfunctions  R48.8       2. 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      Objective/Assessment:  -Patient's family member/caregiver was present during today's session.      Short-term goals:  7. Patient will complete word generation tasks (e.g., categorical naming, sentence/phrase completion, word deduction, definitions, etc.) at 75% accuracy with min semantic and phonemic cues from clinician as needed to target the maintenance of his/her expressive vocabulary, as well as promote socialization and safety.    To target expressive language: Patient was verbally presented with clues and was instructed to state what the target word was.  All words were related to spring.  He completed this task in 2/15 (13%) opportunities independently, increasing to 7/15 (47%) opportunities given semantic cues, and increasing to 11/15 (73%) opportunities given phonemic cues.    8. Patient will complete cognitive-linguistic activities (e.g., verbal and visual problem solving scenarios, sequencing, reasoning, etc.) at 75% accuracy with min semantic cues from clinician as needed to target the maintenance of his/her cognitive-linguistic functioning, as well as promote safety and overall QOL.      Plan:  -Continue with current plan of care.

## 2025-05-02 ENCOUNTER — OFFICE VISIT (OUTPATIENT)
Dept: SPEECH THERAPY | Facility: CLINIC | Age: 85
End: 2025-05-02
Payer: MEDICARE

## 2025-05-02 DIAGNOSIS — F03.90 UNCOMPLICATED PRESENILE DEMENTIA (HCC): ICD-10-CM

## 2025-05-02 DIAGNOSIS — R48.8 OTHER SYMBOLIC DYSFUNCTIONS: Primary | ICD-10-CM

## 2025-05-02 PROCEDURE — 92507 TX SP LANG VOICE COMM INDIV: CPT

## 2025-05-02 NOTE — PROGRESS NOTES
Daily Speech Treatment Note    Today's date: 2025   Patient’s name: Carlos Randolph  : 1940  MRN: [id number]  Safety measures: Dementia  Referring provider: Camden Pandya MD    Encounter Diagnosis     ICD-10-CM    1. Other symbolic dysfunctions  R48.8       2. 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      Objective/Assessment:  -Patient's family member/caregiver was present during today's session.      Short-term goals:  7. Patient will complete word generation tasks (e.g., categorical naming, sentence/phrase completion, word deduction, definitions, etc.) at 75% accuracy with min semantic and phonemic cues from clinician as needed to target the maintenance of his/her expressive vocabulary, as well as promote socialization and safety.    To target expressive language: Patient stated the name and purpose for the items in memory task listed below.  He named items in 8/10 (80%) opportunities independently, increasing to 10/10 (100%) opportunities given semantic cues.  He stated their purpose in 7/10 (70%) opportunities independently, increasing to 10/10 (100%) opportunities given semantic cues.      To target receptive and expressive language skills: Patient was presented with an array of four picture cards and was instructed to find pictures based on the Clinician verbal directive (e.g. Which one is used for yard work).  He was able to identify cards in 8/10 (80%) opportunities independently, increasing to 10/10 (100%) opportunities given min verbal cues.  Patient was then instructed to state the name of each item.  He completed this task in 6/10  (60%) opportunities independently, increasing to 9/10 (90%) opportunities given semantic cues, and increasing to 10/10 (100%) opportunities given phonemic cues.     8. Patient will complete cognitive-linguistic activities (e.g., verbal and visual problem solving scenarios, sequencing, reasoning, etc.) at 75% accuracy with min semantic cues from clinician as needed to target the maintenance of his/her cognitive-linguistic functioning, as well as promote safety and overall QOL.    To target visual memory: Patient played a memory matching game where he had to flip over two cards at a time in order to find pairs.  He completed this task in 6/10 (60%) opportunities independently, increasing to 10/10 (100%) opportunities given mod verbal cues.      Plan:  -Continue with current plan of care.

## 2025-05-09 ENCOUNTER — OFFICE VISIT (OUTPATIENT)
Dept: SPEECH THERAPY | Facility: CLINIC | Age: 85
End: 2025-05-09
Payer: MEDICARE

## 2025-05-09 DIAGNOSIS — R48.8 OTHER SYMBOLIC DYSFUNCTIONS: Primary | ICD-10-CM

## 2025-05-09 DIAGNOSIS — F03.90 UNCOMPLICATED PRESENILE DEMENTIA (HCC): ICD-10-CM

## 2025-05-09 PROCEDURE — 92507 TX SP LANG VOICE COMM INDIV: CPT

## 2025-05-09 NOTE — PROGRESS NOTES
Daily Speech Treatment Note    Today's date: 2025   Patient’s name: Carlos Randolph  : 1940  MRN: [id number]  Safety measures: Dementia  Referring provider: Camden Pandya MD    Encounter Diagnosis     ICD-10-CM    1. Other symbolic dysfunctions  R48.8       2. 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     Objective/Assessment:  -Patient's family member/caregiver was present during today's session.      Short-term goals:  7. Patient will complete word generation tasks (e.g., categorical naming, sentence/phrase completion, word deduction, definitions, etc.) at 75% accuracy with min semantic and phonemic cues from clinician as needed to target the maintenance of his/her expressive vocabulary, as well as promote socialization and safety.    To target naming skills: Patient was verbally presented with a category and was instructed to name two items for each category.  He completed this task over 2 categories in 4/4 (100%) opportunities given max semantic cues.    8. Patient will complete cognitive-linguistic activities (e.g., verbal and visual problem solving scenarios, sequencing, reasoning, etc.) at 75% accuracy with min semantic cues from clinician as needed to target the maintenance of his/her cognitive-linguistic functioning, as well as promote safety and overall QOL.    To target thought organization: Patient created patterns using colorful blocks and a template.  Patient completed this task over 2 trials in 26/32 (81%) opportunities independently, increasing to 32/32 (100%) opportunities given mod verbal and  visual cues.       Plan:  -Patient was provided with home exercises/activities to target goals in plan of care at the end of today's session.  -Continue with current plan of care.

## 2025-05-16 ENCOUNTER — EVALUATION (OUTPATIENT)
Dept: SPEECH THERAPY | Facility: CLINIC | Age: 85
End: 2025-05-16
Payer: MEDICARE

## 2025-05-16 DIAGNOSIS — F03.90 UNCOMPLICATED PRESENILE DEMENTIA (HCC): ICD-10-CM

## 2025-05-16 DIAGNOSIS — R48.8 OTHER SYMBOLIC DYSFUNCTIONS: Primary | ICD-10-CM

## 2025-05-16 PROCEDURE — 96125 COGNITIVE TEST BY HC PRO: CPT

## 2025-05-16 NOTE — PROGRESS NOTES
"Speech-Language Pathology Re-Evaluation    Today's date: 2025   Patient’s name: Carlos Randolph  : 1940  MRN: [id number]  Safety measures: Dementia  Referring provider: Camden Pandya MD    Encounter Diagnosis     ICD-10-CM    1. Other symbolic dysfunctions  R48.8       2. 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  During testing today, Patient demonstrated strengths with confrontational naming of high frequency words and completing mazes.  Patient demonstrated difficulty with temporal and spatial orientation, auditory attention, auditory and visual memory, thought organization, abstraction, and generative naming.  Although Patient demonstrated difficulty with temporal orientation, it should be noted that he used a great strategy during testing.  When asked what his complete date of birth was, he got out his wallet to reference his license!  Over this past certification period, Patient has participated in a variety of memory and word finding tasks.  Patient has made some progress with word finding tasks where he had started with mostly receptive language tasks, but can now complete expressive language tasks (e.g. naming common items).  It is recommended that Patient's therapy continue to focus on word finding as this is what he is most impacted by on a day to day basis.  Although Patient has moments where he is verbose, if he is redirected to the task, he is able to realign his thoughts and have better word finding.  Caregivers have been encouraged to let him \"think out loud\" when doing different language tasks at home.     Patient will continue with skilled maintenance plan. Goals are being adjusted to reflect this change in " treatment focus and to target education/strategies and cognitive training therapy to decrease caregiver burden, promote patient independence, and overall QOL. This patient will require a therapist’s skill to carry out these interventions because of the following condition(s): Dementia (Alzheimer's type).       Short Term Goals (Expires 08/16/2025):  7. Patient will complete word generation tasks (e.g., categorical naming, sentence/phrase completion, word deduction, definitions, etc.) at 75% accuracy with min semantic and phonemic cues from clinician as needed to target the maintenance of his/her expressive vocabulary, as well as promote socialization and safety.    8. Patient will complete cognitive-linguistic activities (e.g., verbal and visual problem solving scenarios, sequencing, reasoning, etc.) at 75% accuracy with min semantic cues from clinician as needed to target the maintenance of his/her cognitive-linguistic functioning, as well as promote safety and overall QOL.    NEW GOALS:   9. Patient will complete word generation tasks (e.g., categorical naming, sentence/phrase completion, word deduction, definitions, etc.) at 50% accuracy with mod semantic and phonemic cues from clinician as needed to target the maintenance of his/her expressive vocabulary, as well as promote socialization and safety.    10. Patient will complete cognitive-linguistic activities (e.g., verbal and visual problem solving scenarios, sequencing, reasoning, etc.) at 50% accuracy with mod semantic cues from clinician as needed to target the maintenance of his/her cognitive-linguistic functioning, as well as promote safety and overall QOL.      Long Term Goals  2. Patient will complete cognitive-linguistic therapy that addresses patient’s specific deficits in processing speed, short-term working memory, attention to detail, monitoring, sequencing, and organization skills, with instruction, to alleviate effects of executive functioning  "disorder deficits by discharge. --PARTIALLY MET    4. Patient will maintain his/her highest level of independence with her current cognitive-linguistic functioning to meet her needs with the implementation of compensatory strategies to promote positive communication interactions and socialization, participation in meaningful activities, safety, and quality of life (to be achieved by discharge).      Plan:  Patient would benefit from outpatient skilled Speech Therapy services: Cognitive-linguistic therapy and Maintenance therapy program    Frequency: 1x weekly  Duration: 3 months    Intervention certification from: 2025  Intervention certification to: 2025      Subjective:  -Patient with nothing new to share.     Patient's goal(s):  \"To get better again.\"     Pain: Absent (scale: N/A)      Objective (testing):  The Cognitive Linguistic Quick Test (CLQT) is designed to measure an individual’s five cognitive domains (attention, memory, executive functions, language, and visuospatial skills). This norm-referenced tool has been standardized on adults ages 18 through 89 years old with neurological impairment as a result of CVA, TBI, or dementia. The following results were obtained during the administration of the assessment.    Cognitive Domain: Score: Range of Severity: IE: Status:   Attention 67/215 Moderate 72 DECLINE   Memory 58/185 Severe 0 IMPROVEMENT   Executive Functions 8/40 Moderate 0 IMPROVEMENT   Language  9/37 Severe 6 IMPROVEMENT   Visuospatial Skills  37/105 Mild 72 DECLINE          *Composite Severity Ratin.8/4.0  Moderate 1.8 NO CHANGE                 Clock Drawin/13 Mild 8 IMPROVEMENT     “IE” indicates the scores from the initial evaluation (2024).    Patient scored below Criterion Cut Scores on the following subtests: Personal Facts, Symbol Cancellation, Confrontational Naming, Clock Drawing, Story Retelling, Symbol Trails, Generative Naming, Design Memory, and Design " Generation.      *Patient named 0 concrete category members (animals) in 60 sec (norm=15+). -- BELOW AVERAGE    *Patient named 1 abstract category members (words beginning with letter 'm') in 60 sec (norm=10+). -- BELOW AVERAGE       Treatment:  -No treatment was performed on this date of service.       Visit Tracking:  POC   Expires Auth Expiration Date ST Visit Limit   03/18/2025 12/31/2025 BOMN   05/28/2025 12/31/2025 BOMN   08/16/2025 12/31/2025 BOMN     Visit/Unit Tracking:  Auth Status Date 03/28/2025 04/04/2025 04/11/2025 04/18/2025 05/02/2025 05/09/2025 05/16/2025   BOMN Used 4 5 6 7 8 9 10    Remaining 6 5 4 3 2 1 0       Intervention comments:  50 minutes test administration; 45 minutes scoring and documentation of POC

## 2025-05-16 NOTE — LETTER
May 16, 2025    Camden Pandya MD  [address] [facility] [address]    Patient: Carlos Randolph   YOB: 1940   Date of Visit: 2025     Encounter Diagnosis     ICD-10-CM    1. Other symbolic dysfunctions  R48.8       2. 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 Camden Pandya MD:    Thank you for your recent referral of Carlos Randolph. Please review the attached evaluation summary from Carlos's recent visit.     Please verify that you agree with the plan of care by signing the attached order.     If you have any questions or concerns, please do not hesitate to call.     I sincerely appreciate the opportunity to share in the care of one of your patients and hope to have another opportunity to work with you in the near future.     Sincerely,    Sheryl Sanchez, SLP      Referring Provider:     Based upon review of the patient's progress and continued therapy plan, it is my medical opinion that Carlos Randolph should continue speech therapy treatment at the Physical Therapy at [address]:                    Camden Pandya MD  [address] [id number]  Via Fax: [phone]        Speech-Language Pathology Re-Evaluation    Today's date: 2025   Patient’s name: Carlos Randolph  : 1940  MRN: [id number]  Safety measures: Dementia  Referring provider: Camden Pandya MD    Encounter Diagnosis     ICD-10-CM    1. Other symbolic dysfunctions  R48.8       2. 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  During testing today, Patient demonstrated strengths with confrontational naming of high frequency words and completing mazes.  Patient demonstrated difficulty with temporal and spatial  "orientation, auditory attention, auditory and visual memory, thought organization, abstraction, and generative naming.  Although Patient demonstrated difficulty with temporal orientation, it should be noted that he used a great strategy during testing.  When asked what his complete date of birth was, he got out his wallet to reference his license!  Over this past certification period, Patient has participated in a variety of memory and word finding tasks.  Patient has made some progress with word finding tasks where he had started with mostly receptive language tasks, but can now complete expressive language tasks (e.g. naming common items).  It is recommended that Patient's therapy continue to focus on word finding as this is what he is most impacted by on a day to day basis.  Although Patient has moments where he is verbose, if he is redirected to the task, he is able to realign his thoughts and have better word finding.  Caregivers have been encouraged to let him \"think out loud\" when doing different language tasks at home.     Patient will continue with skilled maintenance plan. Goals are being adjusted to reflect this change in treatment focus and to target education/strategies and cognitive training therapy to decrease caregiver burden, promote patient independence, and overall QOL. This patient will require a therapist’s skill to carry out these interventions because of the following condition(s): Dementia (Alzheimer's type).       Short Term Goals (Expires 08/16/2025):  7. Patient will complete word generation tasks (e.g., categorical naming, sentence/phrase completion, word deduction, definitions, etc.) at 75% accuracy with min semantic and phonemic cues from clinician as needed to target the maintenance of his/her expressive vocabulary, as well as promote socialization and safety.    8. Patient will complete cognitive-linguistic activities (e.g., verbal and visual problem solving scenarios, sequencing, " "reasoning, etc.) at 75% accuracy with min semantic cues from clinician as needed to target the maintenance of his/her cognitive-linguistic functioning, as well as promote safety and overall QOL.    NEW GOALS:   9. Patient will complete word generation tasks (e.g., categorical naming, sentence/phrase completion, word deduction, definitions, etc.) at 50% accuracy with mod semantic and phonemic cues from clinician as needed to target the maintenance of his/her expressive vocabulary, as well as promote socialization and safety.    10. Patient will complete cognitive-linguistic activities (e.g., verbal and visual problem solving scenarios, sequencing, reasoning, etc.) at 50% accuracy with mod semantic cues from clinician as needed to target the maintenance of his/her cognitive-linguistic functioning, as well as promote safety and overall QOL.      Long Term Goals  2. Patient will complete cognitive-linguistic therapy that addresses patient’s specific deficits in processing speed, short-term working memory, attention to detail, monitoring, sequencing, and organization skills, with instruction, to alleviate effects of executive functioning disorder deficits by discharge. --PARTIALLY MET    4. Patient will maintain his/her highest level of independence with her current cognitive-linguistic functioning to meet her needs with the implementation of compensatory strategies to promote positive communication interactions and socialization, participation in meaningful activities, safety, and quality of life (to be achieved by discharge).      Plan:  Patient would benefit from outpatient skilled Speech Therapy services: Cognitive-linguistic therapy and Maintenance therapy program    Frequency: 1x weekly  Duration: 3 months    Intervention certification from: 5/16/2025  Intervention certification to: 08/16/2025      Subjective:  -Patient with nothing new to share.     Patient's goal(s):  \"To get better again.\"     Pain: Absent " (scale: N/A)      Objective (testing):  The Cognitive Linguistic Quick Test (CLQT) is designed to measure an individual’s five cognitive domains (attention, memory, executive functions, language, and visuospatial skills). This norm-referenced tool has been standardized on adults ages 18 through 89 years old with neurological impairment as a result of CVA, TBI, or dementia. The following results were obtained during the administration of the assessment.    Cognitive Domain: Score: Range of Severity: IE: Status:   Attention 67/215 Moderate 72 DECLINE   Memory 58/185 Severe 0 IMPROVEMENT   Executive Functions 8/40 Moderate 0 IMPROVEMENT   Language   Severe 6 IMPROVEMENT   Visuospatial Skills  37/105 Mild 72 DECLINE          *Composite Severity Ratin.8/4.0  Moderate 1.8 NO CHANGE                 Clock Drawin/13 Mild 8 IMPROVEMENT     “IE” indicates the scores from the initial evaluation (2024).    Patient scored below Criterion Cut Scores on the following subtests: Personal Facts, Symbol Cancellation, Confrontational Naming, Clock Drawing, Story Retelling, Symbol Trails, Generative Naming, Design Memory, and Design Generation.      *Patient named 0 concrete category members (animals) in 60 sec (norm=15+). -- BELOW AVERAGE    *Patient named 1 abstract category members (words beginning with letter 'm') in 60 sec (norm=10+). -- BELOW AVERAGE       Treatment:  -No treatment was performed on this date of service.       Visit Tracking:  POC   Expires Auth Expiration Date ST Visit Limit   2025 BOMN   2025 BOMN   2025 BOMN     Visit/Unit Tracking:  Auth Status Date 2025   BOMN Used 4 5 6 7 8 9 10    Remaining 6 5 4 3 2 1 0       Intervention comments:  50 minutes test administration; 45 minutes scoring and documentation of POC

## 2025-05-23 ENCOUNTER — OFFICE VISIT (OUTPATIENT)
Dept: SPEECH THERAPY | Facility: CLINIC | Age: 85
End: 2025-05-23
Payer: MEDICARE

## 2025-05-23 DIAGNOSIS — R48.8 OTHER SYMBOLIC DYSFUNCTIONS: Primary | ICD-10-CM

## 2025-05-23 DIAGNOSIS — F03.90 UNCOMPLICATED PRESENILE DEMENTIA (HCC): ICD-10-CM

## 2025-05-23 PROCEDURE — 92507 TX SP LANG VOICE COMM INDIV: CPT

## 2025-05-23 NOTE — PROGRESS NOTES
Daily Speech Treatment Note    Today's date: 2025   Patient’s name: Carlos Randolph  : 1940  MRN: [id number]  Safety measures: Dementia  Referring provider: Camden Pandya MD    Encounter Diagnosis     ICD-10-CM    1. Other symbolic dysfunctions  R48.8       2. Uncomplicated presenile dementia (HCC)  F03.90         Visit Tracking:  POC   Expires Auth Expiration Date ST Visit Limit   2025 BOMN   2025 BOMN   2025 BOMN     Visit/Unit Tracking:  Auth Status Date 2025        BOMN Used 1         Remaining 9            Subjective/Behavioral:  -Patient did not have his glasses today.  He had some difficulty seeing the cards he was presented.      Objective/Assessment:  -Patient's family member/caregiver was present during today's session.  -Reviewed testing results and goals in plan care with patient. Patient is in agreement at this time.      Short Term Goals (Expires 2025):  9. Patient will complete word generation tasks (e.g., categorical naming, sentence/phrase completion, word deduction, definitions, etc.) at 50% accuracy with mod semantic and phonemic cues from clinician as needed to target the maintenance of his/her expressive vocabulary, as well as promote socialization and safety.    To target receptive and expressive language: Patient was presented with [employer] Cards (Pictures) where they were instructed to look at the image and answer questions.  Patient completed this task over 5 cards in 27/30 (90%) opportunities independently, increasing to 29/30 (97%) opportunities given semantic and phonemic cues.     To target expressive language: Patient was verbally presented with a category and was instructed to state items for each category.  He completed this task over 2 categories in 4/10 (40%) opportunities independently, increasing to 7/10 (70%) opportunities given semantic cues, and increasing to 10/10 (100%) opportunities given phonemic  cues.     10. Patient will complete cognitive-linguistic activities (e.g., verbal and visual problem solving scenarios, sequencing, reasoning, etc.) at 50% accuracy with mod semantic cues from clinician as needed to target the maintenance of his/her cognitive-linguistic functioning, as well as promote safety and overall QOL.      Plan:  -Patient was provided with home exercises/activities to target goals in plan of care at the end of today's session.  -Continue with current plan of care.

## 2025-05-30 ENCOUNTER — APPOINTMENT (OUTPATIENT)
Dept: SPEECH THERAPY | Facility: CLINIC | Age: 85
End: 2025-05-30
Payer: MEDICARE

## 2025-06-06 ENCOUNTER — OFFICE VISIT (OUTPATIENT)
Dept: SPEECH THERAPY | Facility: CLINIC | Age: 85
End: 2025-06-06
Payer: MEDICARE

## 2025-06-06 ENCOUNTER — HOSPITAL ENCOUNTER (OUTPATIENT)
Dept: RADIOLOGY | Facility: HOSPITAL | Age: 85
Discharge: HOME | End: 2025-06-06
Payer: MEDICARE

## 2025-06-06 ENCOUNTER — RESULTS FOLLOW-UP (OUTPATIENT)
Dept: OTHER | Facility: HOSPITAL | Age: 85
End: 2025-06-06

## 2025-06-06 DIAGNOSIS — I25.118 CORONARY ARTERY DISEASE OF NATIVE ARTERY OF NATIVE HEART WITH STABLE ANGINA PECTORIS (HCC): ICD-10-CM

## 2025-06-06 DIAGNOSIS — F03.90 UNCOMPLICATED PRESENILE DEMENTIA (HCC): ICD-10-CM

## 2025-06-06 DIAGNOSIS — R48.8 OTHER SYMBOLIC DYSFUNCTIONS: Primary | ICD-10-CM

## 2025-06-06 PROCEDURE — 93880 EXTRACRANIAL BILAT STUDY: CPT

## 2025-06-06 PROCEDURE — 92507 TX SP LANG VOICE COMM INDIV: CPT

## 2025-06-06 PROCEDURE — 93880 EXTRACRANIAL BILAT STUDY: CPT | Performed by: SURGERY

## 2025-06-06 NOTE — PROGRESS NOTES
Daily Speech Treatment Note    Today's date: 2025   Patient’s name: Carlos Randolph  : 1940  MRN: [id number]  Safety measures: Dementia  Referring provider: Camden Pandya MD    Encounter Diagnosis     ICD-10-CM    1. Other symbolic dysfunctions  R48.8       2. Uncomplicated presenile dementia (HCC)  F03.90         Visit Tracking:  POC   Expires Auth Expiration Date ST Visit Limit   2025 BOMN   2025 BOMN   2025 BOMN     Visit/Unit Tracking:  Auth Status Date 2025       BOMN Used 1 2        Remaining 9 8           Subjective/Behavioral:  -Patient with no new complaints.     Objective/Assessment:  -Patient's family member/caregiver was present during today's session.  -Reviewed patient's home exercises/activities completed since last appointment. No homework was returned today.       Short Term Goals (Expires 2025):  9. Patient will complete word generation tasks (e.g., categorical naming, sentence/phrase completion, word deduction, definitions, etc.) at 50% accuracy with mod semantic and phonemic cues from clinician as needed to target the maintenance of his/her expressive vocabulary, as well as promote socialization and safety.    To target immediate visual memory: Patient was presented with [employer] Cards ([place]) where they were instructed to study the information for 1 minute.  After the time was up, the card was removed from sight and Patient was instructed to answer questions regarding details from the picture.  They completed this task over 4 cards in 17/32 (53%) opportunities independently, increasing to 32/32 (100%) opportunities given max verbal and visual cues.      10. Patient will complete cognitive-linguistic activities (e.g., verbal and visual problem solving scenarios, sequencing, reasoning, etc.) at 50% accuracy with mod semantic cues from clinician as needed to target the maintenance of his/her cognitive-linguistic  functioning, as well as promote safety and overall QOL.    To target associations: Patient was presented with an item (e.g. coal) and was instructed to state the color most associated with it (e.g. black).  He completed this task in 4/5 (80%) opportunities independently, increasing to 5/5 (100%) opportunities given min verbal cues.       Plan:  -Patient was provided with home exercises/activities to target goals in plan of care at the end of today's session.  -Continue with current plan of care.

## 2025-06-10 NOTE — TELEPHONE ENCOUNTER
S/w patient's daughter Pat on 6/9/25 She states it's difficult to get him out of house due to his dementia & would like to know if appt is really necessary   She is a NP & stated she doesn't see much change in report & would like your input on his follow up Patient was l/s in May 2024

## 2025-06-11 ENCOUNTER — TELEMEDICINE (OUTPATIENT)
Dept: VASCULAR SURGERY | Facility: CLINIC | Age: 85
End: 2025-06-11

## 2025-06-11 DIAGNOSIS — I73.9 PAD (PERIPHERAL ARTERY DISEASE) (HCC): Primary | ICD-10-CM

## 2025-06-11 DIAGNOSIS — I65.23 ASYMPTOMATIC BILATERAL CAROTID ARTERY STENOSIS: ICD-10-CM

## 2025-06-11 PROCEDURE — NA001 NO CHARGE AUDIO ONLY

## 2025-06-11 RX ORDER — CLOPIDOGREL BISULFATE 75 MG/1
75 TABLET ORAL DAILY
COMMUNITY

## 2025-06-11 NOTE — PROGRESS NOTES
Virtual Brief Visit  Name: Carlos Randolph      : 1940      MRN: [id number]  Encounter Provider: JACOBO Godinez  Encounter Date: 2025   [facility]    Spoke with patient's daughter Judith over the phone.  Judith is currently at work and not in the presence of the patient.  Per Judith, patient has progressively worsening dementia and is significantly confused and having difficulty with speech.  We did review results of carotid duplex and LEAD as below.  Judith reports that patient's speech difficulties are related to his dementia and that he has had no focal neurological deficits otherwise.  She reports that patient continues to ambulate without assistive devices and ambulated this past weekend approximately 1/2 block without any difficulty.    Imaging:  Carotid duplex 2025:  50-69% stenosis right ICA, <50% stenosis left ICA     Imaging:  LEAD 2025:  Evidence of >75 % stenosis of the distal common femoral artery.  Patent superficial femoral and popliteal artery stents.  PVR/ PPG tracings are dampened.  Ankle/Brachial index: 0.74 , which is in the moderate disease range (Prior  0.94).  Metatarsal pressure of 96 mmHg.  Great toe pressure of 45 mm Hg, within the healing range.  LEFT LOWER LIMB:  There is diffuse atherosclerotic disease throughout the left lower extremity.  Evidence of 50-75% stenosis of the distal superficial femoral.  Evidence suggests posterior tibial artery occlusion. The anterior tibial artery  is patent.  Ankle/Brachial index: 1.01 (Prior 1.07).  PVR/ PPG tracings are normal.  Metatarsal pressure of 167 mm Hg.  Great toe pressure of 77 mm Hg, within the healing range.    We will plan to repeat LEAD and carotid duplex in approximately 1 year.  Patient is to remain on aspirin, Plavix, and atorvastatin.  Judith was provided information regarding signs and symptoms to notify the vascular surgery office of.

## 2025-06-11 NOTE — PROGRESS NOTES
Virtual Brief Visit  Name: Carlos Randolph      : 1940      MRN: [id number]  Encounter Provider: JACOBO Godinez  Encounter Date: 2025   Encounter department: [facility] [place]  :  Assessment & Plan        History of Present Illness {?Quick Links Encounters * My Last Note * Last Note in Specialty * Snapshot * Since Last Visit * History :10930}  85 y.o. male, former smoker, with PMH CAD s/p CABG, HLD, dementia, asymptomatic bilateral carotid artery stenosis, and PAD s/p right SFA and popliteal artery stenting. Patient is presenting to the vascular surgery office to review results of LEAD completed 2024. Patient is accompanied by his daughter and caregiver, who are assisting with answering questions.     Patient is currently denying any concerning symptoms. He is denying any claudication, rest pain, numbness/tingling, color/temperature change, or tissue loss to his bilateral lower extremities. Patient is ambulatory without the use of assistive devices. Patient is denying any TIA/CVA symptoms (amaurosis fugax, slurred speech, facial droop, dysphagia, unilateral weakness, headache). Per patient's daughter he has not been taking atorvastatin for several months, however he remains on aspirin 81 mg daily. Patient has denies any claudication, rest pain, or tissue. Progressively worsening dementia.     Imaging:  Carotid duplex 2025:  50-69% stenosis right ICA, <50% stenosis left ICA     Imaging:  LEAD 2025:  Evidence of >75 % stenosis of the distal common femoral artery.  Patent superficial femoral and popliteal artery stents.  PVR/ PPG tracings are dampened.  Ankle/Brachial index: 0.74 , which is in the moderate disease range (Prior  0.94).  Metatarsal pressure of 96 mmHg.  Great toe pressure of 45 mm Hg, within the healing range.  LEFT LOWER LIMB:  There is diffuse atherosclerotic disease throughout the left lower extremity.  Evidence of 50-75% stenosis of the distal  superficial femoral.  Evidence suggests posterior tibial artery occlusion. The anterior tibial artery  is patent.  Ankle/Brachial index: 1.01 (Prior 1.07).  PVR/ PPG tracings are normal.  Metatarsal pressure of 167 mm Hg.  Great toe pressure of 77 mm Hg, within the healing range.        Administrative Statements   Encounter provider JACOBO Godinez    The Patient is located at {[facility] Virtual Patient Location:[address]} and in the following state in which I hold an active license {[facility] virtual patient location:[address]}.    The patient was identified by name and date of birth. Carlos Randolph was informed that this is a telemedicine visit and that the visit is being conducted through {[facility] VIRTUAL VISIT MEDIUM:33200}.  {Telemedicine confidentiality :79165} {Telemedicine participants:48764}  He acknowledged consent and understanding of privacy and security of the video platform. The patient has agreed to participate and understands they can discontinue the visit at any time.    I have spent a total time of *** minutes in caring for this patient on the day of the visit/encounter including {[facility] Counseling Topics:[id number]}, not including the time spent for establishing the audio/video connection.

## 2025-06-12 NOTE — ASSESSMENT & PLAN NOTE
History of dementia noted, requires 24-hour caregiving assistance at home  Previously followed by geriatrics outpatient  Plan: failed dysphagia screen, awaiting speech eval, delirium precautions, fall precautions     [de-identified] : She may d/c CAM boot. WBAT in ASO brace. icing/elevation PT/HEP If no improvement MRI R ankle is indicated. F/U 6 WEEKS

## 2025-06-13 ENCOUNTER — OFFICE VISIT (OUTPATIENT)
Dept: SPEECH THERAPY | Facility: CLINIC | Age: 85
End: 2025-06-13
Payer: MEDICARE

## 2025-06-13 DIAGNOSIS — F03.90 UNCOMPLICATED PRESENILE DEMENTIA (HCC): ICD-10-CM

## 2025-06-13 DIAGNOSIS — R48.8 OTHER SYMBOLIC DYSFUNCTIONS: Primary | ICD-10-CM

## 2025-06-13 PROCEDURE — 92507 TX SP LANG VOICE COMM INDIV: CPT

## 2025-06-13 NOTE — PROGRESS NOTES
Daily Speech Treatment Note    Today's date: 2025   Patient’s name: Carlos Randolph  : 1940  MRN: [id number]  Safety measures: Dementia  Referring provider: Camden Pandya MD    Encounter Diagnosis     ICD-10-CM    1. Other symbolic dysfunctions  R48.8       2. 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  He was given this task for homework as well.     Objective/Assessment:  -Patient's family member/caregiver was present during today's session.  -Reviewed patient's home exercises/activities completed since last appointment. No homework was returned today.       Short Term Goals (Expires 2025):  9. Patient will complete word generation tasks (e.g., categorical naming, sentence/phrase completion, word deduction, definitions, etc.) at 50% accuracy with mod semantic and phonemic cues from clinician as needed to target the maintenance of his/her expressive vocabulary, as well as promote socialization and safety.    To target word finding: Patient completed responsive naming task where he was presented with a question (e.g. what do you cut paper with?) and was instructed to answer.  He completed this task in 3/10 (30%) opportunities independently, increasing to 6/10 (60%) opportunities given an example of the object, and increasing to 8/10 (80%) opportunities given phonemic cues.     10. Patient will complete cognitive-linguistic activities (e.g., verbal and visual problem solving scenarios, sequencing, reasoning, etc.) at 50% accuracy with mod semantic cues from clinician as needed to target the maintenance of his/her  cognitive-linguistic functioning, as well as promote safety and overall QOL.    To target thought organization: Patient was verbally presented with a word and was instructed to rearrange the letters within the word to create a new word (I.e. Anagrams). Patient completed this task in 9/10 (90%) opportunities independently, increasing to 10/10 (100%) opportunities given the first letter.       Plan:  -Patient was provided with home exercises/activities to target goals in plan of care at the end of today's session.  -Continue with current plan of care.

## 2025-06-20 ENCOUNTER — OFFICE VISIT (OUTPATIENT)
Dept: SPEECH THERAPY | Facility: CLINIC | Age: 85
End: 2025-06-20
Payer: MEDICARE

## 2025-06-20 DIAGNOSIS — R48.8 OTHER SYMBOLIC DYSFUNCTIONS: Primary | ICD-10-CM

## 2025-06-20 DIAGNOSIS — F03.90 UNCOMPLICATED PRESENILE DEMENTIA (HCC): ICD-10-CM

## 2025-06-20 PROCEDURE — 92507 TX SP LANG VOICE COMM INDIV: CPT

## 2025-06-20 NOTE — PROGRESS NOTES
"Daily Speech Treatment Note    Today's date: 2025   Patient’s name: Carlos Randolph  : 1940  MRN: [id number]  Safety measures: Dementia  Referring provider: Camden Pandya MD    Encounter Diagnosis     ICD-10-CM    1. Other symbolic dysfunctions  R48.8       2. 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  He had quick, \"one liners\" during tasks.     Objective/Assessment:  -Patient's family member/caregiver was present during today's session.  -Reviewed patient's home exercises/activities completed since last appointment. No homework was returned today.       Short Term Goals (Expires 2025):  9. Patient will complete word generation tasks (e.g., categorical naming, sentence/phrase completion, word deduction, definitions, etc.) at 50% accuracy with mod semantic and phonemic cues from clinician as needed to target the maintenance of his/her expressive vocabulary, as well as promote socialization and safety.    To target word generation: Patient was verbally presented with clues (e.g. a white beverage that comes from cows) and was instructed to state the target word (e.g. milk).  He completed this task in 1/5 (20%) opportunities given additional semantic cues, increasing to 3/5 (60%) opportunities given images and phonemic cues.     10. Patient will complete cognitive-linguistic activities (e.g., verbal and visual problem solving scenarios, sequencing, reasoning, etc.) at 50% accuracy with mod semantic cues from clinician as needed to target the maintenance of his/her cognitive-linguistic functioning, as well as promote safety and " overall QOL.    To target thought organization: Patient created patterns using colorful blocks and a template.  Patient completed this task over 3 trials.      Trial One: 5 Errors on pattern  Trial Two: 5 Errors on pattern      Plan:  -Patient was provided with home exercises/activities to target goals in plan of care at the end of today's session.  -Continue with current plan of care.

## 2025-06-27 ENCOUNTER — OFFICE VISIT (OUTPATIENT)
Dept: SPEECH THERAPY | Facility: CLINIC | Age: 85
End: 2025-06-27
Payer: MEDICARE

## 2025-06-27 DIAGNOSIS — F03.90 UNCOMPLICATED PRESENILE DEMENTIA (HCC): ICD-10-CM

## 2025-06-27 DIAGNOSIS — R48.8 OTHER SYMBOLIC DYSFUNCTIONS: Primary | ICD-10-CM

## 2025-06-27 PROCEDURE — 92507 TX SP LANG VOICE COMM INDIV: CPT

## 2025-06-27 NOTE — PROGRESS NOTES
"Daily Speech Treatment Note    Today's date: 2025   Patient’s name: Carlos Randolph  : 1940  MRN: [id number]  Safety measures: Dementia  Referring provider: Camden Pandya MD    Encounter Diagnosis     ICD-10-CM    1. Other symbolic dysfunctions  R48.8       2. 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  He had quick, \"one liners\" during tasks.     Objective/Assessment:  -Patient's family member/caregiver was present during today's session.  -Reviewed patient's home exercises/activities completed since last appointment. No homework was returned today.       Short Term Goals (Expires 2025):  9. Patient will complete word generation tasks (e.g., categorical naming, sentence/phrase completion, word deduction, definitions, etc.) at 50% accuracy with mod semantic and phonemic cues from clinician as needed to target the maintenance of his/her expressive vocabulary, as well as promote socialization and safety.    To target word generation: Patient was verbally presented with three items and was asked to name the category. Patient completed this task with semantic cues, carrier phrases, and verbal choice of two in 3/10 (30%) opportunities.    To target naming: Patient presented with picture cards. Patient named cards independently in 3/9 opportunities. Increased to 8/9 provided semantic, carrier phrase, and phonemic cues.    10. Patient will complete cognitive-linguistic activities (e.g., verbal and visual problem solving scenarios, sequencing, reasoning, etc.) at 50% accuracy with mod semantic cues from clinician as " needed to target the maintenance of his/her cognitive-linguistic functioning, as well as promote safety and overall QOL.        Plan:  -Patient was provided with home exercises/activities to target goals in plan of care at the end of today's session.  -Continue with current plan of care.

## 2025-07-11 ENCOUNTER — OFFICE VISIT (OUTPATIENT)
Dept: SPEECH THERAPY | Facility: CLINIC | Age: 85
End: 2025-07-11
Payer: MEDICARE

## 2025-07-11 DIAGNOSIS — R48.8 OTHER SYMBOLIC DYSFUNCTIONS: Primary | ICD-10-CM

## 2025-07-11 DIAGNOSIS — F03.90 UNCOMPLICATED PRESENILE DEMENTIA (HCC): ICD-10-CM

## 2025-07-11 PROCEDURE — 92507 TX SP LANG VOICE COMM INDIV: CPT

## 2025-07-11 NOTE — PROGRESS NOTES
Daily Speech Treatment Note    Today's date: 2025   Patient’s name: Carlos Randolph  : 1940  MRN: [id number]  Safety measures: Dementia  Referring provider: Camden Pandya MD    Encounter Diagnosis     ICD-10-CM    1. Other symbolic dysfunctions  R48.8       2. 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     Objective/Assessment:  -Patient's family member/caregiver was present during today's session.  -Reviewed patient's home exercises/activities completed since last appointment. No homework was returned today.       Short Term Goals (Expires 2025):  9. Patient will complete word generation tasks (e.g., categorical naming, sentence/phrase completion, word deduction, definitions, etc.) at 50% accuracy with mod semantic and phonemic cues from clinician as needed to target the maintenance of his/her expressive vocabulary, as well as promote socialization and safety.    To target receptive language: Patient was presented with 5 picture scenes representing rooms of a house and was instructed to place items into the appropriate rooms (e.g. toilet in the bathroom).  He completed this task in 2/5 (40%) opportunities independently, increasing to 5/5 (100%) opportunities given max verbal and visual cues.  It should be noted that this task appeared to be very difficult for Patient.  He was getting stuck on the rooms of his house rather than the pictures on the table.  He was trying to explain how his house was set up instead of completing the task at hand.  This task was stopped as it was creating confusion.    To target  expressive language: Patient was presented with common household objects and was instructed to name.  Patient completed this task in 2/10 (20%) opportunities independently, increasing to 3/10 (30%) opportunities given semantic cues, and increasing to 5/10 (50%) opportunities given phonemic cues.    10. Patient will complete cognitive-linguistic activities (e.g., verbal and visual problem solving scenarios, sequencing, reasoning, etc.) at 50% accuracy with mod semantic cues from clinician as needed to target the maintenance of his/her cognitive-linguistic functioning, as well as promote safety and overall QOL.        Plan:  -Patient was provided with home exercises/activities to target goals in plan of care at the end of today's session.  -Continue with current plan of care.

## 2025-07-18 ENCOUNTER — OFFICE VISIT (OUTPATIENT)
Dept: SPEECH THERAPY | Facility: CLINIC | Age: 85
End: 2025-07-18
Payer: MEDICARE

## 2025-07-18 DIAGNOSIS — F03.90 UNCOMPLICATED PRESENILE DEMENTIA (HCC): ICD-10-CM

## 2025-07-18 DIAGNOSIS — R48.8 OTHER SYMBOLIC DYSFUNCTIONS: Primary | ICD-10-CM

## 2025-07-18 PROCEDURE — 92507 TX SP LANG VOICE COMM INDIV: CPT

## 2025-07-18 NOTE — PROGRESS NOTES
Daily Speech Treatment Note    Today's date: 2025   Patient’s name: Carlos Randolph  : 1940  MRN: [id number]  Safety measures: Dementia  Referring provider: Camden Pandya MD    Encounter Diagnosis     ICD-10-CM    1. Other symbolic dysfunctions  R48.8       2. 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     Objective/Assessment:  -Patient's family member/caregiver was present during today's session.  -Reviewed patient's home exercises/activities completed since last appointment. No homework was returned today.       Short Term Goals (Expires 2025):  9. Patient will complete word generation tasks (e.g., categorical naming, sentence/phrase completion, word deduction, definitions, etc.) at 50% accuracy with mod semantic and phonemic cues from clinician as needed to target the maintenance of his/her expressive vocabulary, as well as promote socialization and safety.    To target receptive and expressive language: Patient was presented with an array of colors and was given an item that is typical of that color (e.g. blue karlie, green grass) and was instructed to sort the picture into the appropriate color pile.  He completed this task in 9/10 (90%) opportunities independently, increasing to 10/10 (100%) opportunities given min verbal cues.  Patient was then instructed to name the item.  He completed this task in 3/10 (30%) opportunities independently, increasing to 5/10 (50%) opportunities given semantic cues, and increasing to 7/10 (70%) opportunities given phonemic cues.    10. Patient will complete cognitive-linguistic activities (e.g., verbal and visual  "problem solving scenarios, sequencing, reasoning, etc.) at 50% accuracy with mod semantic cues from clinician as needed to target the maintenance of his/her cognitive-linguistic functioning, as well as promote safety and overall QOL.    To target visual skills: Patient was presented with \"Spot It\" where he was instructed to find similarities between pictures.  He completed this task over 3 rounds needing max verbal and visual cues.      Plan:  -Patient was provided with home exercises/activities to target goals in plan of care at the end of today's session.  -Continue with current plan of care.  "

## 2025-07-25 ENCOUNTER — APPOINTMENT (OUTPATIENT)
Dept: SPEECH THERAPY | Facility: CLINIC | Age: 85
End: 2025-07-25
Payer: MEDICARE

## 2025-07-26 ENCOUNTER — HOSPITAL ENCOUNTER (EMERGENCY)
Facility: HOSPITAL | Age: 85
Discharge: HOME | End: 2025-07-26
Attending: EMERGENCY MEDICINE
Payer: MEDICARE

## 2025-07-26 ENCOUNTER — APPOINTMENT (EMERGENCY)
Dept: VASCULAR ULTRASOUND | Facility: HOSPITAL | Age: 85
End: 2025-07-26
Payer: MEDICARE

## 2025-07-26 ENCOUNTER — APPOINTMENT (EMERGENCY)
Dept: RADIOLOGY | Facility: HOSPITAL | Age: 85
End: 2025-07-26
Payer: MEDICARE

## 2025-07-26 VITALS
WEIGHT: 162.8 LBS | SYSTOLIC BLOOD PRESSURE: 104 MMHG | HEART RATE: 81 BPM | OXYGEN SATURATION: 95 % | BODY MASS INDEX: 31.79 KG/M2 | RESPIRATION RATE: 18 BRPM | TEMPERATURE: 98.7 F | DIASTOLIC BLOOD PRESSURE: 51 MMHG

## 2025-07-26 DIAGNOSIS — M79.606 PAIN OF LOWER EXTREMITY, UNSPECIFIED LATERALITY: Primary | ICD-10-CM

## 2025-07-26 LAB
ALBUMIN SERPL BCG-MCNC: 3.6 G/DL (ref 3.5–5)
ALP SERPL-CCNC: 77 U/L (ref 34–104)
ALT SERPL W P-5'-P-CCNC: 21 U/L (ref 7–52)
ANION GAP SERPL CALCULATED.3IONS-SCNC: 7 MMOL/L (ref 4–13)
APTT PPP: 29 SECONDS (ref 23–34)
AST SERPL W P-5'-P-CCNC: 19 U/L (ref 13–39)
BASOPHILS # BLD AUTO: 0.02 THOUSANDS/ÂΜL (ref 0–0.1)
BASOPHILS NFR BLD AUTO: 0 % (ref 0–1)
BILIRUB SERPL-MCNC: 0.42 MG/DL (ref 0.2–1)
BUN SERPL-MCNC: 17 MG/DL (ref 5–25)
CALCIUM SERPL-MCNC: 8.6 MG/DL (ref 8.4–10.2)
CHLORIDE SERPL-SCNC: 108 MMOL/L (ref 96–108)
CO2 SERPL-SCNC: 26 MMOL/L (ref 21–32)
CREAT SERPL-MCNC: 0.73 MG/DL (ref 0.6–1.3)
EOSINOPHIL # BLD AUTO: 0.21 THOUSAND/ÂΜL (ref 0–0.61)
EOSINOPHIL NFR BLD AUTO: 5 % (ref 0–6)
ERYTHROCYTE [DISTWIDTH] IN BLOOD BY AUTOMATED COUNT: 16.6 % (ref 11.6–15.1)
GFR SERPL CREATININE-BSD FRML MDRD: 85 ML/MIN/1.73SQ M
GLUCOSE SERPL-MCNC: 111 MG/DL (ref 65–140)
HCT VFR BLD AUTO: 33.1 % (ref 36.5–49.3)
HGB BLD-MCNC: 10.4 G/DL (ref 12–17)
IMM GRANULOCYTES # BLD AUTO: 0 THOUSAND/UL (ref 0–0.2)
IMM GRANULOCYTES NFR BLD AUTO: 0 % (ref 0–2)
INR PPP: 1.05 (ref 0.85–1.19)
LYMPHOCYTES # BLD AUTO: 1.5 THOUSANDS/ÂΜL (ref 0.6–4.47)
LYMPHOCYTES NFR BLD AUTO: 33 % (ref 14–44)
MCH RBC QN AUTO: 25.9 PG (ref 26.8–34.3)
MCHC RBC AUTO-ENTMCNC: 31.4 G/DL (ref 31.4–37.4)
MCV RBC AUTO: 83 FL (ref 82–98)
MONOCYTES # BLD AUTO: 0.57 THOUSAND/ÂΜL (ref 0.17–1.22)
MONOCYTES NFR BLD AUTO: 13 % (ref 4–12)
NEUTROPHILS # BLD AUTO: 2.2 THOUSANDS/ÂΜL (ref 1.85–7.62)
NEUTS SEG NFR BLD AUTO: 49 % (ref 43–75)
NRBC BLD AUTO-RTO: 0 /100 WBCS
PLATELET # BLD AUTO: 209 THOUSANDS/UL (ref 149–390)
PMV BLD AUTO: 9.8 FL (ref 8.9–12.7)
POTASSIUM SERPL-SCNC: 4 MMOL/L (ref 3.5–5.3)
PROT SERPL-MCNC: 6.2 G/DL (ref 6.4–8.4)
PROTHROMBIN TIME: 14.1 SECONDS (ref 12.3–15)
RBC # BLD AUTO: 4.01 MILLION/UL (ref 3.88–5.62)
SODIUM SERPL-SCNC: 141 MMOL/L (ref 135–147)
WBC # BLD AUTO: 4.5 THOUSAND/UL (ref 4.31–10.16)

## 2025-07-26 PROCEDURE — 36415 COLL VENOUS BLD VENIPUNCTURE: CPT | Performed by: PHYSICIAN ASSISTANT

## 2025-07-26 PROCEDURE — 80053 COMPREHEN METABOLIC PANEL: CPT | Performed by: PHYSICIAN ASSISTANT

## 2025-07-26 PROCEDURE — 85025 COMPLETE CBC W/AUTO DIFF WBC: CPT | Performed by: PHYSICIAN ASSISTANT

## 2025-07-26 PROCEDURE — 99284 EMERGENCY DEPT VISIT MOD MDM: CPT

## 2025-07-26 PROCEDURE — 73630 X-RAY EXAM OF FOOT: CPT

## 2025-07-26 PROCEDURE — 71046 X-RAY EXAM CHEST 2 VIEWS: CPT

## 2025-07-26 PROCEDURE — 93970 EXTREMITY STUDY: CPT

## 2025-07-26 PROCEDURE — 99285 EMERGENCY DEPT VISIT HI MDM: CPT | Performed by: PHYSICIAN ASSISTANT

## 2025-07-26 PROCEDURE — 85610 PROTHROMBIN TIME: CPT | Performed by: PHYSICIAN ASSISTANT

## 2025-07-26 PROCEDURE — 85730 THROMBOPLASTIN TIME PARTIAL: CPT | Performed by: PHYSICIAN ASSISTANT

## 2025-07-26 PROCEDURE — 93970 EXTREMITY STUDY: CPT | Performed by: SURGERY

## 2025-08-01 ENCOUNTER — OFFICE VISIT (OUTPATIENT)
Dept: SPEECH THERAPY | Facility: CLINIC | Age: 85
End: 2025-08-01
Payer: MEDICARE

## 2025-08-01 DIAGNOSIS — R48.8 OTHER SYMBOLIC DYSFUNCTIONS: Primary | ICD-10-CM

## 2025-08-01 DIAGNOSIS — F03.90 UNCOMPLICATED PRESENILE DEMENTIA (HCC): ICD-10-CM

## 2025-08-01 PROCEDURE — 92507 TX SP LANG VOICE COMM INDIV: CPT

## 2025-08-08 ENCOUNTER — OFFICE VISIT (OUTPATIENT)
Dept: SPEECH THERAPY | Facility: CLINIC | Age: 85
End: 2025-08-08
Payer: MEDICARE

## 2025-08-08 DIAGNOSIS — R48.8 OTHER SYMBOLIC DYSFUNCTIONS: Primary | ICD-10-CM

## 2025-08-08 DIAGNOSIS — F03.90 UNCOMPLICATED PRESENILE DEMENTIA (HCC): ICD-10-CM

## 2025-08-08 PROCEDURE — 92507 TX SP LANG VOICE COMM INDIV: CPT

## 2025-08-22 ENCOUNTER — EVALUATION (OUTPATIENT)
Dept: SPEECH THERAPY | Facility: CLINIC | Age: 85
End: 2025-08-22
Payer: MEDICARE

## 2025-08-22 DIAGNOSIS — F03.90 UNCOMPLICATED PRESENILE DEMENTIA (HCC): ICD-10-CM

## 2025-08-22 DIAGNOSIS — R48.8 OTHER SYMBOLIC DYSFUNCTIONS: Primary | ICD-10-CM

## 2025-08-22 PROCEDURE — 92523 SPEECH SOUND LANG COMPREHEN: CPT

## (undated) DEVICE — CATH DIAG 5FR .035 70CM PIG CSC 20

## (undated) DEVICE — FLEXCIL HIGH PRESSURE CONTRAST INJECTION LINE: Brand: NAMIC

## (undated) DEVICE — STERILE ICS CARDIOVASCULAR PK: Brand: CARDINAL HEALTH

## (undated) DEVICE — SGW STORQ .035 300CM ANGLE STD: Brand: STORQ

## (undated) DEVICE — GAUZE SPONGES,USP TYPE VII GAUZE, 12 PLY: Brand: CURITY

## (undated) DEVICE — MICROPUNCTURE 501

## (undated) DEVICE — Device

## (undated) DEVICE — PRESTO™ INFLATION DEVICE: Brand: PRESTO

## (undated) DEVICE — PINNACLE R/O II INTRODUCER SHEATH WITH RADIOPAQUE MARKER: Brand: PINNACLE

## (undated) DEVICE — FLUID MANAGEMENT KIT - IR

## (undated) DEVICE — DESTINATION PERIPHERAL GUIDING SHEATH: Brand: DESTINATION

## (undated) DEVICE — RADIFOCUS TORQUE DEVICE MULTI-TORQUE VISE: Brand: RADIFOCUS TORQUE DEVICE

## (undated) DEVICE — CATH BAL CHARGER 6 X 100MM X 135CM

## (undated) DEVICE — NON-DEHP HIGH FLOW RATE EXTENSION SET, MALE LUER LOCK ADAPTER

## (undated) DEVICE — NAVICROSS SUPPORT CATHETER: Brand: NAVICROSS

## (undated) DEVICE — SNAP KOVER: Brand: UNBRANDED

## (undated) DEVICE — ADHESIVE SKIN HIGH VISCOSITY EXOFIN 1ML

## (undated) DEVICE — IV SET 15 DROP STERILE 0/Y GRAVITY

## (undated) DEVICE — 3M™ TEGADERM™ TRANSPARENT FILM DRESSING FRAME STYLE, 1626W, 4 IN X 4-3/4 IN (10 CM X 12 CM), 50/CT 4CT/CASE: Brand: 3M™ TEGADERM™

## (undated) DEVICE — INFUSER BAG 500ML

## (undated) DEVICE — RADIFOCUS GLIDEWIRE ADVANTAGE GUIDEWIRE: Brand: GLIDEWIRE ADVANTAGE

## (undated) DEVICE — CATH DIAG 5FR .035 65CM 6S OMMI-FLUSH

## (undated) DEVICE — COVER PROBE INTRAOPERATIVE 6 X 96 IN

## (undated) DEVICE — SYRINGE KIT,PACKAGED,,150FT,MK 7(ANGIO-ARTERION, 150ML SYR KIT W/QFT,MC)(60729385): Brand: MEDRAD® MARK 7 ARTERION DISPOSABLE SYRINGE 150 ML WITH QUICK FILL TUBE